# Patient Record
Sex: FEMALE | Race: OTHER | Employment: UNEMPLOYED | URBAN - METROPOLITAN AREA
[De-identification: names, ages, dates, MRNs, and addresses within clinical notes are randomized per-mention and may not be internally consistent; named-entity substitution may affect disease eponyms.]

---

## 2017-01-28 ENCOUNTER — APPOINTMENT (OUTPATIENT)
Dept: CT IMAGING | Facility: HOSPITAL | Age: 57
End: 2017-01-28
Attending: EMERGENCY MEDICINE
Payer: COMMERCIAL

## 2017-01-28 ENCOUNTER — APPOINTMENT (OUTPATIENT)
Dept: GENERAL RADIOLOGY | Facility: HOSPITAL | Age: 57
End: 2017-01-28
Attending: EMERGENCY MEDICINE
Payer: COMMERCIAL

## 2017-01-28 ENCOUNTER — HOSPITAL ENCOUNTER (EMERGENCY)
Facility: HOSPITAL | Age: 57
Discharge: HOME | End: 2017-01-28
Attending: EMERGENCY MEDICINE
Payer: COMMERCIAL

## 2017-01-28 VITALS
RESPIRATION RATE: 17 BRPM | WEIGHT: 118 LBS | OXYGEN SATURATION: 98 % | TEMPERATURE: 98 F | DIASTOLIC BLOOD PRESSURE: 80 MMHG | BODY MASS INDEX: 22.28 KG/M2 | HEIGHT: 61 IN | SYSTOLIC BLOOD PRESSURE: 139 MMHG | HEART RATE: 71 BPM

## 2017-01-28 DIAGNOSIS — G43.009 ATYPICAL MIGRAINE: Primary | ICD-10-CM

## 2017-01-28 LAB
ALBUMIN SERPL-MCNC: 3.7 G/DL (ref 3.5–4.8)
ALP LIVER SERPL-CCNC: 109 U/L (ref 41–108)
ALT SERPL-CCNC: 31 U/L (ref 14–54)
APTT PPP: 28.5 SECONDS (ref 25–34)
AST SERPL-CCNC: 23 U/L (ref 15–41)
BASOPHILS # BLD AUTO: 0.04 X10(3) UL (ref 0–0.1)
BASOPHILS NFR BLD AUTO: 0.5 %
BILIRUB SERPL-MCNC: 0.4 MG/DL (ref 0.1–2)
BUN BLD-MCNC: 18 MG/DL (ref 8–20)
CALCIUM BLD-MCNC: 8.1 MG/DL (ref 8.3–10.3)
CHLORIDE: 105 MMOL/L (ref 101–111)
CO2: 30 MMOL/L (ref 22–32)
CREAT BLD-MCNC: 0.71 MG/DL (ref 0.55–1.02)
D-DIMER: <0.27 UG/ML FEU (ref 0–0.49)
EOSINOPHIL # BLD AUTO: 0.16 X10(3) UL (ref 0–0.3)
EOSINOPHIL NFR BLD AUTO: 1.9 %
ERYTHROCYTE [DISTWIDTH] IN BLOOD BY AUTOMATED COUNT: 13.2 % (ref 11.5–16)
GLUCOSE BLD-MCNC: 103 MG/DL (ref 70–99)
HCT VFR BLD AUTO: 41.7 % (ref 34–50)
HGB BLD-MCNC: 13.7 G/DL (ref 12–16)
IMMATURE GRANULOCYTE COUNT: 0.02 X10(3) UL (ref 0–1)
IMMATURE GRANULOCYTE RATIO %: 0.2 %
INR BLD: 0.93 (ref 0.89–1.12)
LYMPHOCYTES # BLD AUTO: 2.46 X10(3) UL (ref 0.9–4)
LYMPHOCYTES NFR BLD AUTO: 29.3 %
M PROTEIN MFR SERPL ELPH: 7.5 G/DL (ref 6.1–8.3)
MCH RBC QN AUTO: 29.3 PG (ref 27–33.2)
MCHC RBC AUTO-ENTMCNC: 32.9 G/DL (ref 31–37)
MCV RBC AUTO: 89.3 FL (ref 81–100)
MONOCYTES # BLD AUTO: 0.49 X10(3) UL (ref 0.1–0.6)
MONOCYTES NFR BLD AUTO: 5.8 %
NEUTROPHIL ABS PRELIM: 5.24 X10 (3) UL (ref 1.3–6.7)
NEUTROPHILS # BLD AUTO: 5.24 X10(3) UL (ref 1.3–6.7)
NEUTROPHILS NFR BLD AUTO: 62.3 %
PLATELET # BLD AUTO: 241 10(3)UL (ref 150–450)
POTASSIUM SERPL-SCNC: 3.6 MMOL/L (ref 3.6–5.1)
PSA SERPL DL<=0.01 NG/ML-MCNC: 12.7 SECONDS (ref 12.3–14.8)
RBC # BLD AUTO: 4.67 X10(6)UL (ref 3.8–5.1)
RED CELL DISTRIBUTION WIDTH-SD: 42.7 FL (ref 35.1–46.3)
SED RATE-ML: 8 MM/HR (ref 0–25)
SODIUM SERPL-SCNC: 142 MMOL/L (ref 136–144)
TROPONIN: <0.046 NG/ML (ref ?–0.05)
WBC # BLD AUTO: 8.4 X10(3) UL (ref 4–13)

## 2017-01-28 PROCEDURE — 80053 COMPREHEN METABOLIC PANEL: CPT | Performed by: EMERGENCY MEDICINE

## 2017-01-28 PROCEDURE — 99284 EMERGENCY DEPT VISIT MOD MDM: CPT

## 2017-01-28 PROCEDURE — 71010 XR CHEST AP PORTABLE  (CPT=71010): CPT

## 2017-01-28 PROCEDURE — 96374 THER/PROPH/DIAG INJ IV PUSH: CPT

## 2017-01-28 PROCEDURE — 85730 THROMBOPLASTIN TIME PARTIAL: CPT | Performed by: EMERGENCY MEDICINE

## 2017-01-28 PROCEDURE — 85378 FIBRIN DEGRADE SEMIQUANT: CPT | Performed by: EMERGENCY MEDICINE

## 2017-01-28 PROCEDURE — 93010 ELECTROCARDIOGRAM REPORT: CPT

## 2017-01-28 PROCEDURE — 96375 TX/PRO/DX INJ NEW DRUG ADDON: CPT

## 2017-01-28 PROCEDURE — 84484 ASSAY OF TROPONIN QUANT: CPT | Performed by: EMERGENCY MEDICINE

## 2017-01-28 PROCEDURE — 85025 COMPLETE CBC W/AUTO DIFF WBC: CPT | Performed by: EMERGENCY MEDICINE

## 2017-01-28 PROCEDURE — 85610 PROTHROMBIN TIME: CPT | Performed by: EMERGENCY MEDICINE

## 2017-01-28 PROCEDURE — 85652 RBC SED RATE AUTOMATED: CPT | Performed by: EMERGENCY MEDICINE

## 2017-01-28 PROCEDURE — 93005 ELECTROCARDIOGRAM TRACING: CPT

## 2017-01-28 PROCEDURE — 70498 CT ANGIOGRAPHY NECK: CPT

## 2017-01-28 PROCEDURE — 70496 CT ANGIOGRAPHY HEAD: CPT

## 2017-01-28 PROCEDURE — 99285 EMERGENCY DEPT VISIT HI MDM: CPT

## 2017-01-28 RX ORDER — KETOROLAC TROMETHAMINE 30 MG/ML
30 INJECTION, SOLUTION INTRAMUSCULAR; INTRAVENOUS ONCE
Status: COMPLETED | OUTPATIENT
Start: 2017-01-28 | End: 2017-01-28

## 2017-01-28 RX ORDER — ONDANSETRON 2 MG/ML
4 INJECTION INTRAMUSCULAR; INTRAVENOUS ONCE
Status: COMPLETED | OUTPATIENT
Start: 2017-01-28 | End: 2017-01-28

## 2017-01-28 RX ORDER — HYDROMORPHONE HYDROCHLORIDE 1 MG/ML
0.5 INJECTION, SOLUTION INTRAMUSCULAR; INTRAVENOUS; SUBCUTANEOUS EVERY 30 MIN PRN
Status: DISCONTINUED | OUTPATIENT
Start: 2017-01-28 | End: 2017-01-28

## 2017-01-28 RX ORDER — BUTALBITAL, ACETAMINOPHEN AND CAFFEINE 50; 325; 40 MG/1; MG/1; MG/1
1-2 TABLET ORAL EVERY 4 HOURS PRN
Qty: 20 TABLET | Refills: 0 | Status: SHIPPED | OUTPATIENT
Start: 2017-01-28 | End: 2017-02-04

## 2017-01-28 NOTE — ED INITIAL ASSESSMENT (HPI)
C/o pain to lt shoulder and arm onset yesterday, worse today,   Now also has numbness to lt side of face, tongue and lt arm

## 2017-01-28 NOTE — ED PROVIDER NOTES
Patient Seen in: [facility] Emergency Department    History   Patient presents with:  Back Pain (musculoskeletal)  Numbness Weakness (neurologic)    Stated Complaint: back pain/numbness to face    HPI    Patient presents with left neck and shoulder Vaginal Cream,  APPLY AS DIRECTED DAILY VAGINALLY FOR 30 DAYS   Nortriptyline HCl 10 MG Oral Cap,  For 1 week, take one capsule nightly, afterwards increase to 2 capsule nightly   Dicyclomine HCl 10 MG Oral Cap,  Take 10 mg by mouth 3 (three) times daily. Temp(Src) 97.6 °F (36.4 °C) (Temporal)  Resp 17  Ht 154.9 cm (5' 1\")  Wt 53.524 kg  BMI 22.31 kg/m2  SpO2 98%        Physical Exam    General: Alert and oriented x3 in no acute distress.   HEENT: Normocephalic, atraumatic, pupils equal round and reactive t DIFFERENTIAL[[id number]]                              Final result                 Please view results for these tests on the individual orders.    RAINBOW DRAW BLUE   RAINBOW DRAW GOLD   RAINBOW DRAW LAVENDER   RAINBOW DRAW LIGHT GREEN   CBC W/ DIFFERENTIAL arteries. All measurements obtained in this exam were performed using NASCET criteria. Dose reduction techniques were used.  Dose information is transmitted to the ACR ([facility]) Amilcar Harrell [address] ([address]) which includes t subclavian arteries. The origins of the vertebral arteries are patent. The cervical vertebral arteries are widely patent. The left vertebral artery is dominant. Degenerative changes in the spine. Scattered atelectasis in the lung apices.        1/28/201 days        Medications Prescribed:  Discharge Medication List as of 1/28/2017  7:22 PM    START taking these medications    Butalbital-APAP-Caffeine -40 MG Oral Tab  Take 1-2 tablets by mouth every 4 (four) hours as needed for Pain., Script printed,

## 2017-01-29 LAB
ATRIAL RATE: 73 BPM
P AXIS: 51 DEGREES
P-R INTERVAL: 156 MS
Q-T INTERVAL: 426 MS
QRS DURATION: 80 MS
QTC CALCULATION (BEZET): 469 MS
R AXIS: 55 DEGREES
T AXIS: 45 DEGREES
VENTRICULAR RATE: 73 BPM

## 2017-02-04 ENCOUNTER — LAB ENCOUNTER (OUTPATIENT)
Dept: LAB | Age: 57
End: 2017-02-04
Attending: OBSTETRICS & GYNECOLOGY
Payer: COMMERCIAL

## 2017-02-04 DIAGNOSIS — N76.0 ACUTE VAGINITIS: ICD-10-CM

## 2017-02-04 DIAGNOSIS — Z01.419 PAP SMEAR, AS PART OF ROUTINE GYNECOLOGICAL EXAMINATION: ICD-10-CM

## 2017-02-04 DIAGNOSIS — Z12.31 VISIT FOR SCREENING MAMMOGRAM: Primary | ICD-10-CM

## 2017-02-04 PROCEDURE — 88175 CYTOPATH C/V AUTO FLUID REDO: CPT

## 2017-02-04 PROCEDURE — 87624 HPV HI-RISK TYP POOLED RSLT: CPT

## 2017-02-04 PROCEDURE — 87510 GARDNER VAG DNA DIR PROBE: CPT

## 2017-02-04 PROCEDURE — 87480 CANDIDA DNA DIR PROBE: CPT

## 2017-02-04 PROCEDURE — 87660 TRICHOMONAS VAGIN DIR PROBE: CPT

## 2017-02-08 LAB — HPV I/H RISK 1 DNA SPEC QL NAA+PROBE: NEGATIVE

## 2017-02-11 ENCOUNTER — HOSPITAL ENCOUNTER (EMERGENCY)
Facility: HOSPITAL | Age: 57
Discharge: HOME | End: 2017-02-11
Attending: STUDENT IN AN ORGANIZED HEALTH CARE EDUCATION/TRAINING PROGRAM
Payer: COMMERCIAL

## 2017-02-11 ENCOUNTER — APPOINTMENT (OUTPATIENT)
Dept: GENERAL RADIOLOGY | Facility: HOSPITAL | Age: 57
End: 2017-02-11
Attending: STUDENT IN AN ORGANIZED HEALTH CARE EDUCATION/TRAINING PROGRAM
Payer: COMMERCIAL

## 2017-02-11 VITALS
WEIGHT: 121 LBS | HEART RATE: 60 BPM | RESPIRATION RATE: 14 BRPM | SYSTOLIC BLOOD PRESSURE: 125 MMHG | DIASTOLIC BLOOD PRESSURE: 90 MMHG | HEIGHT: 61 IN | BODY MASS INDEX: 22.84 KG/M2 | OXYGEN SATURATION: 96 %

## 2017-02-11 DIAGNOSIS — R07.9 ACUTE CHEST PAIN: ICD-10-CM

## 2017-02-11 DIAGNOSIS — R10.13 ABDOMINAL PAIN, EPIGASTRIC: Primary | ICD-10-CM

## 2017-02-11 DIAGNOSIS — K21.9 GASTROESOPHAGEAL REFLUX DISEASE, ESOPHAGITIS PRESENCE NOT SPECIFIED: ICD-10-CM

## 2017-02-11 LAB
ALBUMIN SERPL-MCNC: 3.5 G/DL (ref 3.5–4.8)
ALP LIVER SERPL-CCNC: 121 U/L (ref 41–108)
ALT SERPL-CCNC: 47 U/L (ref 14–54)
AST SERPL-CCNC: 70 U/L (ref 15–41)
ATRIAL RATE: 73 BPM
BASOPHILS # BLD AUTO: 0.07 X10(3) UL (ref 0–0.1)
BASOPHILS NFR BLD AUTO: 0.7 %
BILIRUB SERPL-MCNC: 0.4 MG/DL (ref 0.1–2)
BILIRUB UR QL STRIP.AUTO: NEGATIVE
BUN BLD-MCNC: 17 MG/DL (ref 8–20)
CALCIUM BLD-MCNC: 8.9 MG/DL (ref 8.3–10.3)
CHLORIDE: 101 MMOL/L (ref 101–111)
CO2: 32 MMOL/L (ref 22–32)
COLOR UR AUTO: YELLOW
CREAT BLD-MCNC: 0.68 MG/DL (ref 0.55–1.02)
EOSINOPHIL # BLD AUTO: 0.19 X10(3) UL (ref 0–0.3)
EOSINOPHIL NFR BLD AUTO: 1.8 %
ERYTHROCYTE [DISTWIDTH] IN BLOOD BY AUTOMATED COUNT: 13.2 % (ref 11.5–16)
GLUCOSE BLD-MCNC: 109 MG/DL (ref 70–99)
GLUCOSE UR STRIP.AUTO-MCNC: NEGATIVE MG/DL
HCT VFR BLD AUTO: 42.9 % (ref 34–50)
HGB BLD-MCNC: 14.5 G/DL (ref 12–16)
IMMATURE GRANULOCYTE COUNT: 0.04 X10(3) UL (ref 0–1)
IMMATURE GRANULOCYTE RATIO %: 0.4 %
KETONES UR STRIP.AUTO-MCNC: NEGATIVE MG/DL
LIPASE: 427 U/L (ref 73–393)
LYMPHOCYTES # BLD AUTO: 4.93 X10(3) UL (ref 0.9–4)
LYMPHOCYTES NFR BLD AUTO: 46.3 %
M PROTEIN MFR SERPL ELPH: 7.5 G/DL (ref 6.1–8.3)
MCH RBC QN AUTO: 29.2 PG (ref 27–33.2)
MCHC RBC AUTO-ENTMCNC: 33.8 G/DL (ref 31–37)
MCV RBC AUTO: 86.5 FL (ref 81–100)
MONOCYTES # BLD AUTO: 0.64 X10(3) UL (ref 0.1–0.6)
MONOCYTES NFR BLD AUTO: 6 %
NEUTROPHIL ABS PRELIM: 4.78 X10 (3) UL (ref 1.3–6.7)
NEUTROPHILS # BLD AUTO: 4.78 X10(3) UL (ref 1.3–6.7)
NEUTROPHILS NFR BLD AUTO: 44.8 %
NITRITE UR QL STRIP.AUTO: NEGATIVE
P AXIS: 77 DEGREES
P-R INTERVAL: 160 MS
PH UR STRIP.AUTO: 8 [PH] (ref 4.5–8)
PLATELET # BLD AUTO: 269 10(3)UL (ref 150–450)
POTASSIUM SERPL-SCNC: 3.5 MMOL/L (ref 3.6–5.1)
PROT UR STRIP.AUTO-MCNC: NEGATIVE MG/DL
Q-T INTERVAL: 404 MS
QRS DURATION: 80 MS
QTC CALCULATION (BEZET): 445 MS
R AXIS: 53 DEGREES
RBC # BLD AUTO: 4.96 X10(6)UL (ref 3.8–5.1)
RBC UR QL AUTO: NEGATIVE
RED CELL DISTRIBUTION WIDTH-SD: 40.9 FL (ref 35.1–46.3)
SODIUM SERPL-SCNC: 140 MMOL/L (ref 136–144)
SP GR UR STRIP.AUTO: 1.01 (ref 1–1.03)
T AXIS: 45 DEGREES
TROPONIN: <0.046 NG/ML (ref ?–0.05)
UROBILINOGEN UR STRIP.AUTO-MCNC: <2 MG/DL
VENTRICULAR RATE: 73 BPM
WBC # BLD AUTO: 10.7 X10(3) UL (ref 4–13)

## 2017-02-11 PROCEDURE — 99285 EMERGENCY DEPT VISIT HI MDM: CPT

## 2017-02-11 PROCEDURE — 80053 COMPREHEN METABOLIC PANEL: CPT | Performed by: STUDENT IN AN ORGANIZED HEALTH CARE EDUCATION/TRAINING PROGRAM

## 2017-02-11 PROCEDURE — 96374 THER/PROPH/DIAG INJ IV PUSH: CPT

## 2017-02-11 PROCEDURE — 83690 ASSAY OF LIPASE: CPT | Performed by: STUDENT IN AN ORGANIZED HEALTH CARE EDUCATION/TRAINING PROGRAM

## 2017-02-11 PROCEDURE — 87086 URINE CULTURE/COLONY COUNT: CPT | Performed by: STUDENT IN AN ORGANIZED HEALTH CARE EDUCATION/TRAINING PROGRAM

## 2017-02-11 PROCEDURE — 85025 COMPLETE CBC W/AUTO DIFF WBC: CPT | Performed by: STUDENT IN AN ORGANIZED HEALTH CARE EDUCATION/TRAINING PROGRAM

## 2017-02-11 PROCEDURE — 81001 URINALYSIS AUTO W/SCOPE: CPT | Performed by: STUDENT IN AN ORGANIZED HEALTH CARE EDUCATION/TRAINING PROGRAM

## 2017-02-11 PROCEDURE — 84484 ASSAY OF TROPONIN QUANT: CPT | Performed by: STUDENT IN AN ORGANIZED HEALTH CARE EDUCATION/TRAINING PROGRAM

## 2017-02-11 PROCEDURE — 71010 XR CHEST AP PORTABLE  (CPT=71010): CPT

## 2017-02-11 PROCEDURE — 87088 URINE BACTERIA CULTURE: CPT | Performed by: STUDENT IN AN ORGANIZED HEALTH CARE EDUCATION/TRAINING PROGRAM

## 2017-02-11 PROCEDURE — 87186 SC STD MICRODIL/AGAR DIL: CPT | Performed by: STUDENT IN AN ORGANIZED HEALTH CARE EDUCATION/TRAINING PROGRAM

## 2017-02-11 PROCEDURE — 96361 HYDRATE IV INFUSION ADD-ON: CPT

## 2017-02-11 PROCEDURE — 93010 ELECTROCARDIOGRAM REPORT: CPT

## 2017-02-11 PROCEDURE — 93005 ELECTROCARDIOGRAM TRACING: CPT

## 2017-02-11 RX ORDER — CODEINE/BUTALBITAL/ASA/CAFFEIN 30-50-325
1 CAPSULE ORAL EVERY 4 HOURS PRN
COMMUNITY
End: 2017-11-09 | Stop reason: ALTCHOICE

## 2017-02-11 RX ORDER — MAGNESIUM HYDROXIDE/ALUMINUM HYDROXICE/SIMETHICONE 120; 1200; 1200 MG/30ML; MG/30ML; MG/30ML
30 SUSPENSION ORAL ONCE
Status: COMPLETED | OUTPATIENT
Start: 2017-02-11 | End: 2017-02-11

## 2017-02-11 RX ORDER — SUMATRIPTAN 50 MG/1
50 TABLET, FILM COATED ORAL EVERY 2 HOUR PRN
Qty: 6 TABLET | Refills: 0 | Status: SHIPPED | OUTPATIENT
Start: 2017-02-11 | End: 2017-03-13

## 2017-02-11 RX ORDER — FAMOTIDINE 20 MG/1
20 TABLET ORAL 2 TIMES DAILY PRN
Qty: 30 TABLET | Refills: 0 | Status: SHIPPED | OUTPATIENT
Start: 2017-02-11 | End: 2017-03-13

## 2017-02-11 RX ORDER — LORAZEPAM 2 MG/ML
0.5 INJECTION INTRAMUSCULAR ONCE
Status: COMPLETED | OUTPATIENT
Start: 2017-02-11 | End: 2017-02-11

## 2017-02-11 NOTE — ED PROVIDER NOTES
Patient Seen in: [facility] Emergency Department    History   Patient presents with:  Abdomen/Flank Pain (GI/)    Stated Complaint: abd pain     HPI    Patient is a 52-year-old female who presents emergency department complaining of epigastric abd DAILY VAGINALLY FOR 30 DAYS   Dicyclomine HCl 10 MG Oral Cap,  Take 10 mg by mouth 3 (three) times daily. lansoprazole (PREVACID) 30 MG Oral Capsule Delayed Release,  Take 30 mg by mouth 2 (two) times daily.    Rosuvastatin Calcium (CRESTOR) 20 MG Oral Ta Normal range of motion. Neck supple. Cardiovascular: Normal rate, regular rhythm, normal heart sounds and intact distal pulses. Exam reveals no gallop and no friction rub. No murmur heard. Pulmonary/Chest: Effort normal. No respiratory distress.   no RAINBOW DRAW LAVENDER   RAINBOW DRAW LIGHT GREEN   URINE CULTURE, ROUTINE      EKG    Rate, intervals and axes as noted on EKG Report.   Rate: 73  Rhythm: Sinus Rhythm  Reading: Sinus rhythm with occasional PACs, normal intervals, normal axis, no ST eleva hours.  Qty: 6 tablet Refills: 0    famoTIDine (PEPCID) 20 MG Oral Tab  Take 1 tablet (20 mg total) by mouth 2 (two) times daily as needed for Heartburn.   Qty: 30 tablet Refills: 0

## 2017-02-11 NOTE — ED INITIAL ASSESSMENT (HPI)
56YF c/c of abd pain Pt state she took her migraine medication with out food and then started having upper abd pain after min after

## 2017-02-11 NOTE — ED NOTES
Discharge instructions were reviewed and pt verbalized understanding. Pt states she feels better at this time. Pt is drowsy but is able to follow commands. Pt is transported out of the ED via wheelchair and and is going home with son.

## 2017-02-13 RX ORDER — SULFAMETHOXAZOLE AND TRIMETHOPRIM 800; 160 MG/1; MG/1
1 TABLET ORAL 2 TIMES DAILY
Qty: 6 TABLET | Refills: 0 | Status: SHIPPED | OUTPATIENT
Start: 2017-02-13 | End: 2017-02-16

## 2017-02-15 RX ORDER — METRONIDAZOLE 7.5 MG/G
1 GEL VAGINAL NIGHTLY
Qty: 1 TUBE | Refills: 0 | Status: SHIPPED | OUTPATIENT
Start: 2017-02-15 | End: 2017-02-20

## 2017-02-21 DIAGNOSIS — R10.2 FEMALE PELVIC PAIN: Primary | ICD-10-CM

## 2017-02-27 ENCOUNTER — NURSE ONLY (OUTPATIENT)
Dept: OBGYN CLINIC | Facility: CLINIC | Age: 57
End: 2017-02-27

## 2017-03-03 DIAGNOSIS — R10.2 PELVIC PAIN IN FEMALE: Primary | ICD-10-CM

## 2017-03-09 ENCOUNTER — TELEPHONE (OUTPATIENT)
Dept: OBGYN CLINIC | Facility: CLINIC | Age: 57
End: 2017-03-09

## 2017-03-09 NOTE — TELEPHONE ENCOUNTER
Patient was given sample of Metronitafol 0.75%, still having vaginal itching and irritation.  Will check with Dr. Yoel Christianson and inform patient

## 2017-06-12 ENCOUNTER — HOSPITAL ENCOUNTER (EMERGENCY)
Facility: HOSPITAL | Age: 57
Discharge: HOME | End: 2017-06-12
Attending: EMERGENCY MEDICINE
Payer: COMMERCIAL

## 2017-06-12 ENCOUNTER — APPOINTMENT (OUTPATIENT)
Dept: GENERAL RADIOLOGY | Facility: HOSPITAL | Age: 57
End: 2017-06-12
Attending: EMERGENCY MEDICINE
Payer: COMMERCIAL

## 2017-06-12 ENCOUNTER — APPOINTMENT (OUTPATIENT)
Dept: CT IMAGING | Facility: HOSPITAL | Age: 57
End: 2017-06-12
Attending: EMERGENCY MEDICINE
Payer: COMMERCIAL

## 2017-06-12 VITALS
DIASTOLIC BLOOD PRESSURE: 82 MMHG | TEMPERATURE: 98 F | HEIGHT: 62 IN | WEIGHT: 115 LBS | HEART RATE: 72 BPM | OXYGEN SATURATION: 98 % | SYSTOLIC BLOOD PRESSURE: 140 MMHG | BODY MASS INDEX: 21.16 KG/M2 | RESPIRATION RATE: 16 BRPM

## 2017-06-12 DIAGNOSIS — R20.2 PARESTHESIAS: Primary | ICD-10-CM

## 2017-06-12 PROCEDURE — 85025 COMPLETE CBC W/AUTO DIFF WBC: CPT | Performed by: EMERGENCY MEDICINE

## 2017-06-12 PROCEDURE — 70450 CT HEAD/BRAIN W/O DYE: CPT | Performed by: EMERGENCY MEDICINE

## 2017-06-12 PROCEDURE — 93010 ELECTROCARDIOGRAM REPORT: CPT

## 2017-06-12 PROCEDURE — 85610 PROTHROMBIN TIME: CPT | Performed by: EMERGENCY MEDICINE

## 2017-06-12 PROCEDURE — 93005 ELECTROCARDIOGRAM TRACING: CPT

## 2017-06-12 PROCEDURE — 81001 URINALYSIS AUTO W/SCOPE: CPT | Performed by: EMERGENCY MEDICINE

## 2017-06-12 PROCEDURE — 99285 EMERGENCY DEPT VISIT HI MDM: CPT

## 2017-06-12 PROCEDURE — 71010 XR CHEST AP PORTABLE  (CPT=71010): CPT | Performed by: EMERGENCY MEDICINE

## 2017-06-12 PROCEDURE — 80053 COMPREHEN METABOLIC PANEL: CPT | Performed by: EMERGENCY MEDICINE

## 2017-06-12 PROCEDURE — 36415 COLL VENOUS BLD VENIPUNCTURE: CPT

## 2017-06-12 PROCEDURE — 85730 THROMBOPLASTIN TIME PARTIAL: CPT | Performed by: EMERGENCY MEDICINE

## 2017-06-12 NOTE — ED PROVIDER NOTES
Patient Seen in: [facility] Emergency Department    History   Patient presents with:  Numbness Weakness (neurologic)    Stated Complaint: TINGLING TO ARMS AND LEGS    HPI    Patient presents with numbness.   She went to get up from the couch at about mg by mouth nightly. AmLODIPine Besylate (NORVASC) 5 MG Oral Tab,  Take 5 mg by mouth daily.        Family History   Problem Relation Age of Onset   • Heart Disorder Father    • Heart Disorder Mother    • Hypertension Sister    • Lipids Sister    • Other[ tenderness. Extremities: No CCE. Skin: Warm and dry. Neurologic: Cranial nerves intact. Strength 5/5 in all extremities. Sensory exam grossly intact.     ED Course     Labs Reviewed   COMP METABOLIC PANEL (14) - Abnormal; Notable for the following: Dose Index Registry. PATIENT STATED HISTORY: (As transcribed by Technologist)  Patient had numbness, tingling to both arms and legs, also decresed sensation to left face with slight stiffness to left mouth. history of magraines, stroke.     FINDINGS:   [facility] neurology. She felt comfortable with the patient going home and resuming her aspirin therapy. She will follow-up with Dr. Batsheva Melara, her normal neurologist.  She was counseled to return for any recurring or worsening symptoms.     Disposition and Plan

## 2017-06-12 NOTE — ED INITIAL ASSESSMENT (HPI)
Pt reports talking on the phone when she \"felt funny\" with numbness and tingling to both arms and legs. Decreased sensation to left face with slight stiffness to left mouth.

## 2017-06-12 NOTE — ED NOTES
MD aware of NIH score.   Discussed HPI with MD. Martín Covington to page code yellow - called at [phone]

## 2017-06-19 ENCOUNTER — TELEPHONE (OUTPATIENT)
Dept: NEUROLOGY | Facility: CLINIC | Age: 57
End: 2017-06-19

## 2017-08-03 ENCOUNTER — OFFICE VISIT (OUTPATIENT)
Dept: NEUROLOGY | Facility: CLINIC | Age: 57
End: 2017-08-03

## 2017-08-03 VITALS
SYSTOLIC BLOOD PRESSURE: 100 MMHG | DIASTOLIC BLOOD PRESSURE: 60 MMHG | BODY MASS INDEX: 22 KG/M2 | WEIGHT: 120 LBS | RESPIRATION RATE: 18 BRPM | HEART RATE: 72 BPM

## 2017-08-03 DIAGNOSIS — G43.009 MIGRAINE WITHOUT AURA AND WITHOUT STATUS MIGRAINOSUS, NOT INTRACTABLE: Primary | ICD-10-CM

## 2017-08-03 PROCEDURE — 99213 OFFICE O/P EST LOW 20 MIN: CPT | Performed by: OTHER

## 2017-08-03 NOTE — PATIENT INSTRUCTIONS
Refill policies:    • Allow 2-3 business days for refills; controlled substances may take longer.   • Contact your pharmacy at least 5 days prior to running out of medication and have them send an electronic request or submit request through the [facility] have a procedure or additional testing performed. [facility]) will contact your insurance carrier to obtain pre-certification or prior authorization.     Unfortunately, [facility] has seen an increase in denial of payment even though the p

## 2017-08-03 NOTE — PROGRESS NOTES
Neurology H&P    Benjamen Stagers Patient Status:  No patient class for patient encounter    1960 MRN [id number]   Location [address] [place] [address], [address] [place] [address] Drive, [facility] Attending No att. providers found   Hosp Day # 0 PCP Cyn Smith MD Disp:  Rfl:    lansoprazole (PREVACID) 30 MG Oral Capsule Delayed Release Take 30 mg by mouth 2 (two) times daily. Disp:  Rfl:    Rosuvastatin Calcium (CRESTOR) 20 MG Oral Tab Take 20 mg by mouth nightly.  Disp:  Rfl:    AmLODIPine Besylate (NORVASC) 5 MG O discharge  Pulmonary: no SOB, no new cough  CV: no chest pain, no new lower extremity swelling  GI: no constipation or abdominal pain  : denies frequent urination or difficulty urinating   Heme: no new bruising, no unexplained fevers or chills  Endocrine when all this started) is more likely explanation given only sensory symptoms and distribution over BUE and BLE and face. She can continue to take daily ASA 81mg for now. Follow up in 3 months for reevaluation      Plan:  1. BUE BLE and face paresthesias  -

## 2017-08-27 ENCOUNTER — HOSPITAL ENCOUNTER (OUTPATIENT)
Facility: HOSPITAL | Age: 57
Setting detail: OBSERVATION
Discharge: HOME | End: 2017-08-28
Attending: EMERGENCY MEDICINE | Admitting: INTERNAL MEDICINE
Payer: COMMERCIAL

## 2017-08-27 ENCOUNTER — APPOINTMENT (OUTPATIENT)
Dept: GENERAL RADIOLOGY | Facility: HOSPITAL | Age: 57
End: 2017-08-27
Attending: EMERGENCY MEDICINE
Payer: COMMERCIAL

## 2017-08-27 DIAGNOSIS — R07.9 ACUTE CHEST PAIN: Primary | ICD-10-CM

## 2017-08-27 LAB
ALBUMIN SERPL-MCNC: 3.5 G/DL (ref 3.5–4.8)
ALP LIVER SERPL-CCNC: 123 U/L (ref 46–118)
ALT SERPL-CCNC: 46 U/L (ref 14–54)
APTT PPP: 28.1 SECONDS (ref 25–34)
AST SERPL-CCNC: 28 U/L (ref 15–41)
ATRIAL RATE: 70 BPM
BASOPHILS # BLD AUTO: 0.05 X10(3) UL (ref 0–0.1)
BASOPHILS NFR BLD AUTO: 0.6 %
BILIRUB SERPL-MCNC: 0.4 MG/DL (ref 0.1–2)
BUN BLD-MCNC: 19 MG/DL (ref 8–20)
CALCIUM BLD-MCNC: 8.5 MG/DL (ref 8.3–10.3)
CHLORIDE: 105 MMOL/L (ref 101–111)
CO2: 31 MMOL/L (ref 22–32)
CREAT BLD-MCNC: 1.11 MG/DL (ref 0.55–1.02)
EOSINOPHIL # BLD AUTO: 0.11 X10(3) UL (ref 0–0.3)
EOSINOPHIL NFR BLD AUTO: 1.4 %
ERYTHROCYTE [DISTWIDTH] IN BLOOD BY AUTOMATED COUNT: 13.4 % (ref 11.5–16)
GLUCOSE BLD-MCNC: 85 MG/DL (ref 70–99)
HCT VFR BLD AUTO: 39.3 % (ref 34–50)
HGB BLD-MCNC: 12.9 G/DL (ref 12–16)
IMMATURE GRANULOCYTE COUNT: 0.03 X10(3) UL (ref 0–1)
IMMATURE GRANULOCYTE RATIO %: 0.4 %
INR BLD: 0.98 (ref 0.89–1.11)
LYMPHOCYTES # BLD AUTO: 2.77 X10(3) UL (ref 0.9–4)
LYMPHOCYTES NFR BLD AUTO: 34.4 %
M PROTEIN MFR SERPL ELPH: 7.2 G/DL (ref 6.1–8.3)
MCH RBC QN AUTO: 28.5 PG (ref 27–33.2)
MCHC RBC AUTO-ENTMCNC: 32.8 G/DL (ref 31–37)
MCV RBC AUTO: 86.8 FL (ref 81–100)
MONOCYTES # BLD AUTO: 0.51 X10(3) UL (ref 0.1–0.6)
MONOCYTES NFR BLD AUTO: 6.3 %
NEUTROPHIL ABS PRELIM: 4.58 X10 (3) UL (ref 1.3–6.7)
NEUTROPHILS # BLD AUTO: 4.58 X10(3) UL (ref 1.3–6.7)
NEUTROPHILS NFR BLD AUTO: 56.9 %
P AXIS: 68 DEGREES
P-R INTERVAL: 160 MS
PLATELET # BLD AUTO: 250 10(3)UL (ref 150–450)
POTASSIUM SERPL-SCNC: 3.9 MMOL/L (ref 3.6–5.1)
PSA SERPL DL<=0.01 NG/ML-MCNC: 13 SECONDS (ref 12–14.3)
Q-T INTERVAL: 404 MS
QRS DURATION: 78 MS
QTC CALCULATION (BEZET): 436 MS
R AXIS: 62 DEGREES
RBC # BLD AUTO: 4.53 X10(6)UL (ref 3.8–5.1)
RED CELL DISTRIBUTION WIDTH-SD: 42 FL (ref 35.1–46.3)
SODIUM SERPL-SCNC: 142 MMOL/L (ref 136–144)
T AXIS: 52 DEGREES
TROPONIN: <0.046 NG/ML (ref ?–0.05)
TROPONIN: <0.046 NG/ML (ref ?–0.05)
VENTRICULAR RATE: 70 BPM
WBC # BLD AUTO: 8.1 X10(3) UL (ref 4–13)

## 2017-08-27 PROCEDURE — 71010 XR CHEST AP PORTABLE  (CPT=71010): CPT | Performed by: EMERGENCY MEDICINE

## 2017-08-27 PROCEDURE — 93005 ELECTROCARDIOGRAM TRACING: CPT

## 2017-08-27 PROCEDURE — 85025 COMPLETE CBC W/AUTO DIFF WBC: CPT | Performed by: EMERGENCY MEDICINE

## 2017-08-27 PROCEDURE — 93010 ELECTROCARDIOGRAM REPORT: CPT | Performed by: INTERNAL MEDICINE

## 2017-08-27 PROCEDURE — 99285 EMERGENCY DEPT VISIT HI MDM: CPT

## 2017-08-27 PROCEDURE — 80053 COMPREHEN METABOLIC PANEL: CPT | Performed by: EMERGENCY MEDICINE

## 2017-08-27 PROCEDURE — 93010 ELECTROCARDIOGRAM REPORT: CPT

## 2017-08-27 PROCEDURE — 36415 COLL VENOUS BLD VENIPUNCTURE: CPT

## 2017-08-27 PROCEDURE — 85610 PROTHROMBIN TIME: CPT | Performed by: EMERGENCY MEDICINE

## 2017-08-27 PROCEDURE — 84484 ASSAY OF TROPONIN QUANT: CPT | Performed by: INTERNAL MEDICINE

## 2017-08-27 PROCEDURE — 85730 THROMBOPLASTIN TIME PARTIAL: CPT | Performed by: EMERGENCY MEDICINE

## 2017-08-27 PROCEDURE — 84484 ASSAY OF TROPONIN QUANT: CPT | Performed by: EMERGENCY MEDICINE

## 2017-08-27 RX ORDER — PANTOPRAZOLE SODIUM 40 MG/1
40 TABLET, DELAYED RELEASE ORAL
Status: DISCONTINUED | OUTPATIENT
Start: 2017-08-28 | End: 2017-08-28

## 2017-08-27 RX ORDER — ENOXAPARIN SODIUM 100 MG/ML
40 INJECTION SUBCUTANEOUS NIGHTLY
Status: DISCONTINUED | OUTPATIENT
Start: 2017-08-27 | End: 2017-08-28

## 2017-08-27 RX ORDER — ALPRAZOLAM 0.25 MG/1
0.25 TABLET ORAL EVERY 4 HOURS PRN
Status: DISCONTINUED | OUTPATIENT
Start: 2017-08-27 | End: 2017-08-28

## 2017-08-27 RX ORDER — ONDANSETRON 2 MG/ML
4 INJECTION INTRAMUSCULAR; INTRAVENOUS EVERY 4 HOURS PRN
Status: DISCONTINUED | OUTPATIENT
Start: 2017-08-27 | End: 2017-08-28

## 2017-08-27 RX ORDER — ACETAMINOPHEN 325 MG/1
650 TABLET ORAL EVERY 4 HOURS PRN
Status: DISCONTINUED | OUTPATIENT
Start: 2017-08-27 | End: 2017-08-28

## 2017-08-27 RX ORDER — ROSUVASTATIN CALCIUM 20 MG/1
20 TABLET, COATED ORAL NIGHTLY
Status: DISCONTINUED | OUTPATIENT
Start: 2017-08-27 | End: 2017-08-28

## 2017-08-27 RX ORDER — AMLODIPINE BESYLATE 5 MG/1
5 TABLET ORAL DAILY
Status: DISCONTINUED | OUTPATIENT
Start: 2017-08-27 | End: 2017-08-28

## 2017-08-27 RX ORDER — SODIUM CHLORIDE 9 MG/ML
INJECTION, SOLUTION INTRAVENOUS CONTINUOUS
Status: ACTIVE | OUTPATIENT
Start: 2017-08-27 | End: 2017-08-27

## 2017-08-27 RX ORDER — ASPIRIN 81 MG/1
324 TABLET, CHEWABLE ORAL ONCE
Status: COMPLETED | OUTPATIENT
Start: 2017-08-27 | End: 2017-08-27

## 2017-08-27 RX ORDER — ASPIRIN 81 MG/1
81 TABLET, CHEWABLE ORAL DAILY
Status: DISCONTINUED | OUTPATIENT
Start: 2017-08-27 | End: 2017-08-28

## 2017-08-27 NOTE — ED INITIAL ASSESSMENT (HPI)
Pt to ED with c/o CP that started yesterday. Sts pain radiates to left arm. Sts pain is intermittent.  +JOANNE  PT sts she stopped taking some of her cardiac medicine for 3 months because she was feeling better

## 2017-08-27 NOTE — ED PROVIDER NOTES
Patient Seen in: [facility] Emergency Department    History   Patient presents with:  Chest Pain Angina (cardiovascular)    Stated Complaint:     HPI    Patient is a 60-year-old female with medical history as noted below including hypertension and h Oral Cap,  Take 10 mg by mouth 3 (three) times daily. lansoprazole (PREVACID) 30 MG Oral Capsule Delayed Release,  Take 30 mg by mouth 2 (two) times daily. Rosuvastatin Calcium (CRESTOR) 20 MG Oral Tab,  Take 20 mg by mouth nightly.    AmLODIPine Besyla Mucous membranes are moist.  NECK: There is no focal tenderness to palpation appreciated. There is no JVD. No meningeal signs or nuchal rigidity appreciated. No stridor. LUNGS: Clear to auscultation bilaterally with no wheeze.  There is good equal air entr RAINBOW DRAW LAVENDER   RAINBOW DRAW LIGHT GREEN     EKG    Rate, intervals and axes as noted on EKG Report.   Rate: 70  Rhythm: Sinus Rhythm  Reading: No acute ischemic change noted           ============================================================ Marly Alford MD  [place] [address]  [phone]    Call in 1 day  [address]      Medications Prescribed:  Current Discharge Medication List        Present on Admission  Date Reviewed: 8/3/2017

## 2017-08-28 ENCOUNTER — APPOINTMENT (OUTPATIENT)
Dept: CV DIAGNOSTICS | Facility: HOSPITAL | Age: 57
End: 2017-08-28
Attending: INTERNAL MEDICINE
Payer: COMMERCIAL

## 2017-08-28 VITALS
OXYGEN SATURATION: 99 % | TEMPERATURE: 98 F | HEART RATE: 80 BPM | RESPIRATION RATE: 16 BRPM | SYSTOLIC BLOOD PRESSURE: 135 MMHG | DIASTOLIC BLOOD PRESSURE: 79 MMHG | HEIGHT: 62 IN | WEIGHT: 117.69 LBS | BODY MASS INDEX: 21.66 KG/M2

## 2017-08-28 LAB
ATRIAL RATE: 57 BPM
ATRIAL RATE: 66 BPM
P AXIS: 65 DEGREES
P AXIS: 66 DEGREES
P-R INTERVAL: 160 MS
P-R INTERVAL: 166 MS
Q-T INTERVAL: 414 MS
Q-T INTERVAL: 454 MS
QRS DURATION: 80 MS
QRS DURATION: 82 MS
QTC CALCULATION (BEZET): 434 MS
QTC CALCULATION (BEZET): 441 MS
R AXIS: 59 DEGREES
R AXIS: 69 DEGREES
T AXIS: 61 DEGREES
T AXIS: 62 DEGREES
TROPONIN: <0.046 NG/ML (ref ?–0.05)
VENTRICULAR RATE: 57 BPM
VENTRICULAR RATE: 66 BPM

## 2017-08-28 PROCEDURE — 93018 CV STRESS TEST I&R ONLY: CPT | Performed by: INTERNAL MEDICINE

## 2017-08-28 PROCEDURE — 96372 THER/PROPH/DIAG INJ SC/IM: CPT

## 2017-08-28 PROCEDURE — 93005 ELECTROCARDIOGRAM TRACING: CPT

## 2017-08-28 PROCEDURE — 93010 ELECTROCARDIOGRAM REPORT: CPT | Performed by: INTERNAL MEDICINE

## 2017-08-28 PROCEDURE — 84484 ASSAY OF TROPONIN QUANT: CPT | Performed by: INTERNAL MEDICINE

## 2017-08-28 PROCEDURE — 93017 CV STRESS TEST TRACING ONLY: CPT | Performed by: INTERNAL MEDICINE

## 2017-08-28 PROCEDURE — 78452 HT MUSCLE IMAGE SPECT MULT: CPT | Performed by: INTERNAL MEDICINE

## 2017-08-28 RX ORDER — AMINOPHYLLINE DIHYDRATE 25 MG/ML
INJECTION, SOLUTION INTRAVENOUS
Status: COMPLETED
Start: 2017-08-28 | End: 2017-08-28

## 2017-08-28 RX ORDER — ALPRAZOLAM 0.25 MG/1
0.25 TABLET ORAL EVERY 4 HOURS PRN
Qty: 30 TABLET | Refills: 0 | Status: SHIPPED | OUTPATIENT
Start: 2017-08-28

## 2017-08-28 RX ORDER — ROSUVASTATIN CALCIUM 20 MG/1
20 TABLET, COATED ORAL NIGHTLY
Qty: 30 TABLET | Refills: 0 | Status: SHIPPED | OUTPATIENT
Start: 2017-08-28

## 2017-08-28 RX ORDER — AMLODIPINE BESYLATE 5 MG/1
5 TABLET ORAL DAILY
Qty: 30 TABLET | Refills: 0 | Status: SHIPPED | OUTPATIENT
Start: 2017-08-28 | End: 2019-08-12 | Stop reason: ALTCHOICE

## 2017-08-28 NOTE — CONSULTS
[facility]    Cardiology Consultation    Colton Gaston Location: [facility] 2NE-A    1960 MRN [id number]   Consulting Date 2017 [facility] [id number]   Consulting Physician Alisia Johnson MD Attending Physician Carlie Esposito*       Re • Lipids Brother      She reports that she has never smoked. She has never used smokeless tobacco. She reports that she does not drink alcohol or use drugs.     Allergies:  No Known Allergies    Medications:    No current facility-administered medications texture and turgor. Neurologic: Alert and oriented x 3. No dysarthria, facial droop, or gross motor deficits. Psychiatric: Coooperative, calm.     Laboratory Data:  ECG: sinus wendi 57 bpm, normal ST/Ts    Lab Results  Component Value Date   WBC 8.1 08/2

## 2017-08-28 NOTE — HISTORICAL OFFICE NOTE
Vicki Yesy  : 1960  ACCOUNT:  [id number]  [phone]  PCP: Dr. Jaclyn Fink     TODAY'S DATE: 2016  DICTATED BY:  Ta Jamison M.D. ]    CHIEF COMPLAINT: [chest pain, dyspnea and palpitations.]    HPI:    [On 2016, Robel Diaz, a 55 year ol and left cardiac catheterization September 2009    FAMILY HISTORY: Significant for premature CAD. Negative for AAA. SOCIAL HISTORY: SMOKING: Never used tobacco. denies smoking. CAFFEINE: no caffeine. ALCOHOL: drinks rarely. EXERCISE: walks.  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  At this time, I recommend a stress echoc

## 2017-08-28 NOTE — PAYOR COMM NOTE
--------------  ADMISSION REVIEW     Payor: MEDICAID  Subscriber #:  [id number]  Authorization Number: N/A    Admit date: N/A  Admit time: N/A       Admitting Physician: Praful Bowman MD  Attending Physician:  Praful Bowman*  Primary ---------                               -----------         ------                     CBC W/ DIFFERENTIAL[[id number]]          Normal              Final result                 Please view results for these tests on the individual orders.    RACHEL MAURO

## 2017-08-29 NOTE — PLAN OF CARE
Pt discharged at this time. All belongings taken with pt. Pt and  verbalize understanding of DC instructions. Pt tolerating ambulation well. Denies pain or discomfort at present. Pt wheeled off unit with transport.

## 2017-09-07 ENCOUNTER — HOSPITAL ENCOUNTER (OUTPATIENT)
Dept: MAMMOGRAPHY | Facility: HOSPITAL | Age: 57
Discharge: HOME | End: 2017-09-07
Attending: OBSTETRICS & GYNECOLOGY
Payer: COMMERCIAL

## 2017-09-07 DIAGNOSIS — Z12.31 VISIT FOR SCREENING MAMMOGRAM: ICD-10-CM

## 2017-09-07 PROCEDURE — 77067 SCR MAMMO BI INCL CAD: CPT | Performed by: OBSTETRICS & GYNECOLOGY

## 2017-11-09 ENCOUNTER — OFFICE VISIT (OUTPATIENT)
Dept: NEUROLOGY | Facility: CLINIC | Age: 57
End: 2017-11-09

## 2017-11-09 VITALS
SYSTOLIC BLOOD PRESSURE: 122 MMHG | HEART RATE: 72 BPM | RESPIRATION RATE: 16 BRPM | DIASTOLIC BLOOD PRESSURE: 84 MMHG | BODY MASS INDEX: 21 KG/M2 | WEIGHT: 115 LBS

## 2017-11-09 DIAGNOSIS — H81.12 BENIGN PAROXYSMAL POSITIONAL VERTIGO OF LEFT EAR: ICD-10-CM

## 2017-11-09 DIAGNOSIS — G43.709 CHRONIC MIGRAINE WITHOUT AURA WITHOUT STATUS MIGRAINOSUS, NOT INTRACTABLE: Primary | ICD-10-CM

## 2017-11-09 PROCEDURE — 99214 OFFICE O/P EST MOD 30 MIN: CPT | Performed by: OTHER

## 2017-11-09 NOTE — PATIENT INSTRUCTIONS
Refill policies:    • Allow 2-3 business days for refills; controlled substances may take longer.   • Contact your pharmacy at least 5 days prior to running out of medication and have them send an electronic request or submit request through the [facility] have a procedure or additional testing performed. [facility]) will contact your insurance carrier to obtain pre-certification or prior authorization.     Unfortunately, [facility] has seen an increase in denial of payment even though the p

## 2017-11-09 NOTE — PROGRESS NOTES
[facility] in [facility]  Neurology - Clinic Follow up  2016, 1:47 PM     Kaila Vallejo Patient Status:  No patient class for patient encounter    1960 MRN [id number]   Location [unfilled] PCP Kiki Cardona MD     P aneurysms    PAST MEDICAL HISTORY:   Past Medical History:   Diagnosis Date   • Anxiety    • Depression    • Esophageal reflux    • History of blood transfusion    • Migraines    • Other and unspecified hyperlipidemia    • Pneumonia, organism unspecified(4 abnormal bleeding,     Objective:  Blood pressure 122/84, pulse 72, resp. rate 16, weight 115 lb. Body mass index is 21.03 kg/m². Vitals reviewed.   Physical Exam:  General Exam:  Appearance: well developed,  nurished , in no acute distress,   [address]  9/20/2016

## 2017-11-09 NOTE — PROGRESS NOTES
Pt here for migraine follow up. Pt reports she is doing well, with no migraines since last visit. She does report some dizziness in the last few weeks, however.

## 2018-04-11 ENCOUNTER — APPOINTMENT (OUTPATIENT)
Dept: CT IMAGING | Facility: HOSPITAL | Age: 58
End: 2018-04-11
Attending: EMERGENCY MEDICINE
Payer: COMMERCIAL

## 2018-04-11 ENCOUNTER — HOSPITAL ENCOUNTER (EMERGENCY)
Facility: HOSPITAL | Age: 58
Discharge: HOME | End: 2018-04-11
Attending: EMERGENCY MEDICINE
Payer: COMMERCIAL

## 2018-04-11 VITALS
BODY MASS INDEX: 22.28 KG/M2 | DIASTOLIC BLOOD PRESSURE: 65 MMHG | TEMPERATURE: 98 F | HEART RATE: 68 BPM | OXYGEN SATURATION: 97 % | HEIGHT: 61 IN | WEIGHT: 118 LBS | RESPIRATION RATE: 18 BRPM | SYSTOLIC BLOOD PRESSURE: 140 MMHG

## 2018-04-11 DIAGNOSIS — R11.2 NAUSEA AND VOMITING IN ADULT: Primary | ICD-10-CM

## 2018-04-11 DIAGNOSIS — R10.13 EPIGASTRIC PAIN: ICD-10-CM

## 2018-04-11 DIAGNOSIS — E87.6 HYPOKALEMIA: ICD-10-CM

## 2018-04-11 PROCEDURE — 96376 TX/PRO/DX INJ SAME DRUG ADON: CPT

## 2018-04-11 PROCEDURE — 83690 ASSAY OF LIPASE: CPT | Performed by: EMERGENCY MEDICINE

## 2018-04-11 PROCEDURE — 99284 EMERGENCY DEPT VISIT MOD MDM: CPT

## 2018-04-11 PROCEDURE — 85025 COMPLETE CBC W/AUTO DIFF WBC: CPT | Performed by: EMERGENCY MEDICINE

## 2018-04-11 PROCEDURE — 96374 THER/PROPH/DIAG INJ IV PUSH: CPT

## 2018-04-11 PROCEDURE — 74177 CT ABD & PELVIS W/CONTRAST: CPT | Performed by: EMERGENCY MEDICINE

## 2018-04-11 PROCEDURE — 80053 COMPREHEN METABOLIC PANEL: CPT | Performed by: EMERGENCY MEDICINE

## 2018-04-11 PROCEDURE — 81003 URINALYSIS AUTO W/O SCOPE: CPT | Performed by: EMERGENCY MEDICINE

## 2018-04-11 PROCEDURE — 96361 HYDRATE IV INFUSION ADD-ON: CPT

## 2018-04-11 RX ORDER — OYSTER SHELL CALCIUM WITH VITAMIN D 500; 200 MG/1; [IU]/1
1 TABLET, FILM COATED ORAL DAILY
COMMUNITY

## 2018-04-11 RX ORDER — POTASSIUM CHLORIDE 20 MEQ/1
40 TABLET, EXTENDED RELEASE ORAL ONCE
Status: COMPLETED | OUTPATIENT
Start: 2018-04-11 | End: 2018-04-11

## 2018-04-11 RX ORDER — ONDANSETRON 2 MG/ML
4 INJECTION INTRAMUSCULAR; INTRAVENOUS ONCE
Status: COMPLETED | OUTPATIENT
Start: 2018-04-11 | End: 2018-04-11

## 2018-04-11 RX ORDER — ONDANSETRON 4 MG/1
4 TABLET, ORALLY DISINTEGRATING ORAL ONCE
Status: COMPLETED | OUTPATIENT
Start: 2018-04-11 | End: 2018-04-11

## 2018-04-11 RX ORDER — ONDANSETRON 4 MG/1
4 TABLET, ORALLY DISINTEGRATING ORAL EVERY 4 HOURS PRN
Qty: 10 TABLET | Refills: 0 | Status: SHIPPED | OUTPATIENT
Start: 2018-04-11 | End: 2018-04-18

## 2018-04-11 NOTE — ED PROVIDER NOTES
Patient Seen in: [facility] Emergency Department    History   Patient presents with:  Abdomen/Flank Pain (GI/)  Nausea/Vomiting/Diarrhea (gastrointestinal)    Stated Complaint: ABD PAIN    HPI    52-year-old female presents emergency room with chi (36.6 °C)  Temp src: Temporal  SpO2: 100 %  O2 Device: None (Room air)    Current:/84   Pulse 75   Temp 97.8 °F (36.6 °C) (Temporal)   Resp 18   Ht 154.9 cm (5' 1\")   Wt 53.5 kg   SpO2 97%   BMI 22.30 kg/m²         Physical Exam    GENERAL: Patient URINALYSIS WITH CULTURE REFLEX   RAINBOW DRAW BLUE   RAINBOW DRAW LAVENDER   RAINBOW DRAW LIGHT GREEN   RAINBOW DRAW GOLD       ED Course as of Apr 11 0345  ------------------------------------------------------------  Preliminary Radiology Report  [place] [address]  [phone]    Schedule an appointment as soon as possible for a visit in 1 day          Medications Prescribed:  Current Discharge Medication List    START taking these medications    ondansetron 4 MG Oral Tablet Dispersible  Take 1 tablet (4 mg t

## 2018-04-11 NOTE — ED INITIAL ASSESSMENT (HPI)
c/o n/v since 2000 tonight with abd pain, more to the upper quads. States she took Vit E 180 mg 5 tabs around 1900, \"I felt sick an hour later. I thought my  told me to take 5 pills, that's why I took 5. \" Pt denies problems with bowels and bladder

## 2018-04-11 NOTE — ED NOTES
Pt states she needs to urinate. Encouraged pt to sit up to ambulate to bathroom. Pt responded \"no, I want to do it here\". Instructed pt no, and pt ambulated to bathroom with 1 assist.  Pt given urine cup and instructed how to supply sample.

## 2018-04-11 NOTE — ED NOTES
Pt seen dozing, rouses easy. No distress noted. States nausea a little better, \"pain comes and goes. \"

## 2018-04-13 ENCOUNTER — OFFICE VISIT (OUTPATIENT)
Dept: NEUROLOGY | Facility: CLINIC | Age: 58
End: 2018-04-13

## 2018-04-13 VITALS
WEIGHT: 118 LBS | BODY MASS INDEX: 22 KG/M2 | HEART RATE: 68 BPM | RESPIRATION RATE: 16 BRPM | DIASTOLIC BLOOD PRESSURE: 88 MMHG | SYSTOLIC BLOOD PRESSURE: 132 MMHG

## 2018-04-13 DIAGNOSIS — M26.609 TMJ (TEMPOROMANDIBULAR JOINT DISORDER): Primary | ICD-10-CM

## 2018-04-13 DIAGNOSIS — G50.1 ATYPICAL FACIAL PAIN: ICD-10-CM

## 2018-04-13 DIAGNOSIS — G43.709 CHRONIC MIGRAINE WITHOUT AURA WITHOUT STATUS MIGRAINOSUS, NOT INTRACTABLE: ICD-10-CM

## 2018-04-13 PROCEDURE — 99213 OFFICE O/P EST LOW 20 MIN: CPT | Performed by: OTHER

## 2018-04-13 RX ORDER — ERYTHROMYCIN 5 MG/G
OINTMENT OPHTHALMIC
COMMUNITY
Start: 2018-03-27 | End: 2020-01-22

## 2018-04-13 RX ORDER — FLUCONAZOLE 100 MG/1
TABLET ORAL AS NEEDED
COMMUNITY
Start: 2018-01-30 | End: 2018-11-20

## 2018-04-13 RX ORDER — CYCLOSPORINE 0.5 MG/ML
EMULSION OPHTHALMIC EVERY 12 HOURS
COMMUNITY
Start: 2018-04-10

## 2018-04-13 RX ORDER — LOTEPREDNOL ETABONATE 5 MG/G
GEL OPHTHALMIC
COMMUNITY
Start: 2018-04-03 | End: 2020-01-22

## 2018-04-13 NOTE — PATIENT INSTRUCTIONS
Refill policies:    • Allow 2-3 business days for refills; controlled substances may take longer.   • Contact your pharmacy at least 5 days prior to running out of medication and have them send an electronic request or submit request through the [facility] for the entire amount billed. Precertification and Prior Authorizations  If your physician has recommended that you have a procedure or additional testing performed.   [facility] ([place]) will contact your insurance carrier to obtain pr

## 2018-04-13 NOTE — PROGRESS NOTES
Pt here for migraine follow up. Reports migraines have been ok since last visit, but has been experiencing brief periods of pain throughout the day.

## 2018-04-13 NOTE — PROGRESS NOTES
[facility] in [place]  Neurology - Clinic Follow up      Marce Thomas Patient Status:  No patient class for patient encounter    1960 MRN [id number]   Location [unfilled] PCP Agnes Loya MD     Patient presents wit seconds long, in her left temple. She will get them about 2-3 times per week, every 2-3 weeks.     Data review:  CTA H and N 1/2017- no stenosis or aneurysms    PAST MEDICAL HISTORY:   Past Medical History:   Diagnosis Date   • Anxiety    • Depression    • VAGINALLY FOR 30 DAYS Disp:  Rfl: 3   lansoprazole (PREVACID) 30 MG Oral Capsule Delayed Release Take 30 mg by mouth 2 (two) times daily. Disp:  Rfl:        REVIEW OF SYSTEMS:  General: denies any fever or chills.      Eye: no pain or redness;  Ear, mouth, Special tests:         Assessment/Plan:   1. TMJ disorder and secondary pain vs. Other secondary muscle or facial pain due to bruxism  2. Migraines without aura- improved  3.  Anxiety    Discussion/recommendations:  She had tenderness to palpation of her

## 2018-06-22 ENCOUNTER — TELEPHONE (OUTPATIENT)
Dept: NEUROLOGY | Facility: CLINIC | Age: 58
End: 2018-06-22

## 2018-06-22 NOTE — TELEPHONE ENCOUNTER
Form completed and signed by Dr. Dilma Greer, indicated pt is cleared for procedure. Faxed back to [facility] with fax confirmation rec'd.

## 2018-06-22 NOTE — TELEPHONE ENCOUNTER
Received neurology clearance form from [address] [place] [address] for an EGD/colonoscopty. Placed on Dr Vinh Reyes desk for signature.

## 2018-07-23 NOTE — TELEPHONE ENCOUNTER
Rec'd call from [facility] in preadmission department requesting clearance from neurology for pts upcoming GI procedure.   Indicated that the clearance form was signed by Dr. Candee Eisenmenger and faxed back to [address] [place] [address] on 6/22/18, and that we do not keep a copy of t

## 2018-07-23 NOTE — PAT NURSING NOTE
Called ,neurologist office and spoke to nurse to request Clearance note for 7/27/18  Procedure with anesthesia. She stated clearance note done 6/22/18 and faxed to [facility]. Office does not save notes. [facility] left message for Dayne Rodriguez

## 2018-07-25 NOTE — PAT NURSING NOTE
I spoke to Soledad Khan at [facility] office requesting Neurologist clearance note be faxed.

## 2018-07-26 ENCOUNTER — ANESTHESIA EVENT (OUTPATIENT)
Dept: ENDOSCOPY | Facility: HOSPITAL | Age: 58
End: 2018-07-26
Payer: COMMERCIAL

## 2018-07-26 NOTE — ANESTHESIA PREPROCEDURE EVALUATION
PRE-OP EVALUATION    Patient Name: Shannan Lorenzo    Pre-op Diagnosis: Heartburn [R12]  Hx of colonic polyps [Z86.010]  Left upper quadrant pain [R10.12]  Change in bowel habits [R19.4]  Epigastric discomfort [R10.13]  Nausea and vomiting, intractability of v history of anesthetic complications  History of: PONV       GI/Hepatic/Renal      (+) GERD                           Cardiovascular                  (+) hypertension   (+) hyperlipidemia                         (+) VÁSQUEZ         Endo/Other

## 2018-07-27 ENCOUNTER — ANESTHESIA (OUTPATIENT)
Dept: ENDOSCOPY | Facility: HOSPITAL | Age: 58
End: 2018-07-27
Payer: COMMERCIAL

## 2018-07-27 ENCOUNTER — HOSPITAL ENCOUNTER (OUTPATIENT)
Facility: HOSPITAL | Age: 58
Setting detail: HOSPITAL OUTPATIENT SURGERY
Discharge: HOME | End: 2018-07-27
Attending: STUDENT IN AN ORGANIZED HEALTH CARE EDUCATION/TRAINING PROGRAM | Admitting: STUDENT IN AN ORGANIZED HEALTH CARE EDUCATION/TRAINING PROGRAM
Payer: COMMERCIAL

## 2018-07-27 VITALS
HEART RATE: 64 BPM | RESPIRATION RATE: 16 BRPM | OXYGEN SATURATION: 99 % | HEIGHT: 61 IN | BODY MASS INDEX: 21.71 KG/M2 | TEMPERATURE: 99 F | SYSTOLIC BLOOD PRESSURE: 130 MMHG | WEIGHT: 115 LBS | DIASTOLIC BLOOD PRESSURE: 79 MMHG

## 2018-07-27 DIAGNOSIS — Z86.010 HX OF COLONIC POLYPS: ICD-10-CM

## 2018-07-27 DIAGNOSIS — R19.4 CHANGE IN BOWEL HABITS: ICD-10-CM

## 2018-07-27 DIAGNOSIS — R10.12 LEFT UPPER QUADRANT PAIN: ICD-10-CM

## 2018-07-27 DIAGNOSIS — R11.2 NAUSEA AND VOMITING, INTRACTABILITY OF VOMITING NOT SPECIFIED, UNSPECIFIED VOMITING TYPE: ICD-10-CM

## 2018-07-27 DIAGNOSIS — R10.13 EPIGASTRIC DISCOMFORT: ICD-10-CM

## 2018-07-27 DIAGNOSIS — R12 HEARTBURN: ICD-10-CM

## 2018-07-27 PROCEDURE — 0DJD8ZZ INSPECTION OF LOWER INTESTINAL TRACT, VIA NATURAL OR ARTIFICIAL OPENING ENDOSCOPIC: ICD-10-PCS | Performed by: STUDENT IN AN ORGANIZED HEALTH CARE EDUCATION/TRAINING PROGRAM

## 2018-07-27 PROCEDURE — 88305 TISSUE EXAM BY PATHOLOGIST: CPT | Performed by: STUDENT IN AN ORGANIZED HEALTH CARE EDUCATION/TRAINING PROGRAM

## 2018-07-27 PROCEDURE — 0DB68ZX EXCISION OF STOMACH, VIA NATURAL OR ARTIFICIAL OPENING ENDOSCOPIC, DIAGNOSTIC: ICD-10-PCS | Performed by: STUDENT IN AN ORGANIZED HEALTH CARE EDUCATION/TRAINING PROGRAM

## 2018-07-27 RX ORDER — NALOXONE HYDROCHLORIDE 0.4 MG/ML
80 INJECTION, SOLUTION INTRAMUSCULAR; INTRAVENOUS; SUBCUTANEOUS AS NEEDED
Status: DISCONTINUED | OUTPATIENT
Start: 2018-07-27 | End: 2018-07-27

## 2018-07-27 RX ORDER — HYDROCODONE BITARTRATE AND ACETAMINOPHEN 5; 325 MG/1; MG/1
1 TABLET ORAL AS NEEDED
Status: DISCONTINUED | OUTPATIENT
Start: 2018-07-27 | End: 2018-07-27

## 2018-07-27 RX ORDER — ACETAMINOPHEN 500 MG
1000 TABLET ORAL ONCE AS NEEDED
Status: DISCONTINUED | OUTPATIENT
Start: 2018-07-27 | End: 2018-07-27

## 2018-07-27 RX ORDER — MIDAZOLAM HYDROCHLORIDE 1 MG/ML
1 INJECTION INTRAMUSCULAR; INTRAVENOUS EVERY 5 MIN PRN
Status: DISCONTINUED | OUTPATIENT
Start: 2018-07-27 | End: 2018-07-27

## 2018-07-27 RX ORDER — ONDANSETRON 2 MG/ML
4 INJECTION INTRAMUSCULAR; INTRAVENOUS AS NEEDED
Status: DISCONTINUED | OUTPATIENT
Start: 2018-07-27 | End: 2018-07-27

## 2018-07-27 RX ORDER — SODIUM CHLORIDE, SODIUM LACTATE, POTASSIUM CHLORIDE, CALCIUM CHLORIDE 600; 310; 30; 20 MG/100ML; MG/100ML; MG/100ML; MG/100ML
INJECTION, SOLUTION INTRAVENOUS CONTINUOUS
Status: DISCONTINUED | OUTPATIENT
Start: 2018-07-27 | End: 2018-07-27

## 2018-07-27 RX ORDER — HYDROCODONE BITARTRATE AND ACETAMINOPHEN 5; 325 MG/1; MG/1
2 TABLET ORAL AS NEEDED
Status: DISCONTINUED | OUTPATIENT
Start: 2018-07-27 | End: 2018-07-27

## 2018-07-27 NOTE — OPERATIVE REPORT
EGD operative report  Patient Name: Chrissy Nation  Procedure: Esophagogastroduodenoscopy with biopsy   Indication: epigastric pain  Attending: Elke Manley M.D. Consent:  The risks, benefits, and alternatives were discussed with the patient / POA.   Risks

## 2018-07-27 NOTE — ANESTHESIA POSTPROCEDURE EVALUATION
[address] Patient Status:  Hospital Outpatient Surgery   Age/Gender 63year old female MRN [id number]   Location [facility]. Attending Mettu, Isela Zarate MD   Hosp Day # 0 PCP Jorge Luis Whelan MD       Anesthesia

## 2018-07-27 NOTE — OPERATIVE REPORT
Colon operative report  Patient Name: Yaritza Ness  Procedure: Colonoscopy   Indication: History of tubular adenoma (2013)  Attending: bA Perez M.D. Consent: The risks, benefits, and alternatives were discussed with the patient / POA.   Risks included Otherwise normal exam without polyps or inflammation. Impression: Findings as above.     Recommendations:       - Repeat colonoscopy in 6-12 months to complete exam     Robin Cantor MD

## 2018-08-07 ENCOUNTER — HOSPITAL ENCOUNTER (OUTPATIENT)
Dept: GENERAL RADIOLOGY | Facility: HOSPITAL | Age: 58
Discharge: HOME | End: 2018-08-07
Attending: INTERNAL MEDICINE
Payer: COMMERCIAL

## 2018-08-07 DIAGNOSIS — S62.91XA RIGHT HAND FRACTURE: ICD-10-CM

## 2018-08-07 PROCEDURE — 73130 X-RAY EXAM OF HAND: CPT | Performed by: INTERNAL MEDICINE

## 2018-08-14 NOTE — H&P
Gastroenterology H/P  I have personally seen and examined the patient. Patient Name: Steven Barrera  CC: epigastric pain, history of colon polyps  HPI: Patient seen just over 1 month ago in the office (6/20/18).   She has chronic intermittent epigastric shirley Rfl:    erythromycin 5 MG/GM Ophthalmic Ointment  Disp:  Rfl:    fluconazole 100 MG Oral Tab as needed. Disp:  Rfl:    LOTEMAX 0.5 % Ophthalmic Gel  Disp:  Rfl:    Calcium Carbonate-Vitamin D 500-200 MG-UNIT Oral Tab Take 1 tablet by mouth daily.  Disp:  R history of known chronic anemia            Dermatologic: The patient reports no recent rashes or chronic skin disorders            Rheumatologic: The patient reports no history of chronic arthritis, myalgias, arthralgias            Genitourinary:  The carlos last 168 hours. No results for input(s): ALKPHOS, ALT, AST in the last 168 hours. Invalid input(s): BILITOT, BILIDIR, PROT, LABALBU    Impression:   1. Epigastric pain  2. Nausea and vomiting  3.  History of colon polyps    Recommendations:   EGD and

## 2018-10-08 NOTE — ED NOTES
HPI  Gladis Singh is a 65y.o. year old female patient who presents with c/o est. Care. Pt has history of has Depression, Insomnia, Chronic bronchitis, obstructive ([address] GERD (gastroesophageal reflux disease), Cervical spinal stenosis, Hiatal hernia, Hoyos's esophagus determined by endoscopy, and Constipation on her problem list..    HTN/Tachycardia  Pt was told HTN in past, was on lisinopril 20 in 2015. Told it's high multiple times at Dr. Rosalio Juan office in 140s/80s, . Denies chest pain, chest pressure, or palpitations. Denies orthopnea or PND. Sometimes has LE swelling when seated for long periods- has been on lasix in the past prn. Denies HA, dizziness, or blurred vision. Denies N/V/D or abd pain. Thinks she may have seen cardiologist long time ago. Pt denies current exercise, use to walk in the past.     COPD  Managed on Breo, Spiriva, albuterol. [facility] at [facility], they no longer take her ins. Doesn't need albuterol often, hasn't used in weeks. Denies SOB, cough or wheezing. Quit smoking 20 years ago. Depression/anxiety/ADHD  Follows with psych Dr. Lexie Johnson at [facility], follows with him Q 3 months. Managed with Adderall, trazodone, Wellbutrin  Was on lexapro in the past, gained lots of weight. Family hx of breast CA  Mom diagnosed at age 80  Never had abnormal mammogram, last one was 2015    GERD/Hoyos's esophagus  Told Hoyos's and hiatal hernia after doing clinical trial EGD at [facility] in March. C/o frequent heartburn, indigestion,and has post-food emesis if eats late at night. Takes pepcid 20 daily  Uses tums prn. Tried nexium for 1 month didn't help.      Hx of cervical stenosis  -seen at [facility]  in the past for pinched nerve, tried on gabapentin which made her feel funny  -also used lidocaine patches    Hx of consitpation- takes laxative prn, has BM once a week on average  -took miralax in the past, can't afford it        Patient Active Problem List Pt updated on poc. Spouse at bedside. Ready for XR   No distress noted. Diagnosis Code    Depression F32.9    Insomnia G47.00    Chronic bronchitis, obstructive (HCC) J44.9    GERD (gastroesophageal reflux disease) K21.9    Cervical spinal stenosis M48.02    Hiatal hernia K44.9    Hoyos's esophagus determined by endoscopy K22.70    Constipation K59.00    Breast CA ([address] C50.919     Past Medical History:   Diagnosis Date    Acid reflux     Asthma 4/15/2010    Hoyos esophagus     Depression 4/15/2010     No past surgical history on file. Social History     Social History    Marital status: SINGLE     Spouse name: N/A    Number of children: N/A    Years of education: N/A     Social History Main Topics    Smoking status: Former Smoker     Types: Cigarettes     Quit date: 1/1/2000    Smokeless tobacco: Never Used    Alcohol use No    Drug use: No    Sexual activity: Yes     Other Topics Concern    None     Social History Narrative     Family History   Problem Relation Age of Onset    Cancer Mother      Allergies   Allergen Reactions    Naprosyn [Naproxen] Hives and Diarrhea     ankle swelling    Prozac [Fluoxetine] Anxiety       MEDICATIONS  Current Outpatient Prescriptions   Medication Sig    docusate sodium (COLACE) 50 mg capsule Take 50 mg by mouth two (2) times a day.  famotidine (PEPCID) 40 mg tablet Take 1 Tab by mouth two (2) times a day.  BREO ELLIPTA 100-25 mcg/dose inhaler take 1 inhalation by mouth once daily    SPIRIVA WITH HANDIHALER 18 mcg inhalation capsule inhale contents of 1 capsule by mouth once daily    traZODone (DESYREL) 100 mg tablet     buPROPion SR (WELLBUTRIN, ZYBAN) 200 mg SR tablet two (2) times a day.  ibuprofen 200 mg cap Take 400 mg by mouth.  albuterol (PROVENTIL HFA, VENTOLIN HFA, PROAIR HFA) 90 mcg/actuation inhaler Take 1 Puff by inhalation every four (4) hours as needed for Wheezing.  cholecalciferol (VITAMIN D3) 1,000 unit tablet Take 5,000 Units by mouth daily.     CA CMB NO.1/VIT D3/B-6/FA/B12 (VITAMIN D3, CALCIUM CIT-PHOS, PO) Take  by mouth.  dextroamphetamine-amphetamine (ADDERALL) 20 mg tablet TAKE 1 TABELT BY MOUTH TWICE A DAY     No current facility-administered medications for this visit. REVIEW OF SYSTEMS  Per HPI        Visit Vitals    /80    Pulse 90    Temp 98.2 °F (36.8 °C) (Oral)    Resp 18    Ht 5' 6\" (1.676 m)    Wt 183 lb 9.6 oz (83.3 kg)    SpO2 94%    BMI 29.63 kg/m2         General: Well-developed, well-nourished. In no distress. A&O x 3. Head: Normocephalic, atraumatic. Eyes: Conjunctiva clear. Pupils equal, round, reactive to light. Extraocular movements intact. Ears/Nose: TM's and ear canals normal bilaterally. Nares normal. Septum midline. Normal nasal mucosa. No drainage or sinus tenderness. Mouth/Throat: Lips, mucosa, and tongue normal. Oropharynx benign. Neck: Supple, symmetrical, trachea midline, no lymphadenopathy, no carotid bruits, no JVD, thyroid: not enlarged, symmetric, no tenderness/mass/nodules. Lungs: Clear to auscultation bilaterally. No crackles or wheezes. No use of accessory muscles. Speaks in full sentences without SOB. Chest Wall: No tenderness or deformity. Heart: RRR, normal S1 and S2, no murmur, click, rub, or gallop. Skin: No rashes or lesions. Neurovasc: No edema appreciated. Dorsalis pedis pulses are 2+ on the right side, and 2+ on the left side. Posterior tibial pulses are 2+ on the right side, and 2+ on the left side. Musculoskeletal: Gait normal.  Psychiatric: Normal mood and affect. Behavior is normal.     EKG:   Reviewed with Dr. Callum Brothers  Sinus tachycardia      ASSESSMENT and PLAN  Diagnoses and all orders for this visit:    1. Encounter to establish care  -     METABOLIC PANEL, COMPREHENSIVE    2.  Tachycardia  -     TSH RFX ON ABNORMAL TO FREE T4  -     CBC WITH AUTOMATED DIFF  -     AMB POC EKG ROUTINE W/ 12 LEADS, INTER & REP  -Will request records from previous cardiac evaluation and compare EKG's  -May need repeat ECHO    3. Hiatal hernia  -     Juwan Olivia [facility]  -See #3    4. Hoyos's esophagus determined by endoscopy  -     Millie [facility]man [facility]  -discussed importance of GI f/u given severity of symptoms    5. Chronic bronchitis, unspecified chronic bronchitis type ([address]  -well-controlled    6. Gastroesophageal reflux disease, esophagitis presence not specified  -     famotidine (PEPCID) 40 mg tablet; Take 1 Tab by mouth two (2) times a day. Celia Lynn [facility]  -GERD uncontrolled, discussed importance of following up with GI specialist given severity of symptoms    7. Depression, unspecified depression type  -managed by Dr. Rajiv Yan    8. Constipation, unspecified constipation type  Recommend pt start metamucil, drink plenty of fluids, get routine exercise    9. Screening for diabetes mellitus  -     HEMOGLOBIN A1C WITH EAG  -+family hx of DM    10. Screening for hypercholesterolemia  -     LIPID PANEL    11. Screening for breast cancer  -     [facility] MAMMO BI SCREENING INCL CAD; Future  -pt states she may wait until January when insurance coverage  -advised she may be able to locate a free screening             Patient Instructions          Constipation: Care Instructions  Your Care Instructions    Constipation means that you have a hard time passing stools (bowel movements). People pass stools from 3 times a day to once every 3 days. What is normal for you may be different. Constipation may occur with pain in the rectum and cramping. The pain may get worse when you try to pass stools. Sometimes there are small amounts of bright red blood on toilet paper or the surface of stools. This is because of enlarged veins near the rectum (hemorrhoids). A few changes in your diet and lifestyle may help you avoid ongoing constipation. Your doctor may also prescribe medicine to help loosen your stool. Some medicines can cause constipation. These include pain medicines and antidepressants.  Tell your doctor about all the medicines you take. Your doctor may want to make a medicine change to ease your symptoms. Follow-up care is a key part of your treatment and safety. Be sure to make and go to all appointments, and call your doctor if you are having problems. It's also a good idea to know your test results and keep a list of the medicines you take. How can you care for yourself at home? · Drink plenty of fluids, enough so that your urine is light yellow or clear like water. If you have kidney, heart, or liver disease and have to limit fluids, talk with your doctor before you increase the amount of fluids you drink. · Include high-fiber foods in your diet each day. These include fruits, vegetables, beans, and whole grains. · Get at least 30 minutes of exercise on most days of the week. Walking is a good choice. You also may want to do other activities, such as running, swimming, cycling, or playing tennis or team sports. · Take a fiber supplement, such as Citrucel or Metamucil, every day. Read and follow all instructions on the label. · Schedule time each day for a bowel movement. A daily routine may help. Take your time having your bowel movement. · Support your feet with a small step stool when you sit on the toilet. This helps flex your hips and places your pelvis in a squatting position. · Your doctor may recommend an over-the-counter laxative to relieve your constipation. Examples are Milk of Magnesia and MiraLax. Read and follow all instructions on the label. Do not use laxatives on a long-term basis. When should you call for help? Call your doctor now or seek immediate medical care if:    · You have new or worse belly pain.     · You have new or worse nausea or vomiting.     · You have blood in your stools.    Watch closely for changes in your health, and be sure to contact your doctor if:    · Your constipation is getting worse.     · You do not get better as expected. Where can you learn more?   Go to http://barrie.info/. Enter 21  in the search box to learn more about \"Constipation: Care Instructions. \"  Current as of: November 20, 2017  Content Version: 11.8  © 5267-7391 [employer]. Care instructions adapted under license by [employer] (which disclaims liability or warranty for this information). If you have questions about a medical condition or this instruction, always ask your healthcare professional. [address] any warranty or liability for your use of this information. Learning About Chronic Bronchitis  What is chronic bronchitis? Chronic bronchitis is long-term swelling and the buildup of mucus in the airways of your lungs. The airways (bronchial tubes) get inflamed and make a lot of mucus. This can narrow or block the airways, making it hard for you to breathe. It is a form of COPD (chronic obstructive pulmonary disease). Chronic bronchitis is usually caused by smoking. But chemical fumes, dust, or air pollution also can cause it over time. What can you expect when you have chronic bronchitis? Chronic bronchitis gets worse over time. You cannot undo the damage to your lungs. Over time, you may find that:  · You get short of breath even when you do simple things like get dressed or fix a meal.  · It is hard to eat or exercise. · You lose weight and feel weaker. Over many years, the swelling and mucus from chronic bronchitis make it more likely that you will get lung infections. But there are things you can do to prevent more damage and feel better. What are the symptoms? The main symptoms of chronic bronchitis are:  · A cough that will not go away. · Mucus that comes up when you cough. · Shortness of breath that gets worse when you exercise. At times, your symptoms may suddenly flare up and get much worse. This is a called an exacerbation (say \"egg-KENAN-er-BAY-holly\").  When this happens, your usual symptoms quickly get worse and stay bad. This can be dangerous. You may have to go to the hospital.  How can you keep chronic bronchitis from getting worse? Don't smoke. That is the best way to keep chronic bronchitis from getting worse. If you already smoke, it is never too late to stop. If you need help quitting, talk to your doctor about stop-smoking programs and medicines. These can increase your chances of quitting for good. You can do other things to keep chronic bronchitis from getting worse:  · Avoid bad air. Air pollution, chemical fumes, and dust also can make chronic bronchitis worse. · Get a flu shot every year. A shot may keep the flu from turning into something more serious, like pneumonia. A flu shot also may lower your chances of having a flare-up. · Get a pneumococcal shot. A shot can prevent some of the serious complications of pneumonia. Ask your doctor how often you should get this shot. How is chronic bronchitis treated? Chronic bronchitis is treated with medicines and oxygen. You also can take steps at home to stay healthy and keep your condition from getting worse. Medicines and oxygen therapy  · You may be taking medicines such as:  ¨ Bronchodilators. These help open your airways and make breathing easier. Bronchodilators are either short-acting (work for 6 to 9 hours) or long-acting (work for 24 hours). You inhale most bronchodilators, so they start to act quickly. Always carry your quick-relief inhaler with you in case you need it while you are away from home. ¨ Corticosteroids. These reduce airway inflammation. They come in pill or inhaled form. You must take these medicines every day for them to work well. ¨ Antibiotics. These medicines are used when you have a bacterial lung infection. · Take your medicines exactly as prescribed. Call your doctor if you think you are having a problem with your medicine.   · Oxygen therapy boosts the amount of oxygen in your blood and helps you breathe easier. Use the flow rate your doctor has recommended, and do not change it without talking to your doctor first.  Other care at home  · If your doctor recommends it, get more exercise. Walking is a good choice. Bit by bit, increase the amount you walk every day. Try for at least 30 minutes on most days of the week. · Learn breathing methods--such as breathing through pursed lips--to help you become less short of breath. · If your doctor has not set you up with a pulmonary rehabilitation program, talk to him or her about whether rehab is right for you. Rehab includes exercise programs, education about your disease and how to manage it, help with diet and other changes, and emotional support. · Eat regular, healthy meals. Use bronchodilators about 1 hour before you eat to make it easier to eat. Eat several small meals instead of three large ones. Drink beverages at the end of the meal. Avoid foods that are hard to chew. Follow-up care is a key part of your treatment and safety. Be sure to make and go to all appointments, and call your doctor if you are having problems. It's also a good idea to know your test results and keep a list of the medicines you take. Where can you learn more? Go to http://loreto-inocente.info/. Enter T506 in the search box to learn more about \"Learning About Chronic Bronchitis. \"  Current as of: December 6, 2017  Content Version: 11.8  © 3632-5187 [employer]. Care instructions adapted under license by [employer] (which disclaims liability or warranty for this information). If you have questions about a medical condition or this instruction, always ask your healthcare professional. [place] [address] any warranty or liability for your use of this information. Hoyos's Esophagus: Care Instructions  Your Care Instructions    The esophagus is the tube that connects the throat to the stomach.  Food and liquids go through this tube.  In Hoyos's esophagus, the cells that line the tube change. This is usually because of gastroesophageal reflux disease (GERD). GERD causes acid from your stomach to back up into the esophagus. When you have Hoyos's esophagus, you are slightly more likely to get cancer of the esophagus. So regular testing is important to watch for signs of this cancer. You can treat GERD to control your symptoms and feel better. Follow-up care is a key part of your treatment and safety. Be sure to make and go to all appointments, and call your doctor if you are having problems. It's also a good idea to know your test results and keep a list of the medicines you take. How can you care for yourself at home? · Take your medicines exactly as prescribed. Call your doctor if you think you are having a problem with your medicine. · If you take over-the-counter medicine, such as antacids or acid reducers, follow all instructions on the label. If you use these medicines often, talk with your doctor. Be careful when you take over-the-counter antacid medicines. Many of these medicines have aspirin in them. Read the label to make sure that you are not taking more than the recommended dose. Too much aspirin can be harmful. · Do not smoke or chew tobacco. Smoking can make GERD worse. If you need help quitting, talk to your doctor about stop-smoking programs and medicines. These can increase your chances of quitting for good. · Chocolate, mint, and alcohol can make GERD worse. They relax the valve between the esophagus and the stomach. · Spicy foods, foods that have a lot of acid (like tomatoes and oranges), and coffee can make GERD symptoms worse in some people. If your symptoms are worse after you eat a certain food, you may want to stop eating that food to see if your symptoms get better. · Eat smaller meals, and more often. After eating, wait 2 to 3 hours before you lie down.   · Raise the head of your bed 6 to 8 inches by putting blocks under the frame or a foam wedge under the head of the mattress. · Do not wear tight clothing around your midsection. · If you are overweight, lose weight. Even losing 5 to 10 pounds can help. When should you call for help? Call your doctor now or seek immediate medical care if:    · You have new or worse belly pain.     · You are vomiting.    Watch closely for changes in your health, and be sure to contact your doctor if:    · You have any pain or difficulty swallowing.     · You are losing weight.     · You have new or worse symptoms, such as heartburn.     · You do not get better as expected. Where can you learn more? Go to http://loretoTechtiuminocente.info/. Enter L146 in the search box to learn more about \"Hoyos's Esophagus: Care Instructions. \"  Current as of: March 28, 2018  Content Version: 11.8  © 1839-3418 [employer]. Care instructions adapted under license by [employer] (which disclaims liability or warranty for this information). If you have questions about a medical condition or this instruction, always ask your healthcare professional. [place] [address] any warranty or liability for your use of this information. Follow-up Disposition:  Return in about 1 month (around 11/8/2018), or if symptoms worsen or fail to improve, for BP, tachycardia. Health Maintenance Due   Topic Date Due    DTaP/Tdap/Td series (1 - Tdap) 07/08/1978    Shingrix Vaccine Age 50> (1 of 2) 07/08/2007    FOBT Q 1 YEAR AGE 50-75  07/08/2007    BREAST CANCER SCRN MAMMOGRAM  10/27/2017    Influenza Age 5 to Adult  08/01/2018   Had flu shot in September. Knows she needs mammo. Had FIT test one week ago. I have discussed the diagnosis with the patient and the intended plan as seen in the above orders. Patient is in agreement. The patient has received an after-visit summary and questions were answered concerning future plans.   I have discussed medication side effects and warnings with the patient as well. Warning signs for the above conditions were discussed including when to call our office or go to the emergency room. The nurse provided the patient and/or family with advanced directive information if needed and encouraged the patient to provide a copy to the office when available.

## 2018-10-29 ENCOUNTER — HOSPITAL ENCOUNTER (EMERGENCY)
Facility: HOSPITAL | Age: 58
Discharge: HOME | End: 2018-10-29
Attending: EMERGENCY MEDICINE
Payer: COMMERCIAL

## 2018-10-29 ENCOUNTER — APPOINTMENT (OUTPATIENT)
Dept: GENERAL RADIOLOGY | Facility: HOSPITAL | Age: 58
End: 2018-10-29
Attending: EMERGENCY MEDICINE
Payer: COMMERCIAL

## 2018-10-29 VITALS
TEMPERATURE: 99 F | HEART RATE: 75 BPM | RESPIRATION RATE: 18 BRPM | SYSTOLIC BLOOD PRESSURE: 136 MMHG | DIASTOLIC BLOOD PRESSURE: 92 MMHG | OXYGEN SATURATION: 98 %

## 2018-10-29 DIAGNOSIS — J06.9 VIRAL UPPER RESPIRATORY TRACT INFECTION: Primary | ICD-10-CM

## 2018-10-29 PROCEDURE — 71046 X-RAY EXAM CHEST 2 VIEWS: CPT | Performed by: EMERGENCY MEDICINE

## 2018-10-29 PROCEDURE — 99283 EMERGENCY DEPT VISIT LOW MDM: CPT

## 2018-10-29 NOTE — ED PROVIDER NOTES
Patient Seen in: [facility] Emergency Department    History   Patient presents with:  Cough/URI  Dizziness (neurologic)    Stated Complaint: cough back pain dizziness    HPI    59-year-old female comes the hospital complaint of having difficulty wit [facility] ENDOSCOPY   • COLONOSCOPY Left 7/27/2018    Performed by Milagros Doherty MD at [facility] ENDOSCOPY   • ESOPHAGOGASTRODUODENOSCOPY (EGD) N/A 7/27/2018    Performed by Meme Henderson MD at [address] [place] [address] O History CARDIAC:  Normal size cardiac silhouette. MEDIASTINUM:  Normal. PLEURA:  Normal.  No pleural effusions. BONES:  Normal for age. CONCLUSION:  No acute disease.       Dictated by: Juma Ward MD on 10/29/2018 at 8:19     Approved by: Juma Ward MD

## 2018-11-20 ENCOUNTER — OFFICE VISIT (OUTPATIENT)
Dept: UROLOGY | Facility: HOSPITAL | Age: 58
End: 2018-11-20
Attending: OBSTETRICS & GYNECOLOGY
Payer: COMMERCIAL

## 2018-11-20 VITALS — WEIGHT: 115 LBS | HEIGHT: 61 IN | BODY MASS INDEX: 21.71 KG/M2

## 2018-11-20 DIAGNOSIS — N95.2 POSTMENOPAUSAL ATROPHIC VAGINITIS: ICD-10-CM

## 2018-11-20 DIAGNOSIS — N94.10 DYSPAREUNIA, FEMALE: ICD-10-CM

## 2018-11-20 DIAGNOSIS — R31.29 HEMATURIA, MICROSCOPIC: ICD-10-CM

## 2018-11-20 DIAGNOSIS — R10.2 SUPRAPUBIC PRESSURE: ICD-10-CM

## 2018-11-20 DIAGNOSIS — N81.84 PELVIC MUSCLE WASTING: ICD-10-CM

## 2018-11-20 DIAGNOSIS — R30.0 DYSURIA: Primary | ICD-10-CM

## 2018-11-20 PROCEDURE — 87086 URINE CULTURE/COLONY COUNT: CPT | Performed by: OBSTETRICS & GYNECOLOGY

## 2018-11-20 PROCEDURE — 99201 HC OUTPT EVAL AND MGNT NEW PT LEVEL 1: CPT

## 2018-11-20 PROCEDURE — 81001 URINALYSIS AUTO W/SCOPE: CPT | Performed by: OBSTETRICS & GYNECOLOGY

## 2018-11-20 RX ORDER — ESTRADIOL 0.1 MG/G
CREAM VAGINAL
Qty: 1 TUBE | Refills: 2 | Status: SHIPPED | OUTPATIENT
Start: 2018-11-20 | End: 2019-07-09

## 2018-11-20 NOTE — PROGRESS NOTES
Luan Leal DO  11/20/2018     Referred by Dr. Juma Emerson    Patient presents with:  Bladder Infection    She reports UTIs - sx include dysuria, itching, suprapubic pain  8 UTIs in 2018, per pt  abx with sx, somewhat improved    HPI:  No ALISSA  No UUI  No pr Mario Gottlieb MD at [facility] ENDOSCOPY   • REMOVAL GALLBLADDER        Family History   Problem Relation Age of Onset   • Heart Disorder Father    • Heart Disorder Mother    • Hypertension Sister    • Lipids Sister    • Other (Other) Sister         osteopor FOR 30 DAYS Disp:  Rfl: 3   lansoprazole (PREVACID) 30 MG Oral Capsule Delayed Release Take 30 mg by mouth 2 (two) times daily. Disp:  Rfl:    PEG 3350-KCl-NaBcb-NaCl-NaSulf (PEG 3350/ELECTROLYTES) 240 g Oral Recon Soln Take as directed by physician.  Disp: Postmenopausal atrophic vaginitis    (R31.29) Hematuria, microscopic      Discussion Items:   Urodynamics and cystoscopy for evaluation of LUTS  Behavioral and pharmacologic treatments for OAB  Pelvic muscle rehabilitation including pelvic floor PT  [place]

## 2018-11-26 ENCOUNTER — TELEPHONE (OUTPATIENT)
Dept: UROLOGY | Facility: HOSPITAL | Age: 58
End: 2018-11-26

## 2018-11-26 NOTE — TELEPHONE ENCOUNTER
Patient doesn't speak [place], spoke with , who speaks [place]. Informed him urine culture negative, pt does not have an infection. He will inform his wife.

## 2018-12-04 ENCOUNTER — OFFICE VISIT (OUTPATIENT)
Dept: UROLOGY | Facility: HOSPITAL | Age: 58
End: 2018-12-04
Attending: OBSTETRICS & GYNECOLOGY
Payer: COMMERCIAL

## 2018-12-04 VITALS — DIASTOLIC BLOOD PRESSURE: 76 MMHG | SYSTOLIC BLOOD PRESSURE: 124 MMHG

## 2018-12-04 DIAGNOSIS — R30.0 DYSURIA: Primary | ICD-10-CM

## 2018-12-04 PROCEDURE — 51741 ELECTRO-UROFLOWMETRY FIRST: CPT

## 2018-12-04 PROCEDURE — 51729 CYSTOMETROGRAM W/VP&UP: CPT

## 2018-12-04 PROCEDURE — 51784 ANAL/URINARY MUSCLE STUDY: CPT

## 2018-12-04 PROCEDURE — 51797 INTRAABDOMINAL PRESSURE TEST: CPT

## 2018-12-04 PROCEDURE — 81002 URINALYSIS NONAUTO W/O SCOPE: CPT

## 2018-12-04 NOTE — PROCEDURES
Patient here for urodynamic testing. Procedure explained and confirmed by patient. See evaluation form for results. Both verbal and written discharge instructions were given.   Patient tolerated procedure well and will follow up with Dr. Luan Leal Fixed    Perineal Sensation:  [x]  Normal  []  Abnormal    Additional Notes:    PROLAPSE (past introitus):  []  Yes  [x]  No  Prolapse reduced for testing?   []  Yes  [x]  No  []  Pessary  []  Speculum  []  Proctoswab  []  Vag Packing    Additional Notes: P Weak detrusor  []  [id number]  Void:   [x]  Typical   []  Atypical    Additional Notes: Patient remained VERY tense thru out study - complained of discomfort, especially with emptying    EMG:  [x]  Reactive []  Non-Reactive    12/4/2018

## 2018-12-04 NOTE — PATIENT INSTRUCTIONS
[facility]  [address] [place] [address],[address]  [place], [address] [facility]  Office: [phone]      Urodynamic Testing Discharge Instructions: There are NO dietary or activity restrictions. You may resume your normal schedule.       You may hav

## 2018-12-17 ENCOUNTER — HOSPITAL ENCOUNTER (EMERGENCY)
Facility: HOSPITAL | Age: 58
Discharge: HOME | End: 2018-12-17
Attending: EMERGENCY MEDICINE
Payer: COMMERCIAL

## 2018-12-17 ENCOUNTER — APPOINTMENT (OUTPATIENT)
Dept: GENERAL RADIOLOGY | Facility: HOSPITAL | Age: 58
End: 2018-12-17
Attending: EMERGENCY MEDICINE
Payer: COMMERCIAL

## 2018-12-17 VITALS
RESPIRATION RATE: 16 BRPM | HEART RATE: 79 BPM | HEIGHT: 61 IN | SYSTOLIC BLOOD PRESSURE: 125 MMHG | OXYGEN SATURATION: 100 % | BODY MASS INDEX: 21.34 KG/M2 | TEMPERATURE: 98 F | DIASTOLIC BLOOD PRESSURE: 88 MMHG | WEIGHT: 113 LBS

## 2018-12-17 DIAGNOSIS — N17.9 ACUTE RENAL FAILURE, UNSPECIFIED ACUTE RENAL FAILURE TYPE (HCC): ICD-10-CM

## 2018-12-17 DIAGNOSIS — J20.9 ACUTE BRONCHITIS, UNSPECIFIED ORGANISM: Primary | ICD-10-CM

## 2018-12-17 PROCEDURE — 93010 ELECTROCARDIOGRAM REPORT: CPT

## 2018-12-17 PROCEDURE — 84484 ASSAY OF TROPONIN QUANT: CPT | Performed by: EMERGENCY MEDICINE

## 2018-12-17 PROCEDURE — 87086 URINE CULTURE/COLONY COUNT: CPT | Performed by: EMERGENCY MEDICINE

## 2018-12-17 PROCEDURE — 99285 EMERGENCY DEPT VISIT HI MDM: CPT

## 2018-12-17 PROCEDURE — 80053 COMPREHEN METABOLIC PANEL: CPT | Performed by: EMERGENCY MEDICINE

## 2018-12-17 PROCEDURE — 96360 HYDRATION IV INFUSION INIT: CPT

## 2018-12-17 PROCEDURE — 94640 AIRWAY INHALATION TREATMENT: CPT

## 2018-12-17 PROCEDURE — 93005 ELECTROCARDIOGRAM TRACING: CPT

## 2018-12-17 PROCEDURE — 85730 THROMBOPLASTIN TIME PARTIAL: CPT | Performed by: EMERGENCY MEDICINE

## 2018-12-17 PROCEDURE — 85610 PROTHROMBIN TIME: CPT | Performed by: EMERGENCY MEDICINE

## 2018-12-17 PROCEDURE — 85378 FIBRIN DEGRADE SEMIQUANT: CPT | Performed by: EMERGENCY MEDICINE

## 2018-12-17 PROCEDURE — 71046 X-RAY EXAM CHEST 2 VIEWS: CPT | Performed by: EMERGENCY MEDICINE

## 2018-12-17 PROCEDURE — 85025 COMPLETE CBC W/AUTO DIFF WBC: CPT | Performed by: EMERGENCY MEDICINE

## 2018-12-17 PROCEDURE — 81001 URINALYSIS AUTO W/SCOPE: CPT | Performed by: EMERGENCY MEDICINE

## 2018-12-17 RX ORDER — AZITHROMYCIN 250 MG/1
250 TABLET, FILM COATED ORAL DAILY
COMMUNITY
End: 2019-02-05

## 2018-12-17 RX ORDER — KETOROLAC TROMETHAMINE 30 MG/ML
30 INJECTION, SOLUTION INTRAMUSCULAR; INTRAVENOUS ONCE
Status: DISCONTINUED | OUTPATIENT
Start: 2018-12-17 | End: 2018-12-17

## 2018-12-17 RX ORDER — IPRATROPIUM BROMIDE AND ALBUTEROL SULFATE 2.5; .5 MG/3ML; MG/3ML
3 SOLUTION RESPIRATORY (INHALATION) ONCE
Status: COMPLETED | OUTPATIENT
Start: 2018-12-17 | End: 2018-12-17

## 2018-12-17 RX ORDER — METHYLPREDNISOLONE 4 MG/1
TABLET ORAL AS DIRECTED
COMMUNITY
End: 2019-02-05

## 2018-12-17 RX ORDER — ALBUTEROL SULFATE 90 UG/1
2 AEROSOL, METERED RESPIRATORY (INHALATION) EVERY 4 HOURS PRN
Qty: 1 INHALER | Refills: 0 | Status: SHIPPED | OUTPATIENT
Start: 2018-12-17 | End: 2019-01-16

## 2018-12-17 RX ORDER — ALBUTEROL SULFATE 2.5 MG/3ML
5 SOLUTION RESPIRATORY (INHALATION) ONCE
Status: COMPLETED | OUTPATIENT
Start: 2018-12-17 | End: 2018-12-17

## 2018-12-18 NOTE — ED NOTES
Patient reports that she saw her primary doctor and was diagnosed with a \"lung infection\" that she was on antibiotics for, last dose for antibiotics is tonight. Patient reports no improvement since being on her course of antibiotics.  Patient stated that

## 2018-12-18 NOTE — ED INITIAL ASSESSMENT (HPI)
Pt went to PCP on Wednesday for shortness of breath and cough x1 month - was told had infection in the lung and put on abx - pt not feeling any improvement and had been feeling chilled w/ worsening shortness of breath,

## 2018-12-18 NOTE — ED PROVIDER NOTES
Patient Seen in: [facility] Emergency Department    History   Patient presents with:  Cough/URI    Stated Complaint: cough, difficulty breathing, on abx for pneumonia    HPI    Patient presents with cough and shortness of breath.   The patient states MD at [facility] ENDOSCOPY   • REMOVAL GALLBLADDER             Social History    Tobacco Use      Smoking status: Never Smoker      Smokeless tobacco: Never Used    Alcohol use: No      Alcohol/week: 0.0 oz    Drug use: No      Review of Systems    Positive for st Neutrophil Absolute Prelim 10.13 (*)     Neutrophil Absolute 10.13 (*)     All other components within normal limits   TROPONIN I - Normal   D-DIMER - Normal    Narrative:     FEU = Fibrinogen Equivalent Units.     D-Dimer results of less than 0.5 ug/mL (FE pulmonary vessels are normal caliber. Mediastinal contours are smooth. Stable chest x-ray. CONCLUSION:  No evidence of active cardiopulmonary disease.      Dictated by: Sharan Douglas MD on 12/17/2018 at 19:50     Approved by: Sharan Douglas MD diagnosis)  Acute renal failure, unspecified acute renal failure type [facility])    Disposition:  Discharge  12/17/2018 10:44 pm    Follow-up:  MD Luna Cruz MD

## 2019-01-02 ENCOUNTER — HOSPITAL ENCOUNTER (OUTPATIENT)
Dept: ULTRASOUND IMAGING | Facility: HOSPITAL | Age: 59
Discharge: HOME | End: 2019-01-02
Attending: INTERNAL MEDICINE
Payer: COMMERCIAL

## 2019-01-02 DIAGNOSIS — N17.9 ACUTE KIDNEY FAILURE (HCC): ICD-10-CM

## 2019-01-02 PROCEDURE — 76775 US EXAM ABDO BACK WALL LIM: CPT | Performed by: INTERNAL MEDICINE

## 2019-01-08 ENCOUNTER — TELEPHONE (OUTPATIENT)
Dept: UROLOGY | Facility: HOSPITAL | Age: 59
End: 2019-01-08

## 2019-01-08 NOTE — TELEPHONE ENCOUNTER
Pt  calling w/ pt for \"test results\". States his wife had a test at our office and is having pain. C/o vaginal burning.  Looking back at notes in epic, looks like pt had kidney US recently ordered by PCP, had UDS done and pt will f/u w/ Dr Jackson Hanna 2/12/

## 2019-01-09 ENCOUNTER — TELEPHONE (OUTPATIENT)
Dept: UROLOGY | Facility: HOSPITAL | Age: 59
End: 2019-01-09

## 2019-01-09 ENCOUNTER — APPOINTMENT (OUTPATIENT)
Dept: LAB | Age: 59
End: 2019-01-09
Attending: OBSTETRICS & GYNECOLOGY
Payer: COMMERCIAL

## 2019-01-09 DIAGNOSIS — R30.0 DYSURIA: Primary | ICD-10-CM

## 2019-01-11 ENCOUNTER — TELEPHONE (OUTPATIENT)
Dept: UROLOGY | Facility: HOSPITAL | Age: 59
End: 2019-01-11

## 2019-01-11 NOTE — TELEPHONE ENCOUNTER
Pt  called and informed of neg urine cx. States his wife is still in a lot of pain. Advised him that pt should make appt w/ Dr Billy Wells or PCP if she was still in pain, but that she did not have a UTI. Pt  verbalized an understanding and agreed.

## 2019-02-05 ENCOUNTER — HOSPITAL ENCOUNTER (EMERGENCY)
Facility: HOSPITAL | Age: 59
Discharge: HOME | End: 2019-02-05
Attending: EMERGENCY MEDICINE
Payer: COMMERCIAL

## 2019-02-05 ENCOUNTER — APPOINTMENT (OUTPATIENT)
Dept: CT IMAGING | Facility: HOSPITAL | Age: 59
End: 2019-02-05
Attending: EMERGENCY MEDICINE
Payer: COMMERCIAL

## 2019-02-05 ENCOUNTER — APPOINTMENT (OUTPATIENT)
Dept: GENERAL RADIOLOGY | Facility: HOSPITAL | Age: 59
End: 2019-02-05
Attending: EMERGENCY MEDICINE
Payer: COMMERCIAL

## 2019-02-05 VITALS
HEIGHT: 61 IN | BODY MASS INDEX: 21.71 KG/M2 | DIASTOLIC BLOOD PRESSURE: 89 MMHG | TEMPERATURE: 98 F | RESPIRATION RATE: 16 BRPM | HEART RATE: 70 BPM | WEIGHT: 115 LBS | SYSTOLIC BLOOD PRESSURE: 132 MMHG | OXYGEN SATURATION: 97 %

## 2019-02-05 DIAGNOSIS — R09.1 PLEURISY: ICD-10-CM

## 2019-02-05 DIAGNOSIS — R05.9 COUGH: ICD-10-CM

## 2019-02-05 DIAGNOSIS — B34.9 VIRAL SYNDROME: Primary | ICD-10-CM

## 2019-02-05 LAB
ALBUMIN SERPL-MCNC: 3.6 G/DL (ref 3.1–4.5)
ALBUMIN/GLOB SERPL: 1 {RATIO} (ref 1–2)
ALP LIVER SERPL-CCNC: 120 U/L (ref 46–118)
ALT SERPL-CCNC: 43 U/L (ref 14–54)
ANION GAP SERPL CALC-SCNC: 6 MMOL/L (ref 0–18)
AST SERPL-CCNC: 30 U/L (ref 15–41)
BASOPHILS # BLD AUTO: 0.03 X10(3) UL (ref 0–0.2)
BASOPHILS NFR BLD AUTO: 0.4 %
BILIRUB SERPL-MCNC: 0.4 MG/DL (ref 0.1–2)
BUN BLD-MCNC: 16 MG/DL (ref 8–20)
BUN/CREAT SERPL: 19.5 (ref 10–20)
CALCIUM BLD-MCNC: 8.8 MG/DL (ref 8.3–10.3)
CHLORIDE SERPL-SCNC: 105 MMOL/L (ref 101–111)
CO2 SERPL-SCNC: 31 MMOL/L (ref 22–32)
CREAT BLD-MCNC: 0.82 MG/DL (ref 0.55–1.02)
DEPRECATED RDW RBC AUTO: 42.4 FL (ref 35.1–46.3)
EOSINOPHIL # BLD AUTO: 0.09 X10(3) UL (ref 0–0.7)
EOSINOPHIL NFR BLD AUTO: 1.3 %
ERYTHROCYTE [DISTWIDTH] IN BLOOD BY AUTOMATED COUNT: 13.6 % (ref 11–15)
GLOBULIN PLAS-MCNC: 3.6 G/DL (ref 2.8–4.4)
GLUCOSE BLD-MCNC: 101 MG/DL (ref 70–99)
HCT VFR BLD AUTO: 41.9 % (ref 35–48)
HGB BLD-MCNC: 14.2 G/DL (ref 12–16)
IMM GRANULOCYTES # BLD AUTO: 0.03 X10(3) UL (ref 0–1)
IMM GRANULOCYTES NFR BLD: 0.4 %
INR BLD: 0.94 (ref 0.9–1.1)
LIPASE: 253 U/L (ref 73–393)
LYMPHOCYTES # BLD AUTO: 2.25 X10(3) UL (ref 1–4)
LYMPHOCYTES NFR BLD AUTO: 31.8 %
M PROTEIN MFR SERPL ELPH: 7.2 G/DL (ref 6.4–8.2)
MCH RBC QN AUTO: 29.1 PG (ref 26–34)
MCHC RBC AUTO-ENTMCNC: 33.9 G/DL (ref 31–37)
MCV RBC AUTO: 85.9 FL (ref 80–100)
MONOCYTES # BLD AUTO: 0.54 X10(3) UL (ref 0.1–1)
MONOCYTES NFR BLD AUTO: 7.6 %
NEUTROPHILS # BLD AUTO: 4.14 X10 (3) UL (ref 1.5–7.7)
NEUTROPHILS # BLD AUTO: 4.14 X10(3) UL (ref 1.5–7.7)
NEUTROPHILS NFR BLD AUTO: 58.5 %
OSMOLALITY SERPL CALC.SUM OF ELEC: 295 MOSM/KG (ref 275–295)
PLATELET # BLD AUTO: 259 10(3)UL (ref 150–450)
POTASSIUM SERPL-SCNC: 3.5 MMOL/L (ref 3.6–5.1)
PRO-BETA NATRIURETIC PEPTIDE: 85 PG/ML (ref ?–125)
PSA SERPL DL<=0.01 NG/ML-MCNC: 13 SECONDS (ref 12.4–14.7)
RBC # BLD AUTO: 4.88 X10(6)UL (ref 3.8–5.3)
SODIUM SERPL-SCNC: 142 MMOL/L (ref 136–144)
TROPONIN I SERPL-MCNC: <0.046 NG/ML (ref ?–0.05)
TROPONIN I SERPL-MCNC: <0.046 NG/ML (ref ?–0.05)
WBC # BLD AUTO: 7.1 X10(3) UL (ref 4–11)

## 2019-02-05 PROCEDURE — 93010 ELECTROCARDIOGRAM REPORT: CPT

## 2019-02-05 PROCEDURE — 84484 ASSAY OF TROPONIN QUANT: CPT | Performed by: EMERGENCY MEDICINE

## 2019-02-05 PROCEDURE — 71045 X-RAY EXAM CHEST 1 VIEW: CPT | Performed by: EMERGENCY MEDICINE

## 2019-02-05 PROCEDURE — 85610 PROTHROMBIN TIME: CPT | Performed by: EMERGENCY MEDICINE

## 2019-02-05 PROCEDURE — 99285 EMERGENCY DEPT VISIT HI MDM: CPT

## 2019-02-05 PROCEDURE — 80053 COMPREHEN METABOLIC PANEL: CPT | Performed by: EMERGENCY MEDICINE

## 2019-02-05 PROCEDURE — 83690 ASSAY OF LIPASE: CPT | Performed by: EMERGENCY MEDICINE

## 2019-02-05 PROCEDURE — 71275 CT ANGIOGRAPHY CHEST: CPT | Performed by: EMERGENCY MEDICINE

## 2019-02-05 PROCEDURE — 83880 ASSAY OF NATRIURETIC PEPTIDE: CPT | Performed by: EMERGENCY MEDICINE

## 2019-02-05 PROCEDURE — 96374 THER/PROPH/DIAG INJ IV PUSH: CPT

## 2019-02-05 PROCEDURE — 93005 ELECTROCARDIOGRAM TRACING: CPT

## 2019-02-05 PROCEDURE — 85025 COMPLETE CBC W/AUTO DIFF WBC: CPT | Performed by: EMERGENCY MEDICINE

## 2019-02-05 RX ORDER — KETOROLAC TROMETHAMINE 30 MG/ML
15 INJECTION, SOLUTION INTRAMUSCULAR; INTRAVENOUS ONCE
Status: COMPLETED | OUTPATIENT
Start: 2019-02-05 | End: 2019-02-05

## 2019-02-05 RX ORDER — ESCITALOPRAM OXALATE 10 MG/1
10 TABLET ORAL DAILY
COMMUNITY
End: 2020-01-22

## 2019-02-05 NOTE — ED INITIAL ASSESSMENT (HPI)
C/o shortness of breath with midsternal pain that radiates to her back. Reports feeling lightheaded.

## 2019-02-06 LAB
ATRIAL RATE: 73 BPM
P AXIS: 68 DEGREES
P-R INTERVAL: 158 MS
Q-T INTERVAL: 398 MS
QRS DURATION: 76 MS
QTC CALCULATION (BEZET): 438 MS
R AXIS: 66 DEGREES
T AXIS: 62 DEGREES
VENTRICULAR RATE: 73 BPM

## 2019-02-06 NOTE — ED PROVIDER NOTES
Patient Seen in: [facility] Emergency Department    History   Patient presents with:  Dyspnea LEE SOB (respiratory)    Stated Complaint: lee    HPI    55-year-old female presents emergency room with chief complaint of chest pain and cough.   Patient CHOLECYSTECTOMY  2008   • COLONOSCOPY Left 7/27/2018    Performed by Hua Vance MD at [facility] ENDOSCOPY   • ESOPHAGOGASTRODUODENOSCOPY (EGD) N/A 7/27/2018    Performed by Brooklyn Henderson MD at [address] [place] [address] NATRIURETIC PEPTIDE - Normal   PROTHROMBIN TIME (PT) - Normal   LIPASE - Normal   TROPONIN I - Normal   CBC WITH DIFFERENTIAL WITH PLATELET    Narrative: The following orders were created for panel order CBC WITH DIFFERENTIAL WITH PLATELET.   Procedure for further evaluation, treatment, or admission on an emergency basis. Comprehensive verbal and written discharge and follow-up instructions were provided to help prevent relapse or worsening.   Patient was instructed to follow-up with primary care provide

## 2019-02-10 ENCOUNTER — APPOINTMENT (OUTPATIENT)
Dept: GENERAL RADIOLOGY | Facility: HOSPITAL | Age: 59
End: 2019-02-10
Payer: COMMERCIAL

## 2019-02-10 ENCOUNTER — HOSPITAL ENCOUNTER (EMERGENCY)
Facility: HOSPITAL | Age: 59
Discharge: HOME | End: 2019-02-10
Payer: COMMERCIAL

## 2019-02-10 VITALS
HEART RATE: 67 BPM | HEIGHT: 61 IN | TEMPERATURE: 97 F | DIASTOLIC BLOOD PRESSURE: 75 MMHG | OXYGEN SATURATION: 98 % | BODY MASS INDEX: 21.71 KG/M2 | SYSTOLIC BLOOD PRESSURE: 117 MMHG | WEIGHT: 115 LBS | RESPIRATION RATE: 16 BRPM

## 2019-02-10 DIAGNOSIS — R07.89 CHEST PAIN, ATYPICAL: Primary | ICD-10-CM

## 2019-02-10 LAB
ALBUMIN SERPL-MCNC: 3.6 G/DL (ref 3.1–4.5)
ALBUMIN/GLOB SERPL: 1.1 {RATIO} (ref 1–2)
ALP LIVER SERPL-CCNC: 108 U/L (ref 46–118)
ALT SERPL-CCNC: 31 U/L (ref 14–54)
ANION GAP SERPL CALC-SCNC: 7 MMOL/L (ref 0–18)
AST SERPL-CCNC: 21 U/L (ref 15–41)
BASOPHILS # BLD AUTO: 0.04 X10(3) UL (ref 0–0.2)
BASOPHILS NFR BLD AUTO: 0.5 %
BILIRUB SERPL-MCNC: 0.3 MG/DL (ref 0.1–2)
BUN BLD-MCNC: 18 MG/DL (ref 8–20)
BUN/CREAT SERPL: 21.4 (ref 10–20)
CALCIUM BLD-MCNC: 8.6 MG/DL (ref 8.3–10.3)
CHLORIDE SERPL-SCNC: 106 MMOL/L (ref 101–111)
CO2 SERPL-SCNC: 30 MMOL/L (ref 22–32)
CREAT BLD-MCNC: 0.84 MG/DL (ref 0.55–1.02)
D-DIMER: 0.31 UG/ML FEU (ref 0–0.49)
DEPRECATED RDW RBC AUTO: 43.2 FL (ref 35.1–46.3)
EOSINOPHIL # BLD AUTO: 0.2 X10(3) UL (ref 0–0.7)
EOSINOPHIL NFR BLD AUTO: 2.6 %
ERYTHROCYTE [DISTWIDTH] IN BLOOD BY AUTOMATED COUNT: 13.2 % (ref 11–15)
GLOBULIN PLAS-MCNC: 3.3 G/DL (ref 2.8–4.4)
GLUCOSE BLD-MCNC: 100 MG/DL (ref 70–99)
HCT VFR BLD AUTO: 38.7 % (ref 35–48)
HGB BLD-MCNC: 12.6 G/DL (ref 12–16)
IMM GRANULOCYTES # BLD AUTO: 0.01 X10(3) UL (ref 0–1)
IMM GRANULOCYTES NFR BLD: 0.1 %
LIPASE: 285 U/L (ref 73–393)
LYMPHOCYTES # BLD AUTO: 2.79 X10(3) UL (ref 1–4)
LYMPHOCYTES NFR BLD AUTO: 36 %
M PROTEIN MFR SERPL ELPH: 6.9 G/DL (ref 6.4–8.2)
MCH RBC QN AUTO: 28.8 PG (ref 26–34)
MCHC RBC AUTO-ENTMCNC: 32.6 G/DL (ref 31–37)
MCV RBC AUTO: 88.4 FL (ref 80–100)
MONOCYTES # BLD AUTO: 0.58 X10(3) UL (ref 0.1–1)
MONOCYTES NFR BLD AUTO: 7.5 %
NEUTROPHILS # BLD AUTO: 4.14 X10 (3) UL (ref 1.5–7.7)
NEUTROPHILS # BLD AUTO: 4.14 X10(3) UL (ref 1.5–7.7)
NEUTROPHILS NFR BLD AUTO: 53.3 %
OSMOLALITY SERPL CALC.SUM OF ELEC: 298 MOSM/KG (ref 275–295)
PLATELET # BLD AUTO: 240 10(3)UL (ref 150–450)
POTASSIUM SERPL-SCNC: 3.4 MMOL/L (ref 3.6–5.1)
RBC # BLD AUTO: 4.38 X10(6)UL (ref 3.8–5.3)
SODIUM SERPL-SCNC: 143 MMOL/L (ref 136–144)
TROPONIN I SERPL-MCNC: <0.046 NG/ML (ref ?–0.05)
TROPONIN I SERPL-MCNC: <0.046 NG/ML (ref ?–0.05)
WBC # BLD AUTO: 7.8 X10(3) UL (ref 4–11)

## 2019-02-10 PROCEDURE — 80053 COMPREHEN METABOLIC PANEL: CPT

## 2019-02-10 PROCEDURE — 84484 ASSAY OF TROPONIN QUANT: CPT

## 2019-02-10 PROCEDURE — 85378 FIBRIN DEGRADE SEMIQUANT: CPT

## 2019-02-10 PROCEDURE — 96374 THER/PROPH/DIAG INJ IV PUSH: CPT

## 2019-02-10 PROCEDURE — S0028 INJECTION, FAMOTIDINE, 20 MG: HCPCS

## 2019-02-10 PROCEDURE — 99285 EMERGENCY DEPT VISIT HI MDM: CPT

## 2019-02-10 PROCEDURE — 93010 ELECTROCARDIOGRAM REPORT: CPT

## 2019-02-10 PROCEDURE — 93005 ELECTROCARDIOGRAM TRACING: CPT

## 2019-02-10 PROCEDURE — 71045 X-RAY EXAM CHEST 1 VIEW: CPT

## 2019-02-10 PROCEDURE — 85025 COMPLETE CBC W/AUTO DIFF WBC: CPT

## 2019-02-10 PROCEDURE — 83690 ASSAY OF LIPASE: CPT

## 2019-02-10 RX ORDER — ASPIRIN 81 MG/1
324 TABLET, CHEWABLE ORAL ONCE
Status: COMPLETED | OUTPATIENT
Start: 2019-02-10 | End: 2019-02-10

## 2019-02-10 RX ORDER — MAGNESIUM HYDROXIDE/ALUMINUM HYDROXICE/SIMETHICONE 120; 1200; 1200 MG/30ML; MG/30ML; MG/30ML
30 SUSPENSION ORAL ONCE
Status: COMPLETED | OUTPATIENT
Start: 2019-02-10 | End: 2019-02-10

## 2019-02-10 RX ORDER — FAMOTIDINE 10 MG/ML
20 INJECTION, SOLUTION INTRAVENOUS ONCE
Status: DISCONTINUED | OUTPATIENT
Start: 2019-02-10 | End: 2019-02-10

## 2019-02-10 RX ORDER — FAMOTIDINE 20 MG/1
20 TABLET ORAL 2 TIMES DAILY
Qty: 10 TABLET | Refills: 0 | Status: SHIPPED | OUTPATIENT
Start: 2019-02-10 | End: 2019-02-15

## 2019-02-10 RX ORDER — FAMOTIDINE 10 MG/ML
20 INJECTION, SOLUTION INTRAVENOUS ONCE
Status: COMPLETED | OUTPATIENT
Start: 2019-02-10 | End: 2019-02-10

## 2019-02-10 NOTE — ED INITIAL ASSESSMENT (HPI)
Pt c/o sternal chest pain that radiates into left arm - reports pain has been happening intermittently x1 week, but is more frequent at this time. Reports here last week for shortness of breath that occurred with the pain.      Denies cardiac hx

## 2019-02-10 NOTE — ED PROVIDER NOTES
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 Patient's been comfortable here since GI cocktail.   Symptoms are not suggestive of unstable angina or coronary artery disease though given her age I do suggest that she bob

## 2019-02-11 LAB
ATRIAL RATE: 71 BPM
ATRIAL RATE: 81 BPM
P AXIS: 70 DEGREES
P AXIS: 71 DEGREES
P-R INTERVAL: 156 MS
P-R INTERVAL: 168 MS
Q-T INTERVAL: 398 MS
Q-T INTERVAL: 414 MS
QRS DURATION: 80 MS
QRS DURATION: 82 MS
QTC CALCULATION (BEZET): 449 MS
QTC CALCULATION (BEZET): 462 MS
R AXIS: 66 DEGREES
R AXIS: 67 DEGREES
T AXIS: 65 DEGREES
T AXIS: 67 DEGREES
VENTRICULAR RATE: 71 BPM
VENTRICULAR RATE: 81 BPM

## 2019-02-12 ENCOUNTER — OFFICE VISIT (OUTPATIENT)
Dept: UROLOGY | Facility: HOSPITAL | Age: 59
End: 2019-02-12
Attending: OBSTETRICS & GYNECOLOGY
Payer: COMMERCIAL

## 2019-02-12 DIAGNOSIS — N95.2 POSTMENOPAUSAL ATROPHIC VAGINITIS: ICD-10-CM

## 2019-02-12 DIAGNOSIS — R31.29 HEMATURIA, MICROSCOPIC: ICD-10-CM

## 2019-02-12 DIAGNOSIS — R30.0 DYSURIA: ICD-10-CM

## 2019-02-12 DIAGNOSIS — R10.2 SUPRAPUBIC PRESSURE: Primary | ICD-10-CM

## 2019-02-12 DIAGNOSIS — N81.84 PELVIC MUSCLE WASTING: ICD-10-CM

## 2019-02-12 PROCEDURE — 87086 URINE CULTURE/COLONY COUNT: CPT | Performed by: OBSTETRICS & GYNECOLOGY

## 2019-02-12 PROCEDURE — 88108 CYTOPATH CONCENTRATE TECH: CPT | Performed by: OBSTETRICS & GYNECOLOGY

## 2019-02-12 PROCEDURE — 52000 CYSTOURETHROSCOPY: CPT

## 2019-02-12 PROCEDURE — 81002 URINALYSIS NONAUTO W/O SCOPE: CPT

## 2019-02-12 NOTE — PROGRESS NOTES
Nasty Gal, GasperAskem. at  [address] [facility] [place] [address] [facility]  [address]  [address] [place] [address]: [phone]  FAX: [phone]     Date Patient MRN   2/12/2019 Chrissy Mason 50 60 daily. Disp: 30 tablet Rfl: 0   ESTRACE 0.1 MG/GM Vaginal Cream APPLY AS DIRECTED DAILY VAGINALLY FOR 30 DAYS Disp:  Rfl: 3   lansoprazole (PREVACID) 30 MG Oral Capsule Delayed Release Take 30 mg by mouth 2 (two) times daily.  Disp:  Rfl:      No facility-a blood vessels), mesh erosion, dyspareunia, de ezio UUI, worsening ALISSA, recurrence, voiding dysfunction, and pain.   Discussed pelvic floor PT  Discussed dietary and behavioral modifications for mgmt of urinary symptoms  Discussed weight management and benef

## 2019-02-13 VITALS
SYSTOLIC BLOOD PRESSURE: 118 MMHG | HEIGHT: 61 IN | DIASTOLIC BLOOD PRESSURE: 68 MMHG | BODY MASS INDEX: 21.71 KG/M2 | WEIGHT: 115 LBS

## 2019-02-13 LAB
CONTROL RUN WITHIN 24 HOURS?: YES
NITRITE URINE: NEGATIVE

## 2019-02-15 LAB — NON GYNE INTERPRETATION: NEGATIVE

## 2019-02-19 ENCOUNTER — TELEPHONE (OUTPATIENT)
Dept: UROLOGY | Facility: HOSPITAL | Age: 59
End: 2019-02-19

## 2019-02-19 NOTE — TELEPHONE ENCOUNTER
Pt  Cathleen Catregina calling. We have spoken 3 x since 2/18/19. He is asking about a referral for PT.  After speaking to Marcellus Mendez our WILIAN, she called the [employer] fund and pt doesn't need a referral. Pt  called back again this afternoon, still having problems m

## 2019-03-06 ENCOUNTER — HOSPITAL ENCOUNTER (OUTPATIENT)
Dept: PHYSICAL THERAPY | Facility: HOSPITAL | Age: 59
Setting detail: THERAPIES SERIES
Discharge: HOME | End: 2019-03-06
Attending: OBSTETRICS & GYNECOLOGY
Payer: COMMERCIAL

## 2019-03-06 DIAGNOSIS — N81.84 PELVIC MUSCLE WASTING: ICD-10-CM

## 2019-03-06 DIAGNOSIS — R10.2 SUPRAPUBIC PRESSURE: ICD-10-CM

## 2019-03-06 PROCEDURE — 97110 THERAPEUTIC EXERCISES: CPT

## 2019-03-06 PROCEDURE — 97140 MANUAL THERAPY 1/> REGIONS: CPT

## 2019-03-06 PROCEDURE — 97161 PT EVAL LOW COMPLEX 20 MIN: CPT

## 2019-03-06 NOTE — PROGRESS NOTES
MUSCULOSKELETAL AND PELVIC FLOOR EVALUATION:   Referring Physician: Dr. Tatum Martínez  Diagnosis: Suprapubic pressure, pelvic muscle wasting     Date of Service: 3/6/2019     PATIENT SUMMARY   Dez Yanez is a 63year old y/o female who presents to therapy to Alignment: symmetrical     External Palpation: no tenderness lumbar spine/paraspinals today  Abdominal Wall Integrity: tenderness suprapubic, right and left lower  ROM Summary: grossly WNL (B) LE and lumbar spine  Flexibility Summary: WNL   Strength Summar voiding </=1x/night for improved sleep. 4. Social activity not limited by UI or pain.   5. Decreased pelvic floor muscle tightness to allow pt to resume sexual activity with </2/10/pain     PFDI: 8.3/300, 2.8% Impairment     Frequency / Duration: Patient w

## 2019-03-13 ENCOUNTER — HOSPITAL ENCOUNTER (OUTPATIENT)
Dept: PHYSICAL THERAPY | Facility: HOSPITAL | Age: 59
Setting detail: THERAPIES SERIES
Discharge: HOME | End: 2019-03-13
Attending: OBSTETRICS & GYNECOLOGY
Payer: COMMERCIAL

## 2019-03-13 PROCEDURE — 97140 MANUAL THERAPY 1/> REGIONS: CPT

## 2019-03-13 PROCEDURE — 97110 THERAPEUTIC EXERCISES: CPT

## 2019-03-13 PROCEDURE — 97112 NEUROMUSCULAR REEDUCATION: CPT

## 2019-03-13 NOTE — PROGRESS NOTES
Dx: Suprapubic pressure, pelvic muscle wasting           Authorized # of Visits:  [employer]/medicaid, no c/p, no limit         Next MD visit: none scheduled  Fall Risk: standard         Precautions: n/a             Subjective: Pt didn't  have intercourse this

## 2019-03-20 ENCOUNTER — APPOINTMENT (OUTPATIENT)
Dept: PHYSICAL THERAPY | Facility: HOSPITAL | Age: 59
End: 2019-03-20
Attending: OBSTETRICS & GYNECOLOGY
Payer: COMMERCIAL

## 2019-03-27 ENCOUNTER — HOSPITAL ENCOUNTER (OUTPATIENT)
Dept: PHYSICAL THERAPY | Facility: HOSPITAL | Age: 59
Setting detail: THERAPIES SERIES
Discharge: HOME | End: 2019-03-27
Attending: OBSTETRICS & GYNECOLOGY
Payer: COMMERCIAL

## 2019-03-27 PROCEDURE — 97110 THERAPEUTIC EXERCISES: CPT

## 2019-03-27 PROCEDURE — 97112 NEUROMUSCULAR REEDUCATION: CPT

## 2019-03-27 NOTE — PROGRESS NOTES
Dx: Suprapubic pressure, pelvic muscle wasting           Authorized # of Visits:  [employer]/medicaid, no c/p, no limit         Next MD visit: none scheduled  Fall Risk: standard         Precautions: n/a             Subjective: Having some back pain today-think - NuStep 5min L5         - HS, pirifomis stretch 30 sec R/L x1 ea        - LTR x20        - Abdominal bracing + U/LE lift x20         4 pt cat/camel x20    Kegel +  iso hip add  10\" x10     Kegel +  Resisted   ER RTB  x10    Kegel +  U/LE lift  x10

## 2019-04-10 ENCOUNTER — HOSPITAL ENCOUNTER (OUTPATIENT)
Dept: PHYSICAL THERAPY | Facility: HOSPITAL | Age: 59
Setting detail: THERAPIES SERIES
Discharge: HOME | End: 2019-04-10
Attending: OBSTETRICS & GYNECOLOGY
Payer: COMMERCIAL

## 2019-04-10 PROCEDURE — 97110 THERAPEUTIC EXERCISES: CPT

## 2019-04-10 PROCEDURE — 97140 MANUAL THERAPY 1/> REGIONS: CPT

## 2019-04-10 NOTE — PROGRESS NOTES
Dx: Suprapubic pressure, pelvic muscle wasting           Authorized # of Visits:  [employer]/medicaid, no c/p, no limit         Next MD visit: none scheduled  Fall Risk: standard         Precautions: n/a             Subjective: Has some stomach pain, back pain Colon massage/  \"ILU\" massage    abdominal releases-3 directional manual release    Lumbar paraspinalsoft tissue massage                               NuStep 5min L5          \"ILU\" massage  Abdominal myofascial release   Issued handout Happy baby stret

## 2019-04-17 ENCOUNTER — HOSPITAL ENCOUNTER (OUTPATIENT)
Dept: PHYSICAL THERAPY | Facility: HOSPITAL | Age: 59
Setting detail: THERAPIES SERIES
Discharge: HOME | End: 2019-04-17
Attending: OBSTETRICS & GYNECOLOGY
Payer: COMMERCIAL

## 2019-04-17 PROCEDURE — 97110 THERAPEUTIC EXERCISES: CPT

## 2019-04-17 PROCEDURE — 97140 MANUAL THERAPY 1/> REGIONS: CPT

## 2019-04-17 NOTE — PROGRESS NOTES
Dx: Suprapubic pressure, pelvic muscle wasting           Authorized # of Visits:  [employer]/medicaid, no c/p, no limit         Next MD visit: none scheduled  Fall Risk: standard         Precautions: n/a             Subjective: Feeling a lot better since last v moisturizer, lubricants with intercourse       Internal pelvic floor muscle stretching, functional manual release -5 min for assessment    NM Re-ed-  Discussed accessory muscle fascial attachments with pelvic floor muscles     Strategies for intercourse-regis

## 2019-04-24 ENCOUNTER — HOSPITAL ENCOUNTER (OUTPATIENT)
Dept: PHYSICAL THERAPY | Facility: HOSPITAL | Age: 59
Setting detail: THERAPIES SERIES
Discharge: HOME | End: 2019-04-24
Attending: INTERNAL MEDICINE
Payer: COMMERCIAL

## 2019-04-24 PROCEDURE — 97110 THERAPEUTIC EXERCISES: CPT

## 2019-04-24 PROCEDURE — 97140 MANUAL THERAPY 1/> REGIONS: CPT

## 2019-04-24 NOTE — PROGRESS NOTES
Dx: Suprapubic pressure, pelvic muscle wasting           Authorized # of Visits:  [employer]/medicaid, no c/p, no limit         Next MD visit: none scheduled  Fall Risk: standard         Precautions: n/a             Subjective:Continuing to feel a lot better . Oneal Claude tone  kegel's  Tonic  Phasic  Archer  with coordinated breathing     skincare  -vaginally (estrace-MD prescribed and therapist encouraged use), vaginal moisturizer, lubricants with intercourse       Internal pelvic floor muscle stretching, functional manua R/L    KT-lateral L lower abdominal quadrant-3 pieces         Charges: MTx1 (15 min), TEx2(30 min)  Total Timed Treatment: 45 min     Total Treatment Time: 45 min

## 2019-04-24 NOTE — PROGRESS NOTES
[address] [facility]  Obstetrics and Gynecology  History & Physical    CC: Patient is a new patient and here for a well woman exam     Subjective:     HPI: Magy Phillips is a 63year old [id number] female here for a well women exam. Patient reports hx of LLQ ab erythromycin 5 MG/GM Ophthalmic Ointment  Disp:  Rfl:    LOTEMAX 0.5 % Ophthalmic Gel  Disp:  Rfl:    Calcium Carbonate-Vitamin D 500-200 MG-UNIT Oral Tab Take 1 tablet by mouth daily.  Disp:  Rfl:    vitamin E 100 UNITS Oral Cap Take 400 Units by mouth d coronary CTA 1/2014   • Unspecified essential hypertension    • Visual impairment     glasses   • Wears glasses    • Weight loss        Immunization History: There is no immunization history on file for this patient.     PSH:  Past Surgical History:   P file        Physically abused: Not on file        Forced sexual activity: Not on file    Other Topics      Concerns:         Service: Not Asked        Blood Transfusions: Not Asked        Caffeine Concern: No        Occupational Exposure: Not Asked index is 21.95 kg/m². General: AAO.NAD.   CVS exam: RRR   Chest: CTAB   Breast: symmetric, no dominant or suspicious mass, no skin or nipple changes and no axillary adenopathy.  Mild tender along left lower inner quadrant of breast without visible or pal questions were answered to the best of my ability at this time.       RTC in 1 year for a well woman exam or sooner if needed     Beau Capone MD   [facility] - OBGYN

## 2019-04-25 ENCOUNTER — OFFICE VISIT (OUTPATIENT)
Dept: OBGYN CLINIC | Facility: CLINIC | Age: 59
End: 2019-04-25
Payer: COMMERCIAL

## 2019-04-25 VITALS
WEIGHT: 120 LBS | DIASTOLIC BLOOD PRESSURE: 78 MMHG | BODY MASS INDEX: 22.08 KG/M2 | SYSTOLIC BLOOD PRESSURE: 114 MMHG | HEIGHT: 62 IN

## 2019-04-25 DIAGNOSIS — N94.10 FEMALE DYSPAREUNIA: ICD-10-CM

## 2019-04-25 DIAGNOSIS — Z12.4 CERVICAL CANCER SCREENING: ICD-10-CM

## 2019-04-25 DIAGNOSIS — R10.32 LLQ ABDOMINAL PAIN: ICD-10-CM

## 2019-04-25 DIAGNOSIS — N89.8 VAGINAL ITCHING: ICD-10-CM

## 2019-04-25 DIAGNOSIS — Z01.419 WELL WOMAN EXAM WITH ROUTINE GYNECOLOGICAL EXAM: Primary | ICD-10-CM

## 2019-04-25 PROCEDURE — 99396 PREV VISIT EST AGE 40-64: CPT | Performed by: OBSTETRICS & GYNECOLOGY

## 2019-04-25 PROCEDURE — 87660 TRICHOMONAS VAGIN DIR PROBE: CPT | Performed by: OBSTETRICS & GYNECOLOGY

## 2019-04-25 PROCEDURE — 88175 CYTOPATH C/V AUTO FLUID REDO: CPT | Performed by: OBSTETRICS & GYNECOLOGY

## 2019-04-25 PROCEDURE — 87510 GARDNER VAG DNA DIR PROBE: CPT | Performed by: OBSTETRICS & GYNECOLOGY

## 2019-04-25 PROCEDURE — 87480 CANDIDA DNA DIR PROBE: CPT | Performed by: OBSTETRICS & GYNECOLOGY

## 2019-04-25 PROCEDURE — 87591 N.GONORRHOEAE DNA AMP PROB: CPT | Performed by: OBSTETRICS & GYNECOLOGY

## 2019-04-25 PROCEDURE — 87491 CHLMYD TRACH DNA AMP PROBE: CPT | Performed by: OBSTETRICS & GYNECOLOGY

## 2019-04-25 PROCEDURE — 87624 HPV HI-RISK TYP POOLED RSLT: CPT | Performed by: OBSTETRICS & GYNECOLOGY

## 2019-04-29 NOTE — PROGRESS NOTES
Israeli speaking  Please inform patient that vaginitis panel and GC/Chl screening negative  Follow up as directed

## 2019-04-30 NOTE — PROGRESS NOTES
Patient's  informed of results. Verbalized understanding. No further questions or concerns. He is on FYI.

## 2019-05-01 ENCOUNTER — HOSPITAL ENCOUNTER (OUTPATIENT)
Dept: PHYSICAL THERAPY | Facility: HOSPITAL | Age: 59
Setting detail: THERAPIES SERIES
Discharge: HOME | End: 2019-05-01
Attending: OBSTETRICS & GYNECOLOGY
Payer: COMMERCIAL

## 2019-05-01 PROCEDURE — 97140 MANUAL THERAPY 1/> REGIONS: CPT

## 2019-05-01 PROCEDURE — 97110 THERAPEUTIC EXERCISES: CPT

## 2019-05-01 NOTE — PROGRESS NOTES
Discharge Summary    Pt has attended 7 visits in Physical Therapy.      Dx: Suprapubic pressure, pelvic muscle wasting             Subjective: No c/o pelvic/vaginal or abdominal pain, except when she pushes hard into L side of lower abdomen, still has some minimal pain left lower abdominal quadrant that resolves with manual therapy  ROM Summary: grossly WNL (B) LE and lumbar spine  Flexibility Summary: WNL   Strength Summary: lower abdominals 2A (was:1B), trunk ext 5/5, 5/5 EMY LE except below:     External findings, precautions, and treatment options and has agreed to actively participate in planning and for this course of care. Thank you for your referral. If you have any questions, please contact me at Dept: [phone].     Sincerely,  Electronically s R/L x1 ea HS, pirifomis stretch 30 sec R/L x1 ea -  Reviewed HEP including how to put on kinesiotape    - LTR x20 x20      - Abdominal bracing + U/LE lift x20 - FW lunge BOSU + UE rotation R/L x15  w abdominal bracing   4 pt U/LE lift x10-HEP  MC, VC    4

## 2019-06-17 ENCOUNTER — HOSPITAL ENCOUNTER (OUTPATIENT)
Dept: ULTRASOUND IMAGING | Age: 59
Discharge: HOME | End: 2019-06-17
Attending: INTERNAL MEDICINE
Payer: COMMERCIAL

## 2019-06-17 ENCOUNTER — HOSPITAL ENCOUNTER (OUTPATIENT)
Dept: MAMMOGRAPHY | Age: 59
Discharge: HOME | End: 2019-06-17
Attending: INTERNAL MEDICINE
Payer: COMMERCIAL

## 2019-06-17 ENCOUNTER — HOSPITAL ENCOUNTER (OUTPATIENT)
Dept: BONE DENSITY | Age: 59
Discharge: HOME | End: 2019-06-17
Attending: INTERNAL MEDICINE
Payer: COMMERCIAL

## 2019-06-17 DIAGNOSIS — R92.8 ABNORMAL MAMMOGRAM: ICD-10-CM

## 2019-06-17 DIAGNOSIS — M81.0 OSTEOPOROSIS: ICD-10-CM

## 2019-06-17 PROCEDURE — 76642 ULTRASOUND BREAST LIMITED: CPT | Performed by: INTERNAL MEDICINE

## 2019-06-17 PROCEDURE — 77062 BREAST TOMOSYNTHESIS BI: CPT | Performed by: INTERNAL MEDICINE

## 2019-06-17 PROCEDURE — 77066 DX MAMMO INCL CAD BI: CPT | Performed by: INTERNAL MEDICINE

## 2019-06-17 PROCEDURE — 77080 DXA BONE DENSITY AXIAL: CPT | Performed by: INTERNAL MEDICINE

## 2019-07-02 ENCOUNTER — HOSPITAL ENCOUNTER (OUTPATIENT)
Dept: ULTRASOUND IMAGING | Facility: HOSPITAL | Age: 59
Discharge: HOME | End: 2019-07-02
Attending: OBSTETRICS & GYNECOLOGY
Payer: COMMERCIAL

## 2019-07-02 DIAGNOSIS — R10.32 LLQ ABDOMINAL PAIN: ICD-10-CM

## 2019-07-02 PROCEDURE — 76830 TRANSVAGINAL US NON-OB: CPT | Performed by: OBSTETRICS & GYNECOLOGY

## 2019-07-02 PROCEDURE — 76856 US EXAM PELVIC COMPLETE: CPT | Performed by: OBSTETRICS & GYNECOLOGY

## 2019-07-02 PROCEDURE — 93975 VASCULAR STUDY: CPT | Performed by: OBSTETRICS & GYNECOLOGY

## 2019-07-05 NOTE — PROGRESS NOTES
Congolese speaking    Please inform patient that pelvic US reviewed and overall reassuring  No abnormal findings with uterus  No abnormal adnexal findings   No evidence of gynecological etiology for LLQ abdominal pain     May follow up with PCP if persistent

## 2019-07-09 ENCOUNTER — HOSPITAL ENCOUNTER (OUTPATIENT)
Dept: CT IMAGING | Facility: HOSPITAL | Age: 59
Discharge: HOME | End: 2019-07-09
Attending: INTERNAL MEDICINE
Payer: COMMERCIAL

## 2019-07-09 ENCOUNTER — OFFICE VISIT (OUTPATIENT)
Dept: UROLOGY | Facility: HOSPITAL | Age: 59
End: 2019-07-09
Attending: OBSTETRICS & GYNECOLOGY
Payer: COMMERCIAL

## 2019-07-09 VITALS
HEIGHT: 62 IN | WEIGHT: 120 LBS | SYSTOLIC BLOOD PRESSURE: 112 MMHG | DIASTOLIC BLOOD PRESSURE: 62 MMHG | BODY MASS INDEX: 22.08 KG/M2

## 2019-07-09 DIAGNOSIS — N81.84 PELVIC MUSCLE WASTING: ICD-10-CM

## 2019-07-09 DIAGNOSIS — R10.2 SUPRAPUBIC PRESSURE: Primary | ICD-10-CM

## 2019-07-09 DIAGNOSIS — N95.2 POSTMENOPAUSAL ATROPHIC VAGINITIS: ICD-10-CM

## 2019-07-09 DIAGNOSIS — N94.10 DYSPAREUNIA, FEMALE: ICD-10-CM

## 2019-07-09 DIAGNOSIS — R52 ACUTE PAIN: ICD-10-CM

## 2019-07-09 LAB — CREAT BLD-MCNC: 0.6 MG/DL (ref 0.55–1.02)

## 2019-07-09 PROCEDURE — 87077 CULTURE AEROBIC IDENTIFY: CPT | Performed by: OBSTETRICS & GYNECOLOGY

## 2019-07-09 PROCEDURE — 82565 ASSAY OF CREATININE: CPT

## 2019-07-09 PROCEDURE — 74177 CT ABD & PELVIS W/CONTRAST: CPT | Performed by: INTERNAL MEDICINE

## 2019-07-09 PROCEDURE — 87086 URINE CULTURE/COLONY COUNT: CPT | Performed by: OBSTETRICS & GYNECOLOGY

## 2019-07-09 PROCEDURE — 99211 OFF/OP EST MAY X REQ PHY/QHP: CPT

## 2019-07-09 RX ORDER — ESTRADIOL 0.1 MG/G
CREAM VAGINAL
Qty: 1 TUBE | Refills: 2 | Status: SHIPPED | OUTPATIENT
Start: 2019-07-09

## 2019-07-09 NOTE — PROGRESS NOTES
Patient presents to follow up pelvic pain    She is currently using pelvic floor PT    She reports +improvement, 80%  She c/o sx of UTI    UDS wnl  Cysto wnl  Pelvic u/s wnl    Vag estrogen cream, needs refill    C/o s/sx of UTI - suprapubic pressure, back

## 2019-07-09 NOTE — PATIENT INSTRUCTIONS
Vaginal moisturizers    [facility] FOR PELVIC MEDICINE    PELVIC MUSCLE EXERCISES [facility])    The muscles that surround the vagina help to support the pelvic organs and maintain bladder and bowel control are called the “pelvic guevara. The goal is to maintain constant effort in an active squeeze in order to strengthen the muscles. It is better to maintain a strong active squeeze for a short period of time that to flick the muscle on and off for any period of time.   You ranjan

## 2019-07-11 ENCOUNTER — TELEPHONE (OUTPATIENT)
Dept: UROLOGY | Facility: HOSPITAL | Age: 59
End: 2019-07-11

## 2019-07-11 RX ORDER — CEPHALEXIN 500 MG/1
500 CAPSULE ORAL 2 TIMES DAILY
Qty: 14 CAPSULE | Refills: 0 | Status: SHIPPED | OUTPATIENT
Start: 2019-07-11 | End: 2019-08-12 | Stop reason: ALTCHOICE

## 2019-07-11 NOTE — TELEPHONE ENCOUNTER
Dr. Candido Kong reviewed urine culture results. TORB Keflex 500mg PO BID x7d, see orders. Called pt, spouse, Rylie Mate answered, HIPPA verified, he was notified of culture results and new orders, he verbalizes understanding and will notify the pt.   Pt to call if sx per

## 2019-07-16 NOTE — PROGRESS NOTES
Spoke with ALEKSANDRA Gustafson#[id number]  Left detailed message. Patient was advised to call us back with any further questions or concerns.

## 2019-07-31 PROBLEM — M81.0 SENILE OSTEOPOROSIS: Status: ACTIVE | Noted: 2019-07-31

## 2019-08-02 RX ORDER — ZOLEDRONIC ACID 5 MG/100ML
5 INJECTION, SOLUTION INTRAVENOUS ONCE
Status: CANCELLED | OUTPATIENT
Start: 2019-08-02

## 2019-08-05 ENCOUNTER — OFFICE VISIT (OUTPATIENT)
Dept: HEMATOLOGY/ONCOLOGY | Facility: HOSPITAL | Age: 59
End: 2019-08-05
Attending: INTERNAL MEDICINE
Payer: COMMERCIAL

## 2019-08-05 VITALS
TEMPERATURE: 98 F | RESPIRATION RATE: 20 BRPM | HEART RATE: 79 BPM | HEIGHT: 62.01 IN | WEIGHT: 119.81 LBS | SYSTOLIC BLOOD PRESSURE: 126 MMHG | DIASTOLIC BLOOD PRESSURE: 83 MMHG | BODY MASS INDEX: 21.77 KG/M2 | OXYGEN SATURATION: 97 %

## 2019-08-05 DIAGNOSIS — M81.0 SENILE OSTEOPOROSIS: Primary | ICD-10-CM

## 2019-08-05 LAB
ALBUMIN SERPL-MCNC: 3.6 G/DL (ref 3.4–5)
CALCIUM BLD-MCNC: 8.9 MG/DL (ref 8.5–10.1)
CREAT BLD-MCNC: 0.83 MG/DL (ref 0.55–1.02)

## 2019-08-05 PROCEDURE — 96365 THER/PROPH/DIAG IV INF INIT: CPT

## 2019-08-05 PROCEDURE — 82310 ASSAY OF CALCIUM: CPT

## 2019-08-05 PROCEDURE — 82040 ASSAY OF SERUM ALBUMIN: CPT

## 2019-08-05 PROCEDURE — 36415 COLL VENOUS BLD VENIPUNCTURE: CPT

## 2019-08-05 PROCEDURE — 82565 ASSAY OF CREATININE: CPT

## 2019-08-05 RX ORDER — ZOLEDRONIC ACID 5 MG/100ML
5 INJECTION, SOLUTION INTRAVENOUS ONCE
Status: COMPLETED | OUTPATIENT
Start: 2019-08-05 | End: 2019-08-05

## 2019-08-05 RX ADMIN — ZOLEDRONIC ACID 5 MG: 5 INJECTION, SOLUTION INTRAVENOUS at 15:25:00

## 2019-08-05 NOTE — PROGRESS NOTES
Education Record    Learner:  Patient and Family Member    Disease / Diagnosis:    Barriers / Limitations:  None   Comments:    Method:  Discussion   Comments:    General Topics:  Medication, Precautions, Side effects and symptom management and Plan of car

## 2019-08-05 NOTE — PATIENT INSTRUCTIONS
Zoledronic Acid injection (Paget's Disease, Osteoporosis)  Brand Names: Reclast, Zometa  What is this medicine? ZOLEDRONIC ACID (EMERITA jeffry garcia ik AS id) lowers the amount of calcium loss from bone.  It is used to treat Paget's disease and osteoporosis in w Where should I keep my medicine? This drug is given in a hospital or clinic and will not be stored at home. What should I tell my health care provider before I take this medicine?   They need to know if you have any of these conditions:  · aspirin-sensiti Tell your dentist and dental surgeon that you are taking this medicine. You should not have major dental surgery while on this medicine. See your dentist to have a dental exam and fix any dental problems before starting this medicine.  Take good care of you

## 2019-08-12 ENCOUNTER — HOSPITAL ENCOUNTER (EMERGENCY)
Facility: HOSPITAL | Age: 59
Discharge: HOME | End: 2019-08-12
Attending: EMERGENCY MEDICINE
Payer: COMMERCIAL

## 2019-08-12 ENCOUNTER — APPOINTMENT (OUTPATIENT)
Dept: CV DIAGNOSTICS | Facility: HOSPITAL | Age: 59
End: 2019-08-12
Attending: EMERGENCY MEDICINE
Payer: COMMERCIAL

## 2019-08-12 ENCOUNTER — APPOINTMENT (OUTPATIENT)
Dept: GENERAL RADIOLOGY | Facility: HOSPITAL | Age: 59
End: 2019-08-12
Attending: EMERGENCY MEDICINE
Payer: COMMERCIAL

## 2019-08-12 VITALS
BODY MASS INDEX: 21.71 KG/M2 | WEIGHT: 115 LBS | TEMPERATURE: 98 F | HEIGHT: 61 IN | DIASTOLIC BLOOD PRESSURE: 83 MMHG | OXYGEN SATURATION: 99 % | SYSTOLIC BLOOD PRESSURE: 140 MMHG | HEART RATE: 71 BPM | RESPIRATION RATE: 20 BRPM

## 2019-08-12 DIAGNOSIS — R07.89 CHEST PAIN, NON-CARDIAC: Primary | ICD-10-CM

## 2019-08-12 LAB
ALBUMIN SERPL-MCNC: 3.6 G/DL (ref 3.4–5)
ALBUMIN/GLOB SERPL: 0.9 {RATIO} (ref 1–2)
ALP LIVER SERPL-CCNC: 131 U/L (ref 46–118)
ALT SERPL-CCNC: 43 U/L (ref 13–56)
ANION GAP SERPL CALC-SCNC: 5 MMOL/L (ref 0–18)
APTT PPP: 28.8 SECONDS (ref 25.4–36.1)
AST SERPL-CCNC: 24 U/L (ref 15–37)
ATRIAL RATE: 66 BPM
BASOPHILS # BLD AUTO: 0.04 X10(3) UL (ref 0–0.2)
BASOPHILS NFR BLD AUTO: 0.5 %
BILIRUB SERPL-MCNC: 0.5 MG/DL (ref 0.1–2)
BUN BLD-MCNC: 10 MG/DL (ref 7–18)
BUN/CREAT SERPL: 15.6 (ref 10–20)
CALCIUM BLD-MCNC: 7.8 MG/DL (ref 8.5–10.1)
CHLORIDE SERPL-SCNC: 105 MMOL/L (ref 98–112)
CO2 SERPL-SCNC: 30 MMOL/L (ref 21–32)
CREAT BLD-MCNC: 0.64 MG/DL (ref 0.55–1.02)
D-DIMER: 0.38 UG/ML FEU (ref ?–0.58)
DEPRECATED RDW RBC AUTO: 44 FL (ref 35.1–46.3)
EOSINOPHIL # BLD AUTO: 0.15 X10(3) UL (ref 0–0.7)
EOSINOPHIL NFR BLD AUTO: 2.1 %
ERYTHROCYTE [DISTWIDTH] IN BLOOD BY AUTOMATED COUNT: 13.6 % (ref 11–15)
GLOBULIN PLAS-MCNC: 3.8 G/DL (ref 2.8–4.4)
GLUCOSE BLD-MCNC: 89 MG/DL (ref 70–99)
HCT VFR BLD AUTO: 41.7 % (ref 35–48)
HGB BLD-MCNC: 13.7 G/DL (ref 12–16)
IMM GRANULOCYTES # BLD AUTO: 0.02 X10(3) UL (ref 0–1)
IMM GRANULOCYTES NFR BLD: 0.3 %
INR BLD: 0.95 (ref 0.9–1.1)
LYMPHOCYTES # BLD AUTO: 2.32 X10(3) UL (ref 1–4)
LYMPHOCYTES NFR BLD AUTO: 31.8 %
M PROTEIN MFR SERPL ELPH: 7.4 G/DL (ref 6.4–8.2)
MCH RBC QN AUTO: 29 PG (ref 26–34)
MCHC RBC AUTO-ENTMCNC: 32.9 G/DL (ref 31–37)
MCV RBC AUTO: 88.3 FL (ref 80–100)
MONOCYTES # BLD AUTO: 0.52 X10(3) UL (ref 0.1–1)
MONOCYTES NFR BLD AUTO: 7.1 %
NEUTROPHILS # BLD AUTO: 4.24 X10 (3) UL (ref 1.5–7.7)
NEUTROPHILS # BLD AUTO: 4.24 X10(3) UL (ref 1.5–7.7)
NEUTROPHILS NFR BLD AUTO: 58.2 %
OSMOLALITY SERPL CALC.SUM OF ELEC: 289 MOSM/KG (ref 275–295)
P AXIS: -7 DEGREES
P-R INTERVAL: 136 MS
PLATELET # BLD AUTO: 286 10(3)UL (ref 150–450)
POTASSIUM SERPL-SCNC: 3.4 MMOL/L (ref 3.5–5.1)
PSA SERPL DL<=0.01 NG/ML-MCNC: 13 SECONDS (ref 12.5–14.7)
Q-T INTERVAL: 408 MS
QRS DURATION: 76 MS
QTC CALCULATION (BEZET): 427 MS
R AXIS: 53 DEGREES
RBC # BLD AUTO: 4.72 X10(6)UL (ref 3.8–5.3)
SODIUM SERPL-SCNC: 140 MMOL/L (ref 136–145)
T AXIS: 44 DEGREES
TROPONIN I SERPL-MCNC: <0.045 NG/ML (ref ?–0.04)
VENTRICULAR RATE: 66 BPM
WBC # BLD AUTO: 7.3 X10(3) UL (ref 4–11)

## 2019-08-12 PROCEDURE — 93005 ELECTROCARDIOGRAM TRACING: CPT

## 2019-08-12 PROCEDURE — 99285 EMERGENCY DEPT VISIT HI MDM: CPT

## 2019-08-12 PROCEDURE — 85378 FIBRIN DEGRADE SEMIQUANT: CPT | Performed by: EMERGENCY MEDICINE

## 2019-08-12 PROCEDURE — 85610 PROTHROMBIN TIME: CPT | Performed by: EMERGENCY MEDICINE

## 2019-08-12 PROCEDURE — 71045 X-RAY EXAM CHEST 1 VIEW: CPT | Performed by: EMERGENCY MEDICINE

## 2019-08-12 PROCEDURE — 85730 THROMBOPLASTIN TIME PARTIAL: CPT | Performed by: EMERGENCY MEDICINE

## 2019-08-12 PROCEDURE — 93350 STRESS TTE ONLY: CPT | Performed by: EMERGENCY MEDICINE

## 2019-08-12 PROCEDURE — 85025 COMPLETE CBC W/AUTO DIFF WBC: CPT | Performed by: EMERGENCY MEDICINE

## 2019-08-12 PROCEDURE — 93017 CV STRESS TEST TRACING ONLY: CPT | Performed by: EMERGENCY MEDICINE

## 2019-08-12 PROCEDURE — 93010 ELECTROCARDIOGRAM REPORT: CPT

## 2019-08-12 PROCEDURE — 93018 CV STRESS TEST I&R ONLY: CPT | Performed by: EMERGENCY MEDICINE

## 2019-08-12 PROCEDURE — 36415 COLL VENOUS BLD VENIPUNCTURE: CPT

## 2019-08-12 PROCEDURE — 80053 COMPREHEN METABOLIC PANEL: CPT | Performed by: EMERGENCY MEDICINE

## 2019-08-12 PROCEDURE — 84484 ASSAY OF TROPONIN QUANT: CPT | Performed by: EMERGENCY MEDICINE

## 2019-08-12 NOTE — ED PROVIDER NOTES
Patient Seen in: [facility] Emergency Department    History   Patient presents with:  Chest Pain Angina (cardiovascular)    Stated Complaint: chest pain    HPI    Patient is a 81-year-old female who presents emergency room with a history of some lef focal tenderness to palpation appreciated. There is no JVD. No meningeal signs or nuchal rigidity appreciated. No stridor. LUNGS: Clear to auscultation bilaterally with no wheeze. There is good equal air entry bilaterally. HEART: Regular rate and rhythm. -----------         ------                     CBC W/ DIFFERENTIAL[[id number]]                              Final result                 Please view results for these tests on the individual orders.    [facility] pulse ox and cardiac monitor and had no chest pain on repeat examination. Patient is sitting up and breathing easily in no apparent distress at this time.   The patient's case was discussed with Dr. Cee Rasheed who personally reviewed the patient's EKG and his

## 2019-08-12 NOTE — PROGRESS NOTES
Cardiac Diagnostics preliminary ER stress echo:  Patient walked 9 minutes on Pasquale protocol, achieving 87% heartrate and no arrhythmias or ST changes noted on EKG throughout test.  Video  used for patient.   Patient c/o 5/10 sharp, stabbing pain

## 2019-08-12 NOTE — ED INITIAL ASSESSMENT (HPI)
Patient to ED c/o central chest pain that started about 20 minutes prior to arrival while walking. Patient denies any chest pain at this time, but states that she feels \"tired\". Denies nausea, vomiting, JOANNE or dizziness. C/o mild headache as well.

## 2019-11-26 ENCOUNTER — APPOINTMENT (OUTPATIENT)
Dept: CT IMAGING | Facility: HOSPITAL | Age: 59
End: 2019-11-26
Attending: EMERGENCY MEDICINE
Payer: COMMERCIAL

## 2019-11-26 ENCOUNTER — HOSPITAL ENCOUNTER (EMERGENCY)
Facility: HOSPITAL | Age: 59
Discharge: HOME | End: 2019-11-27
Attending: EMERGENCY MEDICINE
Payer: COMMERCIAL

## 2019-11-26 VITALS
TEMPERATURE: 98 F | RESPIRATION RATE: 16 BRPM | OXYGEN SATURATION: 98 % | HEART RATE: 69 BPM | DIASTOLIC BLOOD PRESSURE: 87 MMHG | SYSTOLIC BLOOD PRESSURE: 150 MMHG

## 2019-11-26 DIAGNOSIS — H93.11 TINNITUS OF RIGHT EAR: ICD-10-CM

## 2019-11-26 DIAGNOSIS — R42 VERTIGO: Primary | ICD-10-CM

## 2019-11-26 PROCEDURE — 99285 EMERGENCY DEPT VISIT HI MDM: CPT

## 2019-11-26 PROCEDURE — 80053 COMPREHEN METABOLIC PANEL: CPT | Performed by: EMERGENCY MEDICINE

## 2019-11-26 PROCEDURE — 93010 ELECTROCARDIOGRAM REPORT: CPT

## 2019-11-26 PROCEDURE — 93005 ELECTROCARDIOGRAM TRACING: CPT

## 2019-11-26 PROCEDURE — 84484 ASSAY OF TROPONIN QUANT: CPT | Performed by: EMERGENCY MEDICINE

## 2019-11-26 PROCEDURE — 96361 HYDRATE IV INFUSION ADD-ON: CPT

## 2019-11-26 PROCEDURE — 96374 THER/PROPH/DIAG INJ IV PUSH: CPT

## 2019-11-26 PROCEDURE — 85025 COMPLETE CBC W/AUTO DIFF WBC: CPT | Performed by: EMERGENCY MEDICINE

## 2019-11-26 PROCEDURE — 70450 CT HEAD/BRAIN W/O DYE: CPT | Performed by: EMERGENCY MEDICINE

## 2019-11-26 RX ORDER — SODIUM CHLORIDE 9 MG/ML
INJECTION, SOLUTION INTRAVENOUS ONCE
Status: COMPLETED | OUTPATIENT
Start: 2019-11-26 | End: 2019-11-26

## 2019-11-26 RX ORDER — MECLIZINE HYDROCHLORIDE 25 MG/1
25 TABLET ORAL ONCE
Status: COMPLETED | OUTPATIENT
Start: 2019-11-26 | End: 2019-11-26

## 2019-11-26 RX ORDER — ONDANSETRON 2 MG/ML
4 INJECTION INTRAMUSCULAR; INTRAVENOUS ONCE
Status: COMPLETED | OUTPATIENT
Start: 2019-11-26 | End: 2019-11-26

## 2019-11-26 RX ORDER — LEVOFLOXACIN 500 MG/1
500 TABLET, FILM COATED ORAL DAILY
COMMUNITY
End: 2019-12-24 | Stop reason: ALTCHOICE

## 2019-11-27 RX ORDER — MECLIZINE HYDROCHLORIDE 25 MG/1
25 TABLET ORAL 3 TIMES DAILY PRN
Qty: 10 TABLET | Refills: 0 | Status: SHIPPED | OUTPATIENT
Start: 2019-11-27

## 2019-11-27 NOTE — ED PROVIDER NOTES
Patient Seen in: [facility] Emergency Department      History   Patient presents with:  Dizziness (neurologic)    Stated Complaint: dizzy x 10 days    HPI    61-year-old female presents emergency with chief complaint of dizziness.   Patient states sh nausea and vomiting)    • Psychogenic syncope    • Shortness of breath    • Sleep disturbance    • Stress    • TIA (transient ischemic attack)    • Uncomfortable fullness after meals    • Unspecified congenital anomaly of heart     RAC & LMCA have anomolou Neck is supple, there is no nuchal rigidity. No carotid bruits. No masses. Trachea midline. No cervical lymphadenopathy. HEART: Regular rate and rhythm, no murmurs. LUNGS: Clear to auscultation bilaterally.   No Rales, no rhonchi, no wheezing, no stri normal's, urinalysis unremarkable. CT the brain performed without any acute findings. Patient was given IV fluid hydration as well as meclizine, on reevaluation states her symptoms have markedly improved.   Patient symptoms and exam are consistent with pe

## 2019-11-27 NOTE — ED INITIAL ASSESSMENT (HPI)
Onset of approx 10 days ago, states increased over the last 5 days. C/o nausea w/chest pressure. Presently on Levaquin for OM and UTI.   Dizziness while laying down, states \"hard to hear\"

## 2019-12-26 ENCOUNTER — HOSPITAL ENCOUNTER (OUTPATIENT)
Dept: GENERAL RADIOLOGY | Facility: HOSPITAL | Age: 59
Discharge: HOME | End: 2019-12-26
Attending: NURSE PRACTITIONER
Payer: COMMERCIAL

## 2019-12-26 DIAGNOSIS — R10.32 LEFT LOWER QUADRANT PAIN: ICD-10-CM

## 2019-12-26 PROCEDURE — 74018 RADEX ABDOMEN 1 VIEW: CPT | Performed by: NURSE PRACTITIONER

## 2019-12-30 ENCOUNTER — LAB ENCOUNTER (OUTPATIENT)
Dept: LAB | Facility: HOSPITAL | Age: 59
End: 2019-12-30
Attending: NURSE PRACTITIONER
Payer: COMMERCIAL

## 2019-12-30 DIAGNOSIS — R10.32 LEFT LOWER QUADRANT PAIN: ICD-10-CM

## 2019-12-30 LAB
ALBUMIN SERPL-MCNC: 3.6 G/DL (ref 3.4–5)
ALBUMIN/GLOB SERPL: 1 {RATIO} (ref 1–2)
ALP LIVER SERPL-CCNC: 71 U/L (ref 46–118)
ALT SERPL-CCNC: 26 U/L (ref 13–56)
ANION GAP SERPL CALC-SCNC: 4 MMOL/L (ref 0–18)
AST SERPL-CCNC: 29 U/L (ref 15–37)
BASOPHILS # BLD AUTO: 0.04 X10(3) UL (ref 0–0.2)
BASOPHILS NFR BLD AUTO: 0.5 %
BILIRUB SERPL-MCNC: 0.7 MG/DL (ref 0.1–2)
BUN BLD-MCNC: 15 MG/DL (ref 7–18)
BUN/CREAT SERPL: 20 (ref 10–20)
CALCIUM BLD-MCNC: 8.8 MG/DL (ref 8.5–10.1)
CHLORIDE SERPL-SCNC: 108 MMOL/L (ref 98–112)
CO2 SERPL-SCNC: 31 MMOL/L (ref 21–32)
CREAT BLD-MCNC: 0.75 MG/DL (ref 0.55–1.02)
CRP SERPL-MCNC: <0.29 MG/DL (ref ?–0.3)
DEPRECATED RDW RBC AUTO: 43.1 FL (ref 35.1–46.3)
EOSINOPHIL # BLD AUTO: 0.19 X10(3) UL (ref 0–0.7)
EOSINOPHIL NFR BLD AUTO: 2.5 %
ERYTHROCYTE [DISTWIDTH] IN BLOOD BY AUTOMATED COUNT: 13.3 % (ref 11–15)
GLOBULIN PLAS-MCNC: 3.5 G/DL (ref 2.8–4.4)
GLUCOSE BLD-MCNC: 91 MG/DL (ref 70–99)
HCT VFR BLD AUTO: 43.3 % (ref 35–48)
HGB BLD-MCNC: 14.1 G/DL (ref 12–16)
IMM GRANULOCYTES # BLD AUTO: 0.02 X10(3) UL (ref 0–1)
IMM GRANULOCYTES NFR BLD: 0.3 %
LYMPHOCYTES # BLD AUTO: 1.84 X10(3) UL (ref 1–4)
LYMPHOCYTES NFR BLD AUTO: 24.3 %
M PROTEIN MFR SERPL ELPH: 7.1 G/DL (ref 6.4–8.2)
MCH RBC QN AUTO: 28.7 PG (ref 26–34)
MCHC RBC AUTO-ENTMCNC: 32.6 G/DL (ref 31–37)
MCV RBC AUTO: 88.2 FL (ref 80–100)
MONOCYTES # BLD AUTO: 0.48 X10(3) UL (ref 0.1–1)
MONOCYTES NFR BLD AUTO: 6.3 %
NEUTROPHILS # BLD AUTO: 4.99 X10 (3) UL (ref 1.5–7.7)
NEUTROPHILS # BLD AUTO: 4.99 X10(3) UL (ref 1.5–7.7)
NEUTROPHILS NFR BLD AUTO: 66.1 %
OSMOLALITY SERPL CALC.SUM OF ELEC: 296 MOSM/KG (ref 275–295)
PATIENT FASTING Y/N/NP: YES
PLATELET # BLD AUTO: 260 10(3)UL (ref 150–450)
POTASSIUM SERPL-SCNC: 3.7 MMOL/L (ref 3.5–5.1)
RBC # BLD AUTO: 4.91 X10(6)UL (ref 3.8–5.3)
SED RATE-ML: 9 MM/HR (ref 0–25)
SODIUM SERPL-SCNC: 143 MMOL/L (ref 136–145)
WBC # BLD AUTO: 7.6 X10(3) UL (ref 4–11)

## 2019-12-30 PROCEDURE — 85652 RBC SED RATE AUTOMATED: CPT

## 2019-12-30 PROCEDURE — 86140 C-REACTIVE PROTEIN: CPT

## 2019-12-30 PROCEDURE — 85025 COMPLETE CBC W/AUTO DIFF WBC: CPT

## 2019-12-30 PROCEDURE — 80053 COMPREHEN METABOLIC PANEL: CPT

## 2019-12-30 PROCEDURE — 36415 COLL VENOUS BLD VENIPUNCTURE: CPT

## 2020-01-01 NOTE — ED NOTES
Round on pt. Pt sts she wants to stay in the hospital for adm.  MD updated DISPLAY PLAN FREE TEXT DISPLAY PLAN FREE TEXT DISPLAY PLAN FREE TEXT DISPLAY PLAN FREE TEXT DISPLAY PLAN FREE TEXT DISPLAY PLAN FREE TEXT DISPLAY PLAN FREE TEXT DISPLAY PLAN FREE TEXT DISPLAY PLAN FREE TEXT

## 2020-01-29 ENCOUNTER — ANESTHESIA EVENT (OUTPATIENT)
Dept: ENDOSCOPY | Facility: HOSPITAL | Age: 60
End: 2020-01-29
Payer: COMMERCIAL

## 2020-01-30 ENCOUNTER — ANESTHESIA (OUTPATIENT)
Dept: ENDOSCOPY | Facility: HOSPITAL | Age: 60
End: 2020-01-30
Payer: COMMERCIAL

## 2020-01-30 ENCOUNTER — HOSPITAL ENCOUNTER (OUTPATIENT)
Facility: HOSPITAL | Age: 60
Setting detail: HOSPITAL OUTPATIENT SURGERY
Discharge: HOME | End: 2020-01-30
Attending: INTERNAL MEDICINE | Admitting: INTERNAL MEDICINE
Payer: COMMERCIAL

## 2020-01-30 VITALS
BODY MASS INDEX: 23.03 KG/M2 | SYSTOLIC BLOOD PRESSURE: 130 MMHG | OXYGEN SATURATION: 99 % | HEIGHT: 61 IN | HEART RATE: 73 BPM | DIASTOLIC BLOOD PRESSURE: 78 MMHG | TEMPERATURE: 98 F | WEIGHT: 122 LBS | RESPIRATION RATE: 20 BRPM

## 2020-01-30 DIAGNOSIS — R10.9 ABDOMINAL PAIN, UNSPECIFIED ABDOMINAL LOCATION: ICD-10-CM

## 2020-01-30 PROCEDURE — 88305 TISSUE EXAM BY PATHOLOGIST: CPT | Performed by: INTERNAL MEDICINE

## 2020-01-30 PROCEDURE — 0DBN8ZX EXCISION OF SIGMOID COLON, VIA NATURAL OR ARTIFICIAL OPENING ENDOSCOPIC, DIAGNOSTIC: ICD-10-PCS | Performed by: INTERNAL MEDICINE

## 2020-01-30 RX ORDER — SODIUM CHLORIDE, SODIUM LACTATE, POTASSIUM CHLORIDE, CALCIUM CHLORIDE 600; 310; 30; 20 MG/100ML; MG/100ML; MG/100ML; MG/100ML
INJECTION, SOLUTION INTRAVENOUS CONTINUOUS
Status: DISCONTINUED | OUTPATIENT
Start: 2020-01-30 | End: 2020-01-30

## 2020-01-30 RX ADMIN — SODIUM CHLORIDE, SODIUM LACTATE, POTASSIUM CHLORIDE, CALCIUM CHLORIDE: 600; 310; 30; 20 INJECTION, SOLUTION INTRAVENOUS at 09:08:00

## 2020-01-30 NOTE — ANESTHESIA PREPROCEDURE EVALUATION
PRE-OP EVALUATION    Patient Name: Yvrose Contreras    Pre-op Diagnosis: Abdominal pain, unspecified abdominal location [R10.9]    Procedure(s):  Colonoscopy    Surgeon(s) and Role:     Osvaldo Villavicencio MD - Primary    Pre-op vitals reviewed.         Body ma CHOLECYSTECTOMY  2008   • COLONOSCOPY Left 7/27/2018    Performed by Kendall Ruiz MD at [facility] ENDOSCOPY   • DILATION/CURETTAGE,DIAGNOSTIC  2003   • ESOPHAGOGASTRODUODENOSCOPY (EGD) N/A 7/27/2018    Performed by Eduar Henderson MD at [facility] ENDOSCOPY

## 2020-01-30 NOTE — ANESTHESIA POSTPROCEDURE EVALUATION
[address] Patient Status:  Hospital Outpatient Surgery   Age/Gender 63year old female MRN [id number]   Location [facility]3]. Attending Chary Bolden MD   Hosp Day # 0 PCP Devora Salomon MD       Anesthesia Post-o

## 2020-01-30 NOTE — H&P
[address] [facility] Patient Status:  Hospital Outpatient Surgery    1960 MRN [id number]   [facility] ENDOSCOPY Attending Jean Pierre Vigil MD   Date 2020 PCP Kanwal Swann MD     CC: Abdominal shirley GALLBLADDER       Family History   Problem Relation Age of Onset   • Heart Disorder Father    • Heart Disorder Mother    • Hypertension Sister    • Lipids Sister    • Other (Other) Sister         osteoporosis   • Hypertension Brother    • Other (Other) Son

## 2020-01-30 NOTE — OPERATIVE REPORT
[facility]right[facility] Patient Status:  Hospital Outpatient Surgery    1960 MRN [id number]   [facility] ENDOSCOPY Attending Madhu Cisse MD   Date 2020 PCP Shaina Palmer MD     PREOPERATIVE DIAGNOSIS/INDICATION: Abdominal pain, biopsies.  - Repeat colonoscopy in 5 years.     Mari Clements  1/30/2020  9:11 AM

## 2020-02-11 ENCOUNTER — HOSPITAL ENCOUNTER (EMERGENCY)
Facility: HOSPITAL | Age: 60
Discharge: HOME | End: 2020-02-11
Attending: EMERGENCY MEDICINE
Payer: COMMERCIAL

## 2020-02-11 VITALS
HEART RATE: 82 BPM | RESPIRATION RATE: 22 BRPM | WEIGHT: 120 LBS | SYSTOLIC BLOOD PRESSURE: 121 MMHG | OXYGEN SATURATION: 97 % | BODY MASS INDEX: 22.66 KG/M2 | DIASTOLIC BLOOD PRESSURE: 82 MMHG | HEIGHT: 61 IN | TEMPERATURE: 98 F

## 2020-02-11 DIAGNOSIS — J06.9 UPPER RESPIRATORY TRACT INFECTION, UNSPECIFIED TYPE: Primary | ICD-10-CM

## 2020-02-11 PROCEDURE — 99283 EMERGENCY DEPT VISIT LOW MDM: CPT | Performed by: EMERGENCY MEDICINE

## 2020-02-11 RX ORDER — OSELTAMIVIR PHOSPHATE 75 MG/1
75 CAPSULE ORAL 2 TIMES DAILY
Qty: 10 CAPSULE | Refills: 0 | Status: SHIPPED | OUTPATIENT
Start: 2020-02-11 | End: 2020-02-16

## 2020-02-11 NOTE — ED PROVIDER NOTES
Patient Seen in: [facility] Emergency Department      History   Patient presents with:  Fever    Stated Complaint: fever, cough, headache, does not know if she got flu shot    HPI  Patient 25-year-old female who states that since yesterday she has h COLONOSCOPY N/A 1/30/2020    Performed by Danielito Yoon MD at [facility] ENDOSCOPY   • COLONOSCOPY Left 7/27/2018    Performed by Jordi Flowers MD at [facility] ENDOSCOPY   • DILATION/CURETTAGE,DIAGNOSTIC  2003   • ESOPHAGOGASTRODUODENOSCOPY (EGD) N/A 7/27/2018 strength upper extremities. Conversant. ED Course   Labs Reviewed - No data to display               MDM     Patient already has a prescription for Z-Flako from her primary doctor which she has filled already. Patient is going to start that today.

## 2020-02-11 NOTE — ED INITIAL ASSESSMENT (HPI)
Pt wheelchaired in triage, reports fever, dry cough, body aches/chills, frontal lobe headache, eyes and joint pain, nausea, and bilateral hand tingling since yesterday.  Taking tylenol , last took at 1pm.  Denies vomiting, diarrhea, vision changes, or photo

## 2020-02-20 ENCOUNTER — APPOINTMENT (OUTPATIENT)
Dept: GENERAL RADIOLOGY | Facility: HOSPITAL | Age: 60
End: 2020-02-20
Attending: EMERGENCY MEDICINE
Payer: COMMERCIAL

## 2020-02-20 ENCOUNTER — HOSPITAL ENCOUNTER (EMERGENCY)
Facility: HOSPITAL | Age: 60
Discharge: HOME | End: 2020-02-20
Attending: EMERGENCY MEDICINE
Payer: COMMERCIAL

## 2020-02-20 VITALS
WEIGHT: 120 LBS | HEIGHT: 61 IN | HEART RATE: 69 BPM | RESPIRATION RATE: 14 BRPM | BODY MASS INDEX: 22.66 KG/M2 | DIASTOLIC BLOOD PRESSURE: 82 MMHG | SYSTOLIC BLOOD PRESSURE: 148 MMHG | TEMPERATURE: 98 F | OXYGEN SATURATION: 98 %

## 2020-02-20 DIAGNOSIS — E87.6 HYPOKALEMIA: ICD-10-CM

## 2020-02-20 DIAGNOSIS — R10.13 ABDOMINAL PAIN, EPIGASTRIC: Primary | ICD-10-CM

## 2020-02-20 LAB
ALBUMIN SERPL-MCNC: 3.3 G/DL (ref 3.4–5)
ALBUMIN/GLOB SERPL: 0.9 {RATIO} (ref 1–2)
ALP LIVER SERPL-CCNC: 81 U/L (ref 46–118)
ALT SERPL-CCNC: 54 U/L (ref 13–56)
ANION GAP SERPL CALC-SCNC: 5 MMOL/L (ref 0–18)
AST SERPL-CCNC: 60 U/L (ref 15–37)
ATRIAL RATE: 64 BPM
BASOPHILS # BLD AUTO: 0.07 X10(3) UL (ref 0–0.2)
BASOPHILS NFR BLD AUTO: 0.6 %
BILIRUB SERPL-MCNC: 0.5 MG/DL (ref 0.1–2)
BUN BLD-MCNC: 14 MG/DL (ref 7–18)
BUN/CREAT SERPL: 20.6 (ref 10–20)
CALCIUM BLD-MCNC: 7.9 MG/DL (ref 8.5–10.1)
CHLORIDE SERPL-SCNC: 106 MMOL/L (ref 98–112)
CO2 SERPL-SCNC: 30 MMOL/L (ref 21–32)
CREAT BLD-MCNC: 0.68 MG/DL (ref 0.55–1.02)
DEPRECATED RDW RBC AUTO: 44 FL (ref 35.1–46.3)
EOSINOPHIL # BLD AUTO: 0.18 X10(3) UL (ref 0–0.7)
EOSINOPHIL NFR BLD AUTO: 1.7 %
ERYTHROCYTE [DISTWIDTH] IN BLOOD BY AUTOMATED COUNT: 13.6 % (ref 11–15)
GLOBULIN PLAS-MCNC: 3.8 G/DL (ref 2.8–4.4)
GLUCOSE BLD-MCNC: 106 MG/DL (ref 70–99)
HCT VFR BLD AUTO: 42.5 % (ref 35–48)
HGB BLD-MCNC: 13.9 G/DL (ref 12–16)
IMM GRANULOCYTES # BLD AUTO: 0.15 X10(3) UL (ref 0–1)
IMM GRANULOCYTES NFR BLD: 1.4 %
LIPASE SERPL-CCNC: 270 U/L (ref 73–393)
LYMPHOCYTES # BLD AUTO: 4.94 X10(3) UL (ref 1–4)
LYMPHOCYTES NFR BLD AUTO: 45.7 %
M PROTEIN MFR SERPL ELPH: 7.1 G/DL (ref 6.4–8.2)
MCH RBC QN AUTO: 29.3 PG (ref 26–34)
MCHC RBC AUTO-ENTMCNC: 32.7 G/DL (ref 31–37)
MCV RBC AUTO: 89.7 FL (ref 80–100)
MONOCYTES # BLD AUTO: 0.69 X10(3) UL (ref 0.1–1)
MONOCYTES NFR BLD AUTO: 6.4 %
NEUTROPHILS # BLD AUTO: 4.78 X10 (3) UL (ref 1.5–7.7)
NEUTROPHILS # BLD AUTO: 4.78 X10(3) UL (ref 1.5–7.7)
NEUTROPHILS NFR BLD AUTO: 44.2 %
OSMOLALITY SERPL CALC.SUM OF ELEC: 293 MOSM/KG (ref 275–295)
P AXIS: 65 DEGREES
P-R INTERVAL: 170 MS
PLATELET # BLD AUTO: 340 10(3)UL (ref 150–450)
POTASSIUM SERPL-SCNC: 2.8 MMOL/L (ref 3.5–5.1)
Q-T INTERVAL: 402 MS
QRS DURATION: 80 MS
QTC CALCULATION (BEZET): 414 MS
R AXIS: 58 DEGREES
RBC # BLD AUTO: 4.74 X10(6)UL (ref 3.8–5.3)
SODIUM SERPL-SCNC: 141 MMOL/L (ref 136–145)
T AXIS: 49 DEGREES
TROPONIN I SERPL-MCNC: <0.045 NG/ML (ref ?–0.04)
VENTRICULAR RATE: 64 BPM
WBC # BLD AUTO: 10.8 X10(3) UL (ref 4–11)

## 2020-02-20 PROCEDURE — 84484 ASSAY OF TROPONIN QUANT: CPT | Performed by: EMERGENCY MEDICINE

## 2020-02-20 PROCEDURE — 83690 ASSAY OF LIPASE: CPT | Performed by: EMERGENCY MEDICINE

## 2020-02-20 PROCEDURE — 96361 HYDRATE IV INFUSION ADD-ON: CPT

## 2020-02-20 PROCEDURE — 93010 ELECTROCARDIOGRAM REPORT: CPT

## 2020-02-20 PROCEDURE — 99285 EMERGENCY DEPT VISIT HI MDM: CPT

## 2020-02-20 PROCEDURE — 96375 TX/PRO/DX INJ NEW DRUG ADDON: CPT

## 2020-02-20 PROCEDURE — 96366 THER/PROPH/DIAG IV INF ADDON: CPT

## 2020-02-20 PROCEDURE — S0028 INJECTION, FAMOTIDINE, 20 MG: HCPCS | Performed by: EMERGENCY MEDICINE

## 2020-02-20 PROCEDURE — 80053 COMPREHEN METABOLIC PANEL: CPT | Performed by: EMERGENCY MEDICINE

## 2020-02-20 PROCEDURE — 71045 X-RAY EXAM CHEST 1 VIEW: CPT | Performed by: EMERGENCY MEDICINE

## 2020-02-20 PROCEDURE — 96365 THER/PROPH/DIAG IV INF INIT: CPT

## 2020-02-20 PROCEDURE — 85025 COMPLETE CBC W/AUTO DIFF WBC: CPT | Performed by: EMERGENCY MEDICINE

## 2020-02-20 PROCEDURE — 93005 ELECTROCARDIOGRAM TRACING: CPT

## 2020-02-20 RX ORDER — FAMOTIDINE 10 MG/ML
20 INJECTION, SOLUTION INTRAVENOUS ONCE
Status: COMPLETED | OUTPATIENT
Start: 2020-02-20 | End: 2020-02-20

## 2020-02-20 RX ORDER — ONDANSETRON 2 MG/ML
4 INJECTION INTRAMUSCULAR; INTRAVENOUS ONCE
Status: COMPLETED | OUTPATIENT
Start: 2020-02-20 | End: 2020-02-20

## 2020-02-20 RX ORDER — BISMUTH SUBSALICYLATE 262 MG/1
1 TABLET, CHEWABLE ORAL
Qty: 30 TABLET | Refills: 0 | Status: SHIPPED | OUTPATIENT
Start: 2020-02-20 | End: 2020-09-27 | Stop reason: ALTCHOICE

## 2020-02-20 RX ORDER — POTASSIUM CHLORIDE 20 MEQ/1
40 TABLET, EXTENDED RELEASE ORAL ONCE
Status: COMPLETED | OUTPATIENT
Start: 2020-02-20 | End: 2020-02-20

## 2020-02-20 RX ORDER — POTASSIUM CHLORIDE 20 MEQ/1
20 TABLET, EXTENDED RELEASE ORAL 2 TIMES DAILY
Qty: 6 TABLET | Refills: 0 | Status: SHIPPED | OUTPATIENT
Start: 2020-02-20 | End: 2020-09-27 | Stop reason: ALTCHOICE

## 2020-02-20 RX ORDER — ONDANSETRON 4 MG/1
4 TABLET, ORALLY DISINTEGRATING ORAL EVERY 4 HOURS PRN
Qty: 10 TABLET | Refills: 0 | Status: SHIPPED | OUTPATIENT
Start: 2020-02-20 | End: 2020-02-27

## 2020-02-20 RX ORDER — MAGNESIUM HYDROXIDE/ALUMINUM HYDROXICE/SIMETHICONE 120; 1200; 1200 MG/30ML; MG/30ML; MG/30ML
30 SUSPENSION ORAL ONCE
Status: COMPLETED | OUTPATIENT
Start: 2020-02-20 | End: 2020-02-20

## 2020-02-20 RX ORDER — POTASSIUM CHLORIDE 14.9 MG/ML
20 INJECTION INTRAVENOUS ONCE
Status: COMPLETED | OUTPATIENT
Start: 2020-02-20 | End: 2020-02-20

## 2020-02-20 RX ORDER — BENZONATATE 100 MG/1
100 CAPSULE ORAL 3 TIMES DAILY PRN
Qty: 30 CAPSULE | Refills: 0 | Status: SHIPPED | OUTPATIENT
Start: 2020-02-20 | End: 2020-03-21

## 2020-02-20 NOTE — ED PROVIDER NOTES
Patient Seen in: [facility] Emergency Department      History   Patient presents with:  Abdomen/Flank Pain  Chest Pain Angina    Stated Complaint: abdominal pain, chest pressure    HPI    This is a 58-year-old female presents ED with complaints epig History:   Procedure Laterality Date   • ANGIOGRAM      abnormality seen ? no intervention   •  DELIVERY ONLY     • CHOLECYSTECTOMY     • COLONOSCOPY N/A 2020    Performed by Severo Posey, MD at [facility] ENDOSCOPY   • COLONOSCOPY Feliz Edmonds General: No deformity. Skin:     General: Skin is warm and dry. Capillary Refill: Capillary refill takes less than 2 seconds. Neurological:      Mental Status: She is alert and oriented to person, place, and time.    Psychiatric:         Behavior: going on for more than 8 hours only 1 troponin was obtained and was grossly negative. CBC and electrolytes reviewed as well as lipase grossly within normal limits.   Patient was given Maalox and Pepcid with significant improvement of her symptoms IV fluids

## 2020-02-20 NOTE — ED INITIAL ASSESSMENT (HPI)
Pt seen here approx 1 week ago, diagnosed with flu. Presents tonight with abdominal pain after taking cough medicine prescribed last week. Upon arrival, pain now also epigastric\"pressure\".

## 2020-03-07 ENCOUNTER — LAB ENCOUNTER (OUTPATIENT)
Dept: LAB | Facility: HOSPITAL | Age: 60
End: 2020-03-07
Attending: OTOLARYNGOLOGY
Payer: COMMERCIAL

## 2020-03-07 DIAGNOSIS — R42 DIZZINESS AND GIDDINESS: Primary | ICD-10-CM

## 2020-03-07 LAB
BUN BLD-MCNC: 11 MG/DL (ref 7–18)
CREAT BLD-MCNC: 0.66 MG/DL (ref 0.55–1.02)

## 2020-03-07 PROCEDURE — 82565 ASSAY OF CREATININE: CPT

## 2020-03-07 PROCEDURE — 84520 ASSAY OF UREA NITROGEN: CPT

## 2020-03-07 PROCEDURE — 36415 COLL VENOUS BLD VENIPUNCTURE: CPT

## 2020-03-13 ENCOUNTER — HOSPITAL ENCOUNTER (OUTPATIENT)
Dept: MRI IMAGING | Age: 60
Discharge: HOME | End: 2020-03-13
Attending: OTOLARYNGOLOGY
Payer: COMMERCIAL

## 2020-03-13 DIAGNOSIS — H93.A1 PULSATILE TINNITUS OF RIGHT EAR: ICD-10-CM

## 2020-03-13 DIAGNOSIS — R42 DIZZINESS AND GIDDINESS: ICD-10-CM

## 2020-03-13 DIAGNOSIS — H93.A9 TINNITUS OF VASCULAR ORIGIN: ICD-10-CM

## 2020-03-13 PROCEDURE — 70544 MR ANGIOGRAPHY HEAD W/O DYE: CPT | Performed by: OTOLARYNGOLOGY

## 2020-03-13 PROCEDURE — 70553 MRI BRAIN STEM W/O & W/DYE: CPT | Performed by: OTOLARYNGOLOGY

## 2020-03-13 PROCEDURE — 70546 MR ANGIOGRAPH HEAD W/O&W/DYE: CPT | Performed by: OTOLARYNGOLOGY

## 2020-03-13 PROCEDURE — A9575 INJ GADOTERATE MEGLUMI 0.1ML: HCPCS | Performed by: OTOLARYNGOLOGY

## 2020-06-02 ENCOUNTER — HOSPITAL ENCOUNTER (OUTPATIENT)
Dept: MAMMOGRAPHY | Facility: HOSPITAL | Age: 60
Discharge: HOME | End: 2020-06-02
Attending: INTERNAL MEDICINE
Payer: COMMERCIAL

## 2020-06-02 DIAGNOSIS — Z87.898 HX OF ABNORMAL MAMMOGRAM: ICD-10-CM

## 2020-06-02 PROCEDURE — 76642 ULTRASOUND BREAST LIMITED: CPT | Performed by: INTERNAL MEDICINE

## 2020-06-02 PROCEDURE — 77062 BREAST TOMOSYNTHESIS BI: CPT | Performed by: INTERNAL MEDICINE

## 2020-06-02 PROCEDURE — 77066 DX MAMMO INCL CAD BI: CPT | Performed by: INTERNAL MEDICINE

## 2020-09-27 ENCOUNTER — APPOINTMENT (OUTPATIENT)
Dept: GENERAL RADIOLOGY | Facility: HOSPITAL | Age: 60
End: 2020-09-27
Attending: EMERGENCY MEDICINE
Payer: COMMERCIAL

## 2020-09-27 ENCOUNTER — HOSPITAL ENCOUNTER (EMERGENCY)
Facility: HOSPITAL | Age: 60
Discharge: HOME | End: 2020-09-27
Attending: EMERGENCY MEDICINE
Payer: COMMERCIAL

## 2020-09-27 VITALS
HEART RATE: 72 BPM | TEMPERATURE: 98 F | SYSTOLIC BLOOD PRESSURE: 122 MMHG | RESPIRATION RATE: 18 BRPM | HEIGHT: 61 IN | OXYGEN SATURATION: 99 % | BODY MASS INDEX: 22.66 KG/M2 | DIASTOLIC BLOOD PRESSURE: 80 MMHG | WEIGHT: 120 LBS

## 2020-09-27 DIAGNOSIS — S39.012A LUMBAR STRAIN, INITIAL ENCOUNTER: Primary | ICD-10-CM

## 2020-09-27 LAB
ALBUMIN SERPL-MCNC: 3.5 G/DL (ref 3.4–5)
ALBUMIN/GLOB SERPL: 0.9 {RATIO} (ref 1–2)
ALP LIVER SERPL-CCNC: 91 U/L
ALT SERPL-CCNC: 43 U/L
ANION GAP SERPL CALC-SCNC: 3 MMOL/L (ref 0–18)
AST SERPL-CCNC: 28 U/L (ref 15–37)
BASOPHILS # BLD AUTO: 0.04 X10(3) UL (ref 0–0.2)
BASOPHILS NFR BLD AUTO: 0.5 %
BILIRUB SERPL-MCNC: 0.5 MG/DL (ref 0.1–2)
BILIRUB UR QL STRIP.AUTO: NEGATIVE
BUN BLD-MCNC: 12 MG/DL (ref 7–18)
BUN/CREAT SERPL: 14.5 (ref 10–20)
CALCIUM BLD-MCNC: 8.5 MG/DL (ref 8.5–10.1)
CHLORIDE SERPL-SCNC: 104 MMOL/L (ref 98–112)
CLARITY UR REFRACT.AUTO: CLEAR
CO2 SERPL-SCNC: 32 MMOL/L (ref 21–32)
CREAT BLD-MCNC: 0.83 MG/DL
DEPRECATED RDW RBC AUTO: 42.7 FL (ref 35.1–46.3)
EOSINOPHIL # BLD AUTO: 0.11 X10(3) UL (ref 0–0.7)
EOSINOPHIL NFR BLD AUTO: 1.3 %
ERYTHROCYTE [DISTWIDTH] IN BLOOD BY AUTOMATED COUNT: 13.2 % (ref 11–15)
GLOBULIN PLAS-MCNC: 4 G/DL (ref 2.8–4.4)
GLUCOSE BLD-MCNC: 86 MG/DL (ref 70–99)
GLUCOSE UR STRIP.AUTO-MCNC: NEGATIVE MG/DL
HCT VFR BLD AUTO: 43.5 %
HGB BLD-MCNC: 13.9 G/DL
IMM GRANULOCYTES # BLD AUTO: 0.03 X10(3) UL (ref 0–1)
IMM GRANULOCYTES NFR BLD: 0.3 %
KETONES UR STRIP.AUTO-MCNC: NEGATIVE MG/DL
LEUKOCYTE ESTERASE UR QL STRIP.AUTO: NEGATIVE
LYMPHOCYTES # BLD AUTO: 2.56 X10(3) UL (ref 1–4)
LYMPHOCYTES NFR BLD AUTO: 29.7 %
M PROTEIN MFR SERPL ELPH: 7.5 G/DL (ref 6.4–8.2)
MCH RBC QN AUTO: 28.1 PG (ref 26–34)
MCHC RBC AUTO-ENTMCNC: 32 G/DL (ref 31–37)
MCV RBC AUTO: 87.9 FL
MONOCYTES # BLD AUTO: 0.61 X10(3) UL (ref 0.1–1)
MONOCYTES NFR BLD AUTO: 7.1 %
NEUTROPHILS # BLD AUTO: 5.26 X10 (3) UL (ref 1.5–7.7)
NEUTROPHILS # BLD AUTO: 5.26 X10(3) UL (ref 1.5–7.7)
NEUTROPHILS NFR BLD AUTO: 61.1 %
NITRITE UR QL STRIP.AUTO: NEGATIVE
OSMOLALITY SERPL CALC.SUM OF ELEC: 287 MOSM/KG (ref 275–295)
PH UR STRIP.AUTO: 7 [PH] (ref 4.5–8)
PLATELET # BLD AUTO: 264 10(3)UL (ref 150–450)
POTASSIUM SERPL-SCNC: 3.1 MMOL/L (ref 3.5–5.1)
PROT UR STRIP.AUTO-MCNC: NEGATIVE MG/DL
RBC # BLD AUTO: 4.95 X10(6)UL
RBC UR QL AUTO: NEGATIVE
SODIUM SERPL-SCNC: 139 MMOL/L (ref 136–145)
SP GR UR STRIP.AUTO: <1.005 (ref 1–1.03)
UROBILINOGEN UR STRIP.AUTO-MCNC: <2 MG/DL
WBC # BLD AUTO: 8.6 X10(3) UL (ref 4–11)

## 2020-09-27 PROCEDURE — 80053 COMPREHEN METABOLIC PANEL: CPT

## 2020-09-27 PROCEDURE — 72110 X-RAY EXAM L-2 SPINE 4/>VWS: CPT | Performed by: EMERGENCY MEDICINE

## 2020-09-27 PROCEDURE — 96374 THER/PROPH/DIAG INJ IV PUSH: CPT

## 2020-09-27 PROCEDURE — 81003 URINALYSIS AUTO W/O SCOPE: CPT

## 2020-09-27 PROCEDURE — 85025 COMPLETE CBC W/AUTO DIFF WBC: CPT

## 2020-09-27 PROCEDURE — 99285 EMERGENCY DEPT VISIT HI MDM: CPT

## 2020-09-27 PROCEDURE — 81003 URINALYSIS AUTO W/O SCOPE: CPT | Performed by: EMERGENCY MEDICINE

## 2020-09-27 PROCEDURE — 85025 COMPLETE CBC W/AUTO DIFF WBC: CPT | Performed by: EMERGENCY MEDICINE

## 2020-09-27 PROCEDURE — 99284 EMERGENCY DEPT VISIT MOD MDM: CPT

## 2020-09-27 PROCEDURE — 80053 COMPREHEN METABOLIC PANEL: CPT | Performed by: EMERGENCY MEDICINE

## 2020-09-27 RX ORDER — DICYCLOMINE HYDROCHLORIDE 10 MG/1
10 CAPSULE ORAL
COMMUNITY
End: 2021-02-05

## 2020-09-27 RX ORDER — TRAMADOL HYDROCHLORIDE 50 MG/1
50 TABLET ORAL EVERY 6 HOURS PRN
Qty: 20 TABLET | Refills: 0 | Status: SHIPPED | OUTPATIENT
Start: 2020-09-27 | End: 2021-10-12 | Stop reason: ALTCHOICE

## 2020-09-27 RX ORDER — CYCLOBENZAPRINE HCL 10 MG
10 TABLET ORAL 3 TIMES DAILY PRN
Qty: 15 TABLET | Refills: 0 | Status: SHIPPED | OUTPATIENT
Start: 2020-09-27 | End: 2020-10-05 | Stop reason: ALTCHOICE

## 2020-09-27 RX ORDER — LORATADINE 10 MG/1
10 TABLET ORAL DAILY
COMMUNITY

## 2020-09-27 RX ORDER — FLUTICASONE PROPIONATE 50 MCG
SPRAY, SUSPENSION (ML) NASAL AS NEEDED
COMMUNITY

## 2020-09-27 RX ORDER — AMLODIPINE BESYLATE 5 MG/1
5 TABLET ORAL DAILY
COMMUNITY

## 2020-09-27 RX ORDER — CIPROFLOXACIN 500 MG/1
500 TABLET, FILM COATED ORAL 2 TIMES DAILY
COMMUNITY
End: 2020-10-05 | Stop reason: ALTCHOICE

## 2020-09-27 RX ORDER — KETOROLAC TROMETHAMINE 30 MG/ML
30 INJECTION, SOLUTION INTRAMUSCULAR; INTRAVENOUS ONCE
Status: COMPLETED | OUTPATIENT
Start: 2020-09-27 | End: 2020-09-27

## 2020-09-27 RX ORDER — FAMOTIDINE 40 MG/1
40 TABLET, FILM COATED ORAL NIGHTLY
COMMUNITY
End: 2021-02-05

## 2020-09-27 NOTE — ED INITIAL ASSESSMENT (HPI)
Pt c/o right back pain that radiates into groin x1 week. Reports PCP told her she has blood in her urine.  Has been having chills, fatigue and intermittent burning w/ urination

## 2020-09-27 NOTE — ED PROVIDER NOTES
Patient Seen in: [facility] Emergency Department      History   Patient presents with:  Urinary Symptoms    Stated Complaint: UTI    HPI    64-year-old female presents with back pain diffusely across the lower abdomen.   She states the pain is 10 out History:   Procedure Laterality Date   • ANGIOGRAM      abnormality seen ? no intervention   •  DELIVERY ONLY     • CHOLECYSTECTOMY     • COLONOSCOPY N/A 2020    Performed by Jean Pierre Vigil MD at [facility] ENDOSCOPY   • COLONOSCOPY [employer] and twisting of the lumbar spine.   Neuro: Alert oriented and nonfocal   Skin: no rashes or nodules    ED Course     Labs Reviewed   COMP METABOLIC PANEL (14) - Abnormal; Notable for the following components:       Result Value    Potassium 3.1 (*)     A/G not show any evidence of infection. She still has 8 days of her antibiotics left. I have instructed her to finish a 3-day course and she is comfortable with this. No leukocytosis. Afebrile here.   She has no tenderness on palpation of the abdomen and he

## 2020-09-29 ENCOUNTER — APPOINTMENT (OUTPATIENT)
Dept: GENERAL RADIOLOGY | Facility: HOSPITAL | Age: 60
End: 2020-09-29
Attending: EMERGENCY MEDICINE
Payer: COMMERCIAL

## 2020-09-29 ENCOUNTER — HOSPITAL ENCOUNTER (EMERGENCY)
Facility: HOSPITAL | Age: 60
Discharge: HOME | End: 2020-09-29
Attending: EMERGENCY MEDICINE
Payer: COMMERCIAL

## 2020-09-29 ENCOUNTER — APPOINTMENT (OUTPATIENT)
Dept: CT IMAGING | Facility: HOSPITAL | Age: 60
End: 2020-09-29
Attending: EMERGENCY MEDICINE
Payer: COMMERCIAL

## 2020-09-29 VITALS
HEIGHT: 61 IN | SYSTOLIC BLOOD PRESSURE: 132 MMHG | DIASTOLIC BLOOD PRESSURE: 70 MMHG | RESPIRATION RATE: 16 BRPM | BODY MASS INDEX: 22.66 KG/M2 | TEMPERATURE: 98 F | OXYGEN SATURATION: 96 % | WEIGHT: 120 LBS | HEART RATE: 73 BPM

## 2020-09-29 DIAGNOSIS — M54.9 BACK PAIN WITH RADIATION: ICD-10-CM

## 2020-09-29 DIAGNOSIS — K59.00 CONSTIPATION, UNSPECIFIED CONSTIPATION TYPE: Primary | ICD-10-CM

## 2020-09-29 PROCEDURE — 99285 EMERGENCY DEPT VISIT HI MDM: CPT

## 2020-09-29 PROCEDURE — 74177 CT ABD & PELVIS W/CONTRAST: CPT | Performed by: EMERGENCY MEDICINE

## 2020-09-29 PROCEDURE — 71045 X-RAY EXAM CHEST 1 VIEW: CPT | Performed by: EMERGENCY MEDICINE

## 2020-09-29 PROCEDURE — 96360 HYDRATION IV INFUSION INIT: CPT

## 2020-09-29 PROCEDURE — 85730 THROMBOPLASTIN TIME PARTIAL: CPT | Performed by: EMERGENCY MEDICINE

## 2020-09-29 PROCEDURE — 93010 ELECTROCARDIOGRAM REPORT: CPT

## 2020-09-29 PROCEDURE — 96361 HYDRATE IV INFUSION ADD-ON: CPT

## 2020-09-29 PROCEDURE — 84484 ASSAY OF TROPONIN QUANT: CPT | Performed by: EMERGENCY MEDICINE

## 2020-09-29 PROCEDURE — 85610 PROTHROMBIN TIME: CPT | Performed by: EMERGENCY MEDICINE

## 2020-09-29 PROCEDURE — 81003 URINALYSIS AUTO W/O SCOPE: CPT | Performed by: EMERGENCY MEDICINE

## 2020-09-29 PROCEDURE — 85025 COMPLETE CBC W/AUTO DIFF WBC: CPT | Performed by: EMERGENCY MEDICINE

## 2020-09-29 PROCEDURE — 93005 ELECTROCARDIOGRAM TRACING: CPT

## 2020-09-29 PROCEDURE — 83690 ASSAY OF LIPASE: CPT | Performed by: EMERGENCY MEDICINE

## 2020-09-29 PROCEDURE — 80053 COMPREHEN METABOLIC PANEL: CPT | Performed by: EMERGENCY MEDICINE

## 2020-09-29 RX ORDER — POTASSIUM CHLORIDE 20 MEQ/1
40 TABLET, EXTENDED RELEASE ORAL ONCE
Status: COMPLETED | OUTPATIENT
Start: 2020-09-29 | End: 2020-09-29

## 2020-09-29 RX ORDER — POTASSIUM CHLORIDE 20 MEQ/1
20 TABLET, EXTENDED RELEASE ORAL 2 TIMES DAILY
Qty: 6 TABLET | Refills: 0 | Status: SHIPPED | OUTPATIENT
Start: 2020-09-29 | End: 2020-10-02

## 2020-09-29 RX ORDER — SODIUM CHLORIDE 9 MG/ML
125 INJECTION, SOLUTION INTRAVENOUS CONTINUOUS
Status: DISCONTINUED | OUTPATIENT
Start: 2020-09-29 | End: 2020-09-29

## 2020-09-29 RX ORDER — POLYETHYLENE GLYCOL 3350 17 G/17G
17 POWDER, FOR SOLUTION ORAL DAILY PRN
Qty: 12 EACH | Refills: 0 | Status: SHIPPED | OUTPATIENT
Start: 2020-09-29 | End: 2020-10-05 | Stop reason: ALTCHOICE

## 2020-09-29 NOTE — ED INITIAL ASSESSMENT (HPI)
Patient c/o pain in back that hasn't changed since she was discharged. The pain radiates around to her abdomen and up to under her breasts.  She reports that she has not had a bowel movement over the last 3 days, stomach bloating, and vomited yellow today a

## 2020-09-29 NOTE — ED PROVIDER NOTES
Patient Seen in: [facility] Emergency Department      History   Patient presents with:  Constipation  Back Pain    Stated Complaint: back pain and constipation    HPI    Flaco Balderrama is a pleasant 16-year-old female coming complaints of back pain.   Patient Visual impairment     glasses   • Wears glasses               Past Surgical History:   Procedure Laterality Date   • ANGIOGRAM  2011    abnormality seen ? no intervention   • [facility]   • CHOLECYSTECTOMY  2008   • COLONOSCOPY N/A 1/30/2 within normal limits   URINALYSIS WITH CULTURE REFLEX - Abnormal; Notable for the following components:    Mucous Urine 1+ (*)     All other components within normal limits   CBC W/ DIFFERENTIAL - Abnormal; Notable for the following components:    Neutroph diagnosis)  Back pain with radiation    Disposition:  Discharge  9/29/2020  7:52 pm    Follow-up:  No follow-up provider specified.         Medications Prescribed:  Current Discharge Medication List    START taking these medications    Potassium Chloride ER

## 2020-10-05 ENCOUNTER — OFFICE VISIT (OUTPATIENT)
Dept: SURGERY | Facility: CLINIC | Age: 60
End: 2020-10-05
Payer: COMMERCIAL

## 2020-10-05 VITALS — DIASTOLIC BLOOD PRESSURE: 72 MMHG | SYSTOLIC BLOOD PRESSURE: 108 MMHG | HEART RATE: 61 BPM

## 2020-10-05 DIAGNOSIS — M47.816 LUMBAR SPONDYLOSIS: Primary | ICD-10-CM

## 2020-10-05 PROCEDURE — 99203 OFFICE O/P NEW LOW 30 MIN: CPT | Performed by: PHYSICIAN ASSISTANT

## 2020-10-05 PROCEDURE — 3074F SYST BP LT 130 MM HG: CPT | Performed by: PHYSICIAN ASSISTANT

## 2020-10-05 PROCEDURE — 3078F DIAST BP <80 MM HG: CPT | Performed by: PHYSICIAN ASSISTANT

## 2020-10-05 RX ORDER — MELOXICAM 7.5 MG/1
7.5 TABLET ORAL DAILY
Qty: 30 TABLET | Refills: 0 | Status: SHIPPED | OUTPATIENT
Start: 2020-10-05 | End: 2020-11-13

## 2020-10-05 RX ORDER — CYCLOBENZAPRINE HCL 10 MG
10 TABLET ORAL 3 TIMES DAILY PRN
Qty: 20 TABLET | Refills: 0 | Status: SHIPPED | OUTPATIENT
Start: 2020-10-05

## 2020-10-05 RX ORDER — ROSUVASTATIN CALCIUM 10 MG/1
1 TABLET, COATED ORAL
COMMUNITY
End: 2021-01-13

## 2020-10-05 NOTE — PROGRESS NOTES
Patient: Real Dunn  Medical Record Number: [id number]  PCP: Cyn Smith MD      HISTORY OF CHIEF COMPLAINT:    Real Dunn is a 62year old female, who complains of 1 week h/o low back pain.  Pt is Maltese speaking and her son was present as hyperlipidemia    • Pain in joints    • Pneumonia, organism unspecified(486) 2009   • PONV (postoperative nausea and vomiting)    • Psychogenic syncope    • Shortness of breath    • Stress    • Stroke [facility])     mini stroke- 2017   • TIA (transient ischemic Smoking status: Never Smoker      Smokeless tobacco: Never Used    Substance and Sexual Activity      Alcohol use: No        Alcohol/week: 0.0 standard drinks      Drug use: No      Sexual activity: Yes        Partners: Male    Other Topics      Concerns: Release Take 30 mg by mouth 2 (two) times daily. • Rosuvastatin Calcium 10 MG Oral Tab Take 1 tablet by mouth. • Na Sulfate-K Sulfate-Mg Sulf (SUPREP BOWEL PREP KIT) 17.5-3.13-1.6 GM/177ML Oral Solution Take as directed by physician.  (Patient not t ulcerations. LYMPH EXAM: There is no edema in bilateral lower extremities. VASCULAR EXAM: pulses are normal bilaterally, with good distal perfusion. No clubbing or cyanosis. Calves supple, NT   ABDOMINAL EXAM: Abdomen is soft, non-distended.   Eladia Ports

## 2020-11-02 NOTE — TELEPHONE ENCOUNTER
Medication:  meloxicam 7.5 mg   QD #30/0  Date of last refill: 10-5-2020  [place]    Last office visit: 10-5-2020  Due back to clinic per last office note:  PRN  Date next office visit scheduled: none      Last OV no

## 2020-11-13 RX ORDER — MELOXICAM 7.5 MG/1
TABLET ORAL
Qty: 30 TABLET | Refills: 0 | Status: SHIPPED | OUTPATIENT
Start: 2020-11-13 | End: 2020-12-17

## 2020-11-14 ENCOUNTER — HOSPITAL ENCOUNTER (OUTPATIENT)
Dept: MRI IMAGING | Facility: HOSPITAL | Age: 60
Discharge: HOME | End: 2020-11-14
Attending: OTOLARYNGOLOGY
Payer: COMMERCIAL

## 2020-11-14 DIAGNOSIS — H70.002 ACUTE MASTOIDITIS, LEFT: ICD-10-CM

## 2020-11-14 PROCEDURE — A9575 INJ GADOTERATE MEGLUMI 0.1ML: HCPCS

## 2020-11-14 PROCEDURE — 70553 MRI BRAIN STEM W/O & W/DYE: CPT | Performed by: OTOLARYNGOLOGY

## 2020-12-17 RX ORDER — MELOXICAM 7.5 MG/1
TABLET ORAL
Qty: 30 TABLET | Refills: 0 | Status: SHIPPED | OUTPATIENT
Start: 2020-12-17 | End: 2021-01-25

## 2020-12-17 NOTE — TELEPHONE ENCOUNTER
Medication: Meloxicam 7.5mg 30#/0    Date of last refill: 11/13/2020    Last office visit: 10/05/2020  Due back to clinic per last office note:  PRN  Date next office visit scheduled:  No next OV scheduled      Last OV note recommendation: Per Jona Cardoza

## 2021-01-13 ENCOUNTER — OFFICE VISIT (OUTPATIENT)
Dept: SURGERY | Facility: CLINIC | Age: 61
End: 2021-01-13
Payer: COMMERCIAL

## 2021-01-13 VITALS
BODY MASS INDEX: 22.08 KG/M2 | HEIGHT: 62 IN | DIASTOLIC BLOOD PRESSURE: 78 MMHG | WEIGHT: 120 LBS | HEART RATE: 62 BPM | SYSTOLIC BLOOD PRESSURE: 110 MMHG

## 2021-01-13 DIAGNOSIS — M47.816 LUMBAR SPONDYLOSIS: Primary | ICD-10-CM

## 2021-01-13 PROCEDURE — 3008F BODY MASS INDEX DOCD: CPT | Performed by: NEUROLOGICAL SURGERY

## 2021-01-13 PROCEDURE — 99213 OFFICE O/P EST LOW 20 MIN: CPT | Performed by: NEUROLOGICAL SURGERY

## 2021-01-13 PROCEDURE — 3078F DIAST BP <80 MM HG: CPT | Performed by: NEUROLOGICAL SURGERY

## 2021-01-13 PROCEDURE — 3074F SYST BP LT 130 MM HG: CPT | Performed by: NEUROLOGICAL SURGERY

## 2021-01-13 RX ORDER — TRAMADOL HYDROCHLORIDE 50 MG/1
50 TABLET ORAL
Qty: 60 TABLET | Refills: 0 | Status: SHIPPED | OUTPATIENT
Start: 2021-01-13 | End: 2021-02-05

## 2021-01-13 RX ORDER — CEFUROXIME AXETIL 250 MG/1
250 TABLET ORAL 2 TIMES DAILY
COMMUNITY
Start: 2020-11-17 | End: 2021-02-05

## 2021-01-13 RX ORDER — CIPROFLOXACIN 500 MG/1
500 TABLET, FILM COATED ORAL EVERY 12 HOURS
COMMUNITY
Start: 2021-01-06 | End: 2021-02-05

## 2021-01-13 RX ORDER — AMLODIPINE BESYLATE 2.5 MG/1
TABLET ORAL
COMMUNITY
Start: 2020-11-17 | End: 2021-02-05

## 2021-01-13 NOTE — PROGRESS NOTES
[facility]  Neurological Surgery Follow Up Clinic Note    Steven Barrera 62year old, female    1960 MRN [id number]   PCP Abdullahi Mae MD Allergies No Known Allergies     SUBJECTIVE:  Evelyne Terrazas is a 42-year-old female who sa

## 2021-01-13 NOTE — PROGRESS NOTES
Patient here for follow-up on lumbar strain. Reports worsening back pain, states hard to breathe when pain is bad. Located middle low back to right side.

## 2021-01-25 RX ORDER — MELOXICAM 7.5 MG/1
TABLET ORAL
Qty: 30 TABLET | Refills: 0 | Status: SHIPPED | OUTPATIENT
Start: 2021-01-25 | End: 2021-02-25

## 2021-01-25 NOTE — TELEPHONE ENCOUNTER
Medication: Meloxicam 7.5mg 30#/0    Date of last refill: 12/17/2020    Last office visit: 01/13/2021  Due back to clinic per last office note:  F/U upon MRI lumbar completion  Date next office visit scheduled:  02/10/2021      Last OV note recommendation:

## 2021-01-27 ENCOUNTER — HOSPITAL ENCOUNTER (OUTPATIENT)
Dept: MRI IMAGING | Facility: HOSPITAL | Age: 61
Discharge: HOME | End: 2021-01-27
Attending: NEUROLOGICAL SURGERY
Payer: COMMERCIAL

## 2021-01-27 DIAGNOSIS — M47.816 LUMBAR SPONDYLOSIS: ICD-10-CM

## 2021-01-27 PROCEDURE — 72148 MRI LUMBAR SPINE W/O DYE: CPT | Performed by: NEUROLOGICAL SURGERY

## 2021-02-05 ENCOUNTER — OFFICE VISIT (OUTPATIENT)
Dept: OBGYN CLINIC | Facility: CLINIC | Age: 61
End: 2021-02-05
Payer: COMMERCIAL

## 2021-02-05 VITALS
HEIGHT: 62 IN | WEIGHT: 127.19 LBS | DIASTOLIC BLOOD PRESSURE: 78 MMHG | BODY MASS INDEX: 23.4 KG/M2 | SYSTOLIC BLOOD PRESSURE: 120 MMHG

## 2021-02-05 DIAGNOSIS — N64.4 BREAST PAIN: Primary | ICD-10-CM

## 2021-02-05 PROCEDURE — 3008F BODY MASS INDEX DOCD: CPT | Performed by: NURSE PRACTITIONER

## 2021-02-05 PROCEDURE — 3074F SYST BP LT 130 MM HG: CPT | Performed by: NURSE PRACTITIONER

## 2021-02-05 PROCEDURE — 99213 OFFICE O/P EST LOW 20 MIN: CPT | Performed by: NURSE PRACTITIONER

## 2021-02-05 PROCEDURE — 3078F DIAST BP <80 MM HG: CPT | Performed by: NURSE PRACTITIONER

## 2021-02-05 NOTE — PROGRESS NOTES
Gyne note     S: patient is a 62year old yo  here for persistent breast pain x 2.5- 3 years. An  was present for the entire exam today.  She has had multiple mammograms- diagnostic with ultrasound and they have been normal. Her mammogram

## 2021-02-05 NOTE — PATIENT INSTRUCTIONS
Vitamin E 400 international units Daily for 4-6 weeks    If no improvement Evening Primrose for 4-6 weeks

## 2021-02-25 RX ORDER — MELOXICAM 7.5 MG/1
TABLET ORAL
Qty: 30 TABLET | Refills: 0 | Status: SHIPPED | OUTPATIENT
Start: 2021-02-25 | End: 2021-03-29

## 2021-02-25 NOTE — TELEPHONE ENCOUNTER
Medication: Meloxicam 7.5mg 30#/0    Date of last refill: 01/25/21    Last office visit: 01/13/21  Due back to clinic per last office note:  After MRI completed  Date next office visit scheduled:  03/03/21      Last OV note recommendation: Per Krissy Odom

## 2021-03-03 ENCOUNTER — OFFICE VISIT (OUTPATIENT)
Dept: SURGERY | Facility: CLINIC | Age: 61
End: 2021-03-03
Payer: COMMERCIAL

## 2021-03-03 VITALS
WEIGHT: 120 LBS | SYSTOLIC BLOOD PRESSURE: 120 MMHG | HEIGHT: 62 IN | DIASTOLIC BLOOD PRESSURE: 80 MMHG | BODY MASS INDEX: 22.08 KG/M2 | HEART RATE: 62 BPM

## 2021-03-03 DIAGNOSIS — M54.59 MECHANICAL LOW BACK PAIN: ICD-10-CM

## 2021-03-03 DIAGNOSIS — M54.16 LUMBAR RADICULOPATHY: ICD-10-CM

## 2021-03-03 DIAGNOSIS — M40.205 KYPHOSIS OF THORACOLUMBAR REGION, UNSPECIFIED KYPHOSIS TYPE: Primary | ICD-10-CM

## 2021-03-03 PROCEDURE — 99213 OFFICE O/P EST LOW 20 MIN: CPT | Performed by: NEUROLOGICAL SURGERY

## 2021-03-03 PROCEDURE — 3008F BODY MASS INDEX DOCD: CPT | Performed by: NEUROLOGICAL SURGERY

## 2021-03-03 PROCEDURE — 3074F SYST BP LT 130 MM HG: CPT | Performed by: NEUROLOGICAL SURGERY

## 2021-03-03 PROCEDURE — 3079F DIAST BP 80-89 MM HG: CPT | Performed by: NEUROLOGICAL SURGERY

## 2021-03-03 RX ORDER — TRAMADOL HYDROCHLORIDE 50 MG/1
50 TABLET ORAL
Qty: 60 TABLET | Refills: 0 | Status: SHIPPED | OUTPATIENT
Start: 2021-03-03

## 2021-03-03 NOTE — PROGRESS NOTES
Patient here for follow-up for low back pain. States back pain is still the same, no better or worse. Located in middle lower back, radiates up to neck. At best, pain rated 3-4/10. At worst, rated 10/10.  Aggravated with bending at waist. Pain feels like

## 2021-03-03 NOTE — PROGRESS NOTES
[facility]  Neurological Surgery Follow Up Clinic Note    Frank Echevarria 62year old, female    1960 MRN [id number]   PCP Sara Connors MD Allergies No Known Allergies     SUBJECTIVE:  Prabhu Santos is here with her  to [facility] stenosis. L3-L4: No disc herniation, spinal canal or neuroforaminal stenosis. L4-L5:  Minimal disc bulge without spinal canal or neural foraminal stenosis. L5-S1:  Minimal left paracentral disc bulge is noted.   No spinal canal or neural foraminal stenos

## 2021-03-22 ENCOUNTER — APPOINTMENT (OUTPATIENT)
Dept: GENERAL RADIOLOGY | Facility: HOSPITAL | Age: 61
End: 2021-03-22
Attending: PHYSICIAN ASSISTANT
Payer: COMMERCIAL

## 2021-03-22 ENCOUNTER — HOSPITAL ENCOUNTER (EMERGENCY)
Facility: HOSPITAL | Age: 61
Discharge: HOME | End: 2021-03-22
Attending: EMERGENCY MEDICINE
Payer: COMMERCIAL

## 2021-03-22 VITALS
WEIGHT: 120 LBS | RESPIRATION RATE: 16 BRPM | HEIGHT: 61 IN | TEMPERATURE: 97 F | BODY MASS INDEX: 22.66 KG/M2 | OXYGEN SATURATION: 97 % | SYSTOLIC BLOOD PRESSURE: 155 MMHG | DIASTOLIC BLOOD PRESSURE: 85 MMHG | HEART RATE: 76 BPM

## 2021-03-22 DIAGNOSIS — S93.402A MILD SPRAIN OF LEFT ANKLE, INITIAL ENCOUNTER: Primary | ICD-10-CM

## 2021-03-22 PROCEDURE — 73610 X-RAY EXAM OF ANKLE: CPT | Performed by: PHYSICIAN ASSISTANT

## 2021-03-22 PROCEDURE — 99283 EMERGENCY DEPT VISIT LOW MDM: CPT

## 2021-03-22 PROCEDURE — 73590 X-RAY EXAM OF LOWER LEG: CPT | Performed by: PHYSICIAN ASSISTANT

## 2021-03-22 PROCEDURE — 99284 EMERGENCY DEPT VISIT MOD MDM: CPT

## 2021-03-22 RX ORDER — IBUPROFEN 600 MG/1
600 TABLET ORAL EVERY 8 HOURS PRN
Qty: 30 TABLET | Refills: 0 | Status: SHIPPED | OUTPATIENT
Start: 2021-03-22 | End: 2021-03-29

## 2021-03-22 RX ORDER — IBUPROFEN 600 MG/1
600 TABLET ORAL ONCE
Status: COMPLETED | OUTPATIENT
Start: 2021-03-22 | End: 2021-03-22

## 2021-03-23 NOTE — ED PROVIDER NOTES
Patient Seen in: [facility] Emergency Department      History   Patient presents with:  Leg or Foot Injury    Stated Complaint: left ankle injury    HPI/Subjective:   HPI    55-year-old female who comes in today with her  stating that she acc • DILATION/CURETTAGE,DIAGNOSTIC  2003   • ESOPHAGOGASTRODUODENOSCOPY (EGD) N/A 7/27/2018    Performed by Isela Henderson MD at [facility] ENDOSCOPY   • MIDURETHRAL SLING     • REMOVAL GALLBLADDER                  Social History    Tobacco Use      Smoking st COMPARISON:  None. INDICATIONS:  left ankle injury  PATIENT STATED HISTORY: (As transcribed by Technologist)     FINDINGS:  No acute fractures or osseous lesions are identified. Ankle mortise is intact.   Well corticated ossified density adjacent to the m diagnosis)    Disposition:  Discharge  3/22/2021  8:55 pm    Follow-up:  MD Bright Betancourt Alberto De Tyron [phone]    Schedule an appointment as soon as possible for a visit  If symptoms worsen          Medicatio

## 2021-03-29 RX ORDER — MELOXICAM 7.5 MG/1
TABLET ORAL
Qty: 30 TABLET | Refills: 0 | Status: SHIPPED | OUTPATIENT
Start: 2021-03-29

## 2021-03-29 NOTE — TELEPHONE ENCOUNTER
Medication: Meloxicam    Date of last refill: 2/25/21  [place]      Last office visit: 3/3/21  Due back to clinic per last office note:  She will follow with us in 4 weeks time to monitor her progress.   Date next office

## 2021-04-02 ENCOUNTER — HOSPITAL ENCOUNTER (OUTPATIENT)
Dept: GENERAL RADIOLOGY | Facility: HOSPITAL | Age: 61
Discharge: HOME | End: 2021-04-02
Attending: INTERNAL MEDICINE
Payer: COMMERCIAL

## 2021-04-02 DIAGNOSIS — T17.208A: ICD-10-CM

## 2021-04-02 PROCEDURE — 70360 X-RAY EXAM OF NECK: CPT | Performed by: INTERNAL MEDICINE

## 2021-04-07 ENCOUNTER — OFFICE VISIT (OUTPATIENT)
Dept: OBGYN CLINIC | Facility: CLINIC | Age: 61
End: 2021-04-07
Payer: COMMERCIAL

## 2021-04-07 VITALS
SYSTOLIC BLOOD PRESSURE: 124 MMHG | BODY MASS INDEX: 23.37 KG/M2 | WEIGHT: 127 LBS | DIASTOLIC BLOOD PRESSURE: 86 MMHG | HEIGHT: 62 IN | HEART RATE: 72 BPM

## 2021-04-07 DIAGNOSIS — Z87.440 HISTORY OF UTI: ICD-10-CM

## 2021-04-07 DIAGNOSIS — Z12.31 ENCOUNTER FOR SCREENING MAMMOGRAM FOR BREAST CANCER: ICD-10-CM

## 2021-04-07 DIAGNOSIS — Z12.4 CERVICAL CANCER SCREENING: ICD-10-CM

## 2021-04-07 DIAGNOSIS — Z01.419 WELL WOMAN EXAM WITH ROUTINE GYNECOLOGICAL EXAM: Primary | ICD-10-CM

## 2021-04-07 PROCEDURE — 87086 URINE CULTURE/COLONY COUNT: CPT | Performed by: NURSE PRACTITIONER

## 2021-04-07 PROCEDURE — 87624 HPV HI-RISK TYP POOLED RSLT: CPT | Performed by: NURSE PRACTITIONER

## 2021-04-07 PROCEDURE — 99396 PREV VISIT EST AGE 40-64: CPT | Performed by: NURSE PRACTITIONER

## 2021-04-07 PROCEDURE — 3008F BODY MASS INDEX DOCD: CPT | Performed by: NURSE PRACTITIONER

## 2021-04-07 PROCEDURE — 88175 CYTOPATH C/V AUTO FLUID REDO: CPT | Performed by: NURSE PRACTITIONER

## 2021-04-07 PROCEDURE — 3079F DIAST BP 80-89 MM HG: CPT | Performed by: NURSE PRACTITIONER

## 2021-04-07 PROCEDURE — 3074F SYST BP LT 130 MM HG: CPT | Performed by: NURSE PRACTITIONER

## 2021-04-07 PROCEDURE — 81002 URINALYSIS NONAUTO W/O SCOPE: CPT | Performed by: NURSE PRACTITIONER

## 2021-04-07 RX ORDER — AZITHROMYCIN 250 MG/1
250 TABLET, FILM COATED ORAL DAILY
COMMUNITY
Start: 2021-03-18

## 2021-04-07 RX ORDER — ESCITALOPRAM OXALATE 10 MG/1
10 TABLET ORAL DAILY
COMMUNITY
Start: 2021-03-26

## 2021-04-07 RX ORDER — METHYLPREDNISOLONE 4 MG/1
TABLET ORAL
COMMUNITY
Start: 2021-03-18 | End: 2021-09-06

## 2021-04-07 NOTE — PROGRESS NOTES
Here for Routine Annual Exam-  is here accompanied by an . No concerns, Vit E and Evening Primrose has improved breast pain significantly  Menses are absent.   She has a history of UTI and wants a urine culture- she was hospitaliz

## 2021-04-20 ENCOUNTER — TELEPHONE (OUTPATIENT)
Dept: PHYSICAL THERAPY | Facility: HOSPITAL | Age: 61
End: 2021-04-20

## 2021-04-21 ENCOUNTER — OFFICE VISIT (OUTPATIENT)
Dept: PHYSICAL THERAPY | Facility: HOSPITAL | Age: 61
End: 2021-04-21
Attending: NEUROLOGICAL SURGERY
Payer: COMMERCIAL

## 2021-04-21 DIAGNOSIS — M54.59 MECHANICAL LOW BACK PAIN: ICD-10-CM

## 2021-04-21 DIAGNOSIS — M54.16 LUMBAR RADICULOPATHY: ICD-10-CM

## 2021-04-21 DIAGNOSIS — M40.205 KYPHOSIS OF THORACOLUMBAR REGION, UNSPECIFIED KYPHOSIS TYPE: ICD-10-CM

## 2021-04-21 PROCEDURE — 97110 THERAPEUTIC EXERCISES: CPT

## 2021-04-21 PROCEDURE — 97162 PT EVAL MOD COMPLEX 30 MIN: CPT

## 2021-04-21 NOTE — PROGRESS NOTES
SPINE EVALUATION:   Referring Physician: Dr. Maurizio Jaimes  Diagnosis:   Kyphosis of TL region, mechanical back pain, lumbar radiculopathy.    Date of Service: 4/21/2021     PATIENT SUMMARY   Eden Burnett is a 62year old female who presents to therapy today with consistent with diagnosis of back pain, left lumbar radiculopathy. Pt and PT discussed evaluation findings, pathology, POC and HEP. Pt voiced understanding and performs HEP correctly without reported pain.  Skilled Physical Therapy is medically necessary t seated sciatic tensioners. Charges: PT Eval Moderate Complexity, Therex1.       Total Timed Treatment: 12 min     Total Treatment Time: 40 min     Based on clinical rationale and outcome measures, this evaluation involved Moderate Complexity decision ma

## 2021-04-23 ENCOUNTER — OFFICE VISIT (OUTPATIENT)
Dept: PHYSICAL THERAPY | Facility: HOSPITAL | Age: 61
End: 2021-04-23
Attending: NEUROLOGICAL SURGERY
Payer: COMMERCIAL

## 2021-04-23 PROCEDURE — 97110 THERAPEUTIC EXERCISES: CPT

## 2021-04-23 PROCEDURE — 97112 NEUROMUSCULAR REEDUCATION: CPT

## 2021-04-23 NOTE — PROGRESS NOTES
Dx:  Thoracolumbar kyphosis, mechanical low back pain, lumbar radiculopathy.           Insurance (Authorized # of Visits):  6           Authorizing Physician: Dr. Danielito Rosado  Next MD visit: none scheduled  Fall Risk: standard         Precautions: n/a glides x 10 each leg. Bent leg fall-outs x 10 l/r. Oneal Claude HEP: 4 point draw-in, 4 point rocking, seated sciatic nerve glides. Charges: Rosiland Carrel.        Total Timed Treatment: 42 min  Total Treatment Time: 42 min

## 2021-04-28 ENCOUNTER — OFFICE VISIT (OUTPATIENT)
Dept: PHYSICAL THERAPY | Facility: HOSPITAL | Age: 61
End: 2021-04-28
Attending: NEUROLOGICAL SURGERY
Payer: COMMERCIAL

## 2021-04-28 PROCEDURE — 97112 NEUROMUSCULAR REEDUCATION: CPT

## 2021-04-28 PROCEDURE — 97110 THERAPEUTIC EXERCISES: CPT

## 2021-04-28 NOTE — PROGRESS NOTES
Dx:  Thoracolumbar kyphosis, mechanical low back pain, lumbar radiculopathy.           Insurance (Authorized # of Visits):  6           Authorizing Physician: Dr. Danielito Rosado  Next MD visit: none scheduled  Fall Risk: standard         Precautions: n/a knees 3 x 10. With blue band around knees x 20 reps. Ottie Clack PPT while hook lying with ball squeezing 5 second holds x 10 reps. . X 10 reps. .      stm t-l paraspinal mm and left piriformis muscle by PT for 5 minutes. X.      Prone hip PROM by PT.   Prone hip

## 2021-04-30 ENCOUNTER — OFFICE VISIT (OUTPATIENT)
Dept: PHYSICAL THERAPY | Facility: HOSPITAL | Age: 61
End: 2021-04-30
Attending: NEUROLOGICAL SURGERY
Payer: COMMERCIAL

## 2021-04-30 PROCEDURE — 97112 NEUROMUSCULAR REEDUCATION: CPT

## 2021-04-30 PROCEDURE — 97110 THERAPEUTIC EXERCISES: CPT

## 2021-04-30 NOTE — PROGRESS NOTES
Dx:  Thoracolumbar kyphosis, mechanical low back pain, lumbar radiculopathy.           Insurance (Authorized # of Visits):  6           Authorizing Physician: Dr. Ellie Shepherd MD visit: none scheduled  Fall Risk: standard         Precautions: n/a SB 2 x 10. DKTC with SB x 20. LTR with calves on SB 2 x 10 l/r. Eddye Cozier LTR with calves on SB 2 x 10 l/r. Eddye Cozier LTR with SB x 20 l/r in comfortable range. Piriformis stretch with SB assist by PT for 30 seconds x 2 each leg.   X. Piriformis stretch with S

## 2021-05-05 ENCOUNTER — OFFICE VISIT (OUTPATIENT)
Dept: PHYSICAL THERAPY | Facility: HOSPITAL | Age: 61
End: 2021-05-05
Attending: NEUROLOGICAL SURGERY
Payer: COMMERCIAL

## 2021-05-05 PROCEDURE — 97112 NEUROMUSCULAR REEDUCATION: CPT

## 2021-05-05 PROCEDURE — 97110 THERAPEUTIC EXERCISES: CPT

## 2021-05-05 NOTE — PROGRESS NOTES
Dx:  Thoracolumbar kyphosis, mechanical low back pain, lumbar radiculopathy.           Insurance (Authorized # of Visits):  6           Authorizing Physician: Dr. Rachel Clark  Next MD visit: none scheduled  Fall Risk: standard         Precautions: n/a Piriformis stretch with SB assist by PT for 30 seconds x 2 each leg. X. Piriformis stretch with SB assist for 30 seconds x 2 left and right. For 30 seconds x 2 each leg. Supine hip PROM by PT l/r.. Supine l/r hip PROM by PT.   Supine l/r hip PROM

## 2021-05-07 ENCOUNTER — OFFICE VISIT (OUTPATIENT)
Dept: PHYSICAL THERAPY | Facility: HOSPITAL | Age: 61
End: 2021-05-07
Attending: NEUROLOGICAL SURGERY
Payer: COMMERCIAL

## 2021-05-07 PROCEDURE — 97110 THERAPEUTIC EXERCISES: CPT

## 2021-05-07 PROCEDURE — 97112 NEUROMUSCULAR REEDUCATION: CPT

## 2021-05-07 NOTE — PROGRESS NOTES
Dx:  Thoracolumbar kyphosis, mechanical low back pain, lumbar radiculopathy.           Insurance (Authorized # of Visits):  6           Authorizing Physician: Dr. Liana Mahajan  Next MD visit: none scheduled  Fall Risk: standard         Precautions: n/a Piriformis stretch with SB assist by PT for 30 seconds x 2 each leg. X. Piriformis stretch with SB assist for 30 seconds x 2 left and right. For 30 seconds x 2 each leg. Piriformis stretch with SB assist for 30 seconds x 2 left and right.       Supine l/r..    While right side lying:   L psoas stretch by PT.   L clam shells x 10 reps. Jose Antonio Parrish HSS while hook lying with ankle pumps x 20 l/r. .  Right side lying:    L psoas stretch by PT.   L clam shells x 10 reps. Jose Antonio Parrish      HEP: 4 point draw-in, 4 point rocking, seate

## 2021-05-12 ENCOUNTER — OFFICE VISIT (OUTPATIENT)
Dept: PHYSICAL THERAPY | Facility: HOSPITAL | Age: 61
End: 2021-05-12
Attending: NEUROLOGICAL SURGERY
Payer: COMMERCIAL

## 2021-05-12 PROCEDURE — 97110 THERAPEUTIC EXERCISES: CPT

## 2021-05-12 PROCEDURE — 97112 NEUROMUSCULAR REEDUCATION: CPT

## 2021-05-12 NOTE — PROGRESS NOTES
Dx:  Thoracolumbar kyphosis, mechanical low back pain, lumbar radiculopathy.           Insurance (Authorized # of Visits):  6           Authorizing Physician: Dr. Liana Mahajan  Next MD visit: none scheduled  Fall Risk: standard         Precautions: n/a with SB x 25. LTR with calves on SB 2 x 10 l/r. Royann Shelia LTR with calves on SB 2 x 10 l/r. Royann Shelia LTR with SB x 20 l/r in comfortable range. X 20 l/r. Royann Shelia LTR with SB x 20 l/r. Royann Shelia LTR with calves on SB x 20 l/r. Royann Shelia     Piriformis stretch with SB assist by PT for 30 seconds sciatic nerve glides x 15 each leg. Seated sciatic tensioners x 15 l/r. Jv Chafe Seated sciatic tensioners x 20 l/r. Jv Chafe Seated sciatic tensioners 2 x 10 l/r. Jv Chafe 2 x 10 each leg. Bent leg fall-outs x 10 l/r. Jv Chafe 4 point rocking x 10 reps with cueing prn. . 4 point:

## 2021-05-14 ENCOUNTER — OFFICE VISIT (OUTPATIENT)
Dept: PHYSICAL THERAPY | Facility: HOSPITAL | Age: 61
End: 2021-05-14
Attending: NEUROLOGICAL SURGERY
Payer: COMMERCIAL

## 2021-05-14 PROCEDURE — 97112 NEUROMUSCULAR REEDUCATION: CPT

## 2021-05-14 PROCEDURE — 97110 THERAPEUTIC EXERCISES: CPT

## 2021-05-14 NOTE — PROGRESS NOTES
Dx:  Thoracolumbar kyphosis, mechanical low back pain, lumbar radiculopathy.           Insurance (Authorized # of Visits):  6           Authorizing Physician: Dr. Bailee Hinson  Next MD visit: none scheduled  Fall Risk: standard         Precautions: n/a reciprocally without pain or difficulty. Progress. Pt will report being pain-free when picking up items from the floor. Progress. Pt will be independent with an upgraded HEP. Met.       Date: 4/23/2021  TX#: 2/8 Date: 4/28/21                TX#: 3/8 Walter 10.    PPT while hook lying with ball squeezing 5 second holds x 10 reps. . X 10 reps. Castellon Cliche PPT while hook lying with ball squeezing 5 second holds x 12 reps. Castellon Cliche PPT while hook lying with ball squeezing 5 second holds x 15 reps. Castellon Cliche  PPT while hook lying with ball sq shells 2 x 10. X 20 l/r. Jv Buchanan HEP was discussed and questions were answered. HEP: 4 point draw-in, 4 point rocking, seated sciatic nerve glides. Charges: Gareth Meneses.        Total Timed Treatment: 43 min  Total Treatment Time: 43 min

## 2021-09-06 ENCOUNTER — APPOINTMENT (OUTPATIENT)
Dept: MRI IMAGING | Facility: HOSPITAL | Age: 61
End: 2021-09-06
Attending: EMERGENCY MEDICINE
Payer: COMMERCIAL

## 2021-09-06 ENCOUNTER — HOSPITAL ENCOUNTER (EMERGENCY)
Facility: HOSPITAL | Age: 61
Discharge: HOME | End: 2021-09-06
Attending: EMERGENCY MEDICINE
Payer: COMMERCIAL

## 2021-09-06 VITALS
WEIGHT: 123 LBS | HEART RATE: 67 BPM | RESPIRATION RATE: 16 BRPM | SYSTOLIC BLOOD PRESSURE: 136 MMHG | OXYGEN SATURATION: 98 % | DIASTOLIC BLOOD PRESSURE: 90 MMHG | TEMPERATURE: 98 F | BODY MASS INDEX: 23.22 KG/M2 | HEIGHT: 61 IN

## 2021-09-06 DIAGNOSIS — R20.0 LEFT FACIAL NUMBNESS: Primary | ICD-10-CM

## 2021-09-06 LAB
ALBUMIN SERPL-MCNC: 3.5 G/DL (ref 3.4–5)
ALBUMIN/GLOB SERPL: 1 {RATIO} (ref 1–2)
ALP LIVER SERPL-CCNC: 86 U/L
ALT SERPL-CCNC: 24 U/L
ANION GAP SERPL CALC-SCNC: 5 MMOL/L (ref 0–18)
AST SERPL-CCNC: 24 U/L (ref 15–37)
BASOPHILS # BLD AUTO: 0.05 X10(3) UL (ref 0–0.2)
BASOPHILS NFR BLD AUTO: 0.6 %
BILIRUB SERPL-MCNC: 0.4 MG/DL (ref 0.1–2)
BUN BLD-MCNC: 17 MG/DL (ref 7–18)
CALCIUM BLD-MCNC: 8.6 MG/DL (ref 8.5–10.1)
CHLORIDE SERPL-SCNC: 106 MMOL/L (ref 98–112)
CO2 SERPL-SCNC: 30 MMOL/L (ref 21–32)
CREAT BLD-MCNC: 0.71 MG/DL
EOSINOPHIL # BLD AUTO: 0.12 X10(3) UL (ref 0–0.7)
EOSINOPHIL NFR BLD AUTO: 1.4 %
ERYTHROCYTE [DISTWIDTH] IN BLOOD BY AUTOMATED COUNT: 13.5 %
GLOBULIN PLAS-MCNC: 3.4 G/DL (ref 2.8–4.4)
GLUCOSE BLD-MCNC: 88 MG/DL (ref 70–99)
HCT VFR BLD AUTO: 41.1 %
HGB BLD-MCNC: 13.7 G/DL
IMM GRANULOCYTES # BLD AUTO: 0.02 X10(3) UL (ref 0–1)
IMM GRANULOCYTES NFR BLD: 0.2 %
LYMPHOCYTES # BLD AUTO: 2.47 X10(3) UL (ref 1–4)
LYMPHOCYTES NFR BLD AUTO: 27.9 %
M PROTEIN MFR SERPL ELPH: 6.9 G/DL (ref 6.4–8.2)
MCH RBC QN AUTO: 28.7 PG (ref 26–34)
MCHC RBC AUTO-ENTMCNC: 33.3 G/DL (ref 31–37)
MCV RBC AUTO: 86.2 FL
MONOCYTES # BLD AUTO: 0.57 X10(3) UL (ref 0.1–1)
MONOCYTES NFR BLD AUTO: 6.4 %
NEUTROPHILS # BLD AUTO: 5.62 X10 (3) UL (ref 1.5–7.7)
NEUTROPHILS # BLD AUTO: 5.62 X10(3) UL (ref 1.5–7.7)
NEUTROPHILS NFR BLD AUTO: 63.5 %
OSMOLALITY SERPL CALC.SUM OF ELEC: 293 MOSM/KG (ref 275–295)
PLATELET # BLD AUTO: 262 10(3)UL (ref 150–450)
POTASSIUM SERPL-SCNC: 3.5 MMOL/L (ref 3.5–5.1)
RBC # BLD AUTO: 4.77 X10(6)UL
SARS-COV-2 RNA RESP QL NAA+PROBE: NOT DETECTED
SODIUM SERPL-SCNC: 141 MMOL/L (ref 136–145)
TROPONIN I SERPL-MCNC: <0.045 NG/ML (ref ?–0.04)
WBC # BLD AUTO: 8.9 X10(3) UL (ref 4–11)

## 2021-09-06 PROCEDURE — 80053 COMPREHEN METABOLIC PANEL: CPT

## 2021-09-06 PROCEDURE — 93005 ELECTROCARDIOGRAM TRACING: CPT

## 2021-09-06 PROCEDURE — 70551 MRI BRAIN STEM W/O DYE: CPT | Performed by: EMERGENCY MEDICINE

## 2021-09-06 PROCEDURE — 85025 COMPLETE CBC W/AUTO DIFF WBC: CPT | Performed by: EMERGENCY MEDICINE

## 2021-09-06 PROCEDURE — 84484 ASSAY OF TROPONIN QUANT: CPT | Performed by: EMERGENCY MEDICINE

## 2021-09-06 PROCEDURE — 99284 EMERGENCY DEPT VISIT MOD MDM: CPT

## 2021-09-06 PROCEDURE — 36415 COLL VENOUS BLD VENIPUNCTURE: CPT

## 2021-09-06 PROCEDURE — 85025 COMPLETE CBC W/AUTO DIFF WBC: CPT

## 2021-09-06 PROCEDURE — 80053 COMPREHEN METABOLIC PANEL: CPT | Performed by: EMERGENCY MEDICINE

## 2021-09-06 PROCEDURE — 93010 ELECTROCARDIOGRAM REPORT: CPT

## 2021-09-06 RX ORDER — VALACYCLOVIR HYDROCHLORIDE 1 G/1
1 TABLET, FILM COATED ORAL 3 TIMES DAILY
Qty: 21 TABLET | Refills: 0 | Status: SHIPPED | OUTPATIENT
Start: 2021-09-06 | End: 2021-09-13

## 2021-09-06 RX ORDER — PREDNISONE 20 MG/1
40 TABLET ORAL DAILY
Qty: 10 TABLET | Refills: 0 | Status: SHIPPED | OUTPATIENT
Start: 2021-09-06 | End: 2021-09-11

## 2021-09-06 NOTE — ED INITIAL ASSESSMENT (HPI)
Patient with c/o mouth numbness, left sided facial numbness and left shoulder and arm pain for three days.

## 2021-09-06 NOTE — ED PROVIDER NOTES
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 Normoactive bowel sounds. No rebound. No guarding. EXTREMITIES: Warm with brisk capillary refill. Neuro: Speech clear.   Flattening of the left nasolabial fold and drooping of the corner of the left side of m STATED HISTORY: (As transcribed by Technologist)  Patient complains of mouth numbness, left sided facial numbness and left shoulder and arm pain for three days. FINDINGS:  INTRACRANIAL:  There is no acute infarct. There is no hemorrhage or mass.   There facial numbness  (primary encounter diagnosis)     Disposition:  Discharge  9/6/2021  7:01 pm    Follow-up:  Mika Porras MD  [address] [facility]  [phone]    On 9/7/2021      Leanna Peck MD  [facility]

## 2021-09-07 LAB
ATRIAL RATE: 73 BPM
P AXIS: 64 DEGREES
P-R INTERVAL: 160 MS
Q-T INTERVAL: 398 MS
QRS DURATION: 80 MS
QTC CALCULATION (BEZET): 438 MS
R AXIS: 57 DEGREES
T AXIS: 52 DEGREES
VENTRICULAR RATE: 73 BPM

## 2021-09-29 ENCOUNTER — OFFICE VISIT (OUTPATIENT)
Dept: OBGYN CLINIC | Facility: CLINIC | Age: 61
End: 2021-09-29
Payer: COMMERCIAL

## 2021-09-29 VITALS
WEIGHT: 127 LBS | HEIGHT: 62 IN | BODY MASS INDEX: 23.37 KG/M2 | SYSTOLIC BLOOD PRESSURE: 138 MMHG | DIASTOLIC BLOOD PRESSURE: 70 MMHG

## 2021-09-29 DIAGNOSIS — R39.15 URGENCY OF MICTURITION: ICD-10-CM

## 2021-09-29 DIAGNOSIS — R39.9 UTI SYMPTOMS: ICD-10-CM

## 2021-09-29 DIAGNOSIS — R10.32 ABDOMINAL PAIN, LEFT LOWER QUADRANT: ICD-10-CM

## 2021-09-29 DIAGNOSIS — N81.4 UTEROVAGINAL PROLAPSE: Primary | ICD-10-CM

## 2021-09-29 LAB
APPEARANCE: CLEAR
BILIRUBIN: NEGATIVE
GLUCOSE (URINE DIPSTICK): NEGATIVE MG/DL
LEUKOCYTES: NEGATIVE
MULTISTIX LOT#: ABNORMAL NUMERIC
NITRITE, URINE: NEGATIVE
PH, URINE: 5.5 (ref 4.5–8)
PROTEIN (URINE DIPSTICK): NEGATIVE MG/DL
SPECIFIC GRAVITY: 1.03 (ref 1–1.03)
UROBILINOGEN,SEMI-QN: 0.2 MG/DL (ref 0–1.9)

## 2021-09-29 PROCEDURE — 87086 URINE CULTURE/COLONY COUNT: CPT | Performed by: OBSTETRICS & GYNECOLOGY

## 2021-09-29 PROCEDURE — 87660 TRICHOMONAS VAGIN DIR PROBE: CPT | Performed by: OBSTETRICS & GYNECOLOGY

## 2021-09-29 PROCEDURE — 3008F BODY MASS INDEX DOCD: CPT | Performed by: OBSTETRICS & GYNECOLOGY

## 2021-09-29 PROCEDURE — 81002 URINALYSIS NONAUTO W/O SCOPE: CPT | Performed by: OBSTETRICS & GYNECOLOGY

## 2021-09-29 PROCEDURE — 3075F SYST BP GE 130 - 139MM HG: CPT | Performed by: OBSTETRICS & GYNECOLOGY

## 2021-09-29 PROCEDURE — 87510 GARDNER VAG DNA DIR PROBE: CPT | Performed by: OBSTETRICS & GYNECOLOGY

## 2021-09-29 PROCEDURE — 87480 CANDIDA DNA DIR PROBE: CPT | Performed by: OBSTETRICS & GYNECOLOGY

## 2021-09-29 PROCEDURE — 99214 OFFICE O/P EST MOD 30 MIN: CPT | Performed by: OBSTETRICS & GYNECOLOGY

## 2021-09-29 PROCEDURE — 3078F DIAST BP <80 MM HG: CPT | Performed by: OBSTETRICS & GYNECOLOGY

## 2021-09-29 NOTE — PROGRESS NOTES
[facility]  Obstetrics and Gynecology  Follow Up Progress Note    Subjective:     Diaz Christensen is a 62year old [id number] female who was last seen in office 04/2021 for well woman exam and presents with c/o uterovaginal prolapse.  The patient reports Tab, Take 10 mg by mouth daily. , Disp: , Rfl:   traMADol HCl 50 MG Oral Tab, Take 1 tablet (50 mg total) by mouth every 6 (six) hours as needed for Pain., Disp: 20 tablet, Rfl: 0  Meclizine HCl 25 MG Oral Tab, Take 1 tablet (25 mg total) by mouth 3 (three) anomolous origin noted on coronary CTA 1/2014/ never any symptoms   • Unspecified essential hypertension    • UTI (urinary tract infection)     frequent  and current   • Visual impairment     glasses   • Wears glasses        PSH:  Past Surgical History: patient regarding the management of uterovaginal prolapse including referral to urogynecology  Discussion with patient that she may be a candidate for pessary versus surgical management  Patient stated that she might be interested in surgical management  T

## 2021-10-12 ENCOUNTER — OFFICE VISIT (OUTPATIENT)
Dept: NEUROLOGY | Facility: CLINIC | Age: 61
End: 2021-10-12
Payer: COMMERCIAL

## 2021-10-12 VITALS — HEART RATE: 70 BPM | SYSTOLIC BLOOD PRESSURE: 128 MMHG | RESPIRATION RATE: 16 BRPM | DIASTOLIC BLOOD PRESSURE: 76 MMHG

## 2021-10-12 DIAGNOSIS — M54.12 LEFT CERVICAL RADICULOPATHY: ICD-10-CM

## 2021-10-12 DIAGNOSIS — R20.0 LEFT FACIAL NUMBNESS: Primary | ICD-10-CM

## 2021-10-12 PROCEDURE — 3074F SYST BP LT 130 MM HG: CPT | Performed by: OTHER

## 2021-10-12 PROCEDURE — 99244 OFF/OP CNSLTJ NEW/EST MOD 40: CPT | Performed by: OTHER

## 2021-10-12 PROCEDURE — 3078F DIAST BP <80 MM HG: CPT | Performed by: OTHER

## 2021-10-12 RX ORDER — GABAPENTIN 300 MG/1
300 CAPSULE ORAL NIGHTLY
Qty: 30 CAPSULE | Refills: 1 | Status: SHIPPED | OUTPATIENT
Start: 2021-10-12 | End: 2021-11-11

## 2021-10-12 NOTE — PROGRESS NOTES
Patient here for evaluation of left facial numbness. Was in ER recently. Numbness has gotten better since being in ER but still having pain on left side.

## 2021-10-12 NOTE — PROGRESS NOTES
[facility]Y CONSULTATION    Date of consult: 10/12/2021    CC/Reason for consult: paresthesia/left craniofacial pain  Consult Requested by ER  HPI: Christian Khalil is a 62year old female with past medical history as listed below presents here for traMADol HCl (ULTRAM) 50 MG Oral Tab, Take 1 tablet (50 mg total) by mouth every 4 to 6 hours as needed for Pain., Disp: 60 tablet, Rfl: 0  •  cyclobenzaprine 10 MG Oral Tab, Take 1 tablet (10 mg total) by mouth 3 (three) times daily as needed for Muscle s unspecified(486) 2009   • PONV (postoperative nausea and vomiting)    • Psychogenic syncope    • Shortness of breath    • Stress    • Stroke [facility])     mini stroke- 2017   • TIA (transient ischemic attack)    • Uncomfortable fullness after meals    • Unspec thyromegaly  Neck: Supple; no carotid bruits  Cardiac: Regular rate and regular rhythm  Lungs: Clear to auscultation bilaterally  Abdomen: Soft, non-tender  Extremities: No clubbing or cyanosis; moves extremities   Psychiatric: Normal mood and affect; answ and contact office     Zara Pettit) Jennifer Perry MD   Neurology  [facility]  10/12/2021, 3:58 PM  Consultation Report: being sent/fax/route to requesting provider   CC: Mitch Russ MD

## 2021-10-20 ENCOUNTER — ULTRASOUND ENCOUNTER (OUTPATIENT)
Dept: OBGYN CLINIC | Facility: CLINIC | Age: 61
End: 2021-10-20
Payer: COMMERCIAL

## 2021-10-20 DIAGNOSIS — R10.32 ABDOMINAL PAIN, LEFT LOWER QUADRANT: Primary | ICD-10-CM

## 2021-10-20 PROCEDURE — 76856 US EXAM PELVIC COMPLETE: CPT | Performed by: OBSTETRICS & GYNECOLOGY

## 2021-10-20 PROCEDURE — 76830 TRANSVAGINAL US NON-OB: CPT | Performed by: OBSTETRICS & GYNECOLOGY

## 2021-10-29 ENCOUNTER — HOSPITAL ENCOUNTER (OUTPATIENT)
Dept: MRI IMAGING | Facility: HOSPITAL | Age: 61
Discharge: HOME | End: 2021-10-29
Attending: Other
Payer: COMMERCIAL

## 2021-10-29 DIAGNOSIS — R20.0 LEFT FACIAL NUMBNESS: ICD-10-CM

## 2021-10-29 DIAGNOSIS — M54.12 LEFT CERVICAL RADICULOPATHY: ICD-10-CM

## 2021-10-29 PROCEDURE — 72156 MRI NECK SPINE W/O & W/DYE: CPT | Performed by: OTHER

## 2021-10-29 PROCEDURE — A9575 INJ GADOTERATE MEGLUMI 0.1ML: HCPCS | Performed by: OTHER

## 2021-10-29 PROCEDURE — 70543 MRI ORBT/FAC/NCK W/O &W/DYE: CPT | Performed by: OTHER

## 2021-11-02 ENCOUNTER — OFFICE VISIT (OUTPATIENT)
Dept: UROLOGY | Facility: HOSPITAL | Age: 61
End: 2021-11-02
Attending: OBSTETRICS & GYNECOLOGY
Payer: COMMERCIAL

## 2021-11-02 ENCOUNTER — TELEPHONE (OUTPATIENT)
Dept: NEUROLOGY | Facility: CLINIC | Age: 61
End: 2021-11-02

## 2021-11-02 VITALS — BODY MASS INDEX: 24 KG/M2 | WEIGHT: 126 LBS | RESPIRATION RATE: 16 BRPM | TEMPERATURE: 97 F

## 2021-11-02 DIAGNOSIS — M48.02 CERVICAL SPINAL STENOSIS: Primary | ICD-10-CM

## 2021-11-02 DIAGNOSIS — N94.10 DYSPAREUNIA, FEMALE: ICD-10-CM

## 2021-11-02 DIAGNOSIS — N81.2 UTEROVAGINAL PROLAPSE, INCOMPLETE: Primary | ICD-10-CM

## 2021-11-02 DIAGNOSIS — N95.2 POSTMENOPAUSAL ATROPHIC VAGINITIS: ICD-10-CM

## 2021-11-02 DIAGNOSIS — N81.84 PELVIC MUSCLE WASTING: ICD-10-CM

## 2021-11-02 DIAGNOSIS — R35.1 NOCTURIA: ICD-10-CM

## 2021-11-02 PROCEDURE — 99212 OFFICE O/P EST SF 10 MIN: CPT

## 2021-11-02 PROCEDURE — 81002 URINALYSIS NONAUTO W/O SCOPE: CPT | Performed by: OBSTETRICS & GYNECOLOGY

## 2021-11-02 NOTE — PROGRESS NOTES
Patient presents to follow up bulge    She is currently using vag estrogen twice weekly  Some nocturia x3  No ALISSA  No UUI    Bulge sx worse  Vague UTI sx    Considering options  C/o dyspareunia    Temp 97.4 °F (36.3 °C)   Resp 16   Wt 126 lb (57.2 kg)   BM teaching/pt education done  Estrace / Premarin cream  vag moisturizers, lubrication with coitus    Treatment Plan, Surgical:   Hysterectomy-Vag, Abd, LAVH, Laporascopic Sacrocolpoxy Hysterectomy, Total Laporoscopic Hysterectomy, Robotic  Risks/benefits of

## 2021-11-09 ENCOUNTER — HOSPITAL ENCOUNTER (OUTPATIENT)
Dept: MAMMOGRAPHY | Facility: HOSPITAL | Age: 61
Discharge: HOME | End: 2021-11-09
Attending: NURSE PRACTITIONER
Payer: COMMERCIAL

## 2021-11-09 DIAGNOSIS — Z12.31 ENCOUNTER FOR SCREENING MAMMOGRAM FOR BREAST CANCER: ICD-10-CM

## 2021-11-09 PROCEDURE — 77063 BREAST TOMOSYNTHESIS BI: CPT | Performed by: NURSE PRACTITIONER

## 2021-11-09 PROCEDURE — 77067 SCR MAMMO BI INCL CAD: CPT | Performed by: NURSE PRACTITIONER

## 2021-11-09 NOTE — TELEPHONE ENCOUNTER
LMTCB, second attempt. Called , Laverne Rodriguez (ok per HIPAA). Office hours and contact info provided.      Also has upcoming appt 11/11/21

## 2021-11-09 NOTE — TELEPHONE ENCOUNTER
Call transferred by [facility]. Relayed Dr. Dinora Palomino note below to , Regina Hernandez (ok per HIPAA). Can discuss further at upcoming appt this week.  verbalized understanding.

## 2021-11-11 ENCOUNTER — OFFICE VISIT (OUTPATIENT)
Dept: NEUROLOGY | Facility: CLINIC | Age: 61
End: 2021-11-11
Payer: COMMERCIAL

## 2021-11-11 VITALS
DIASTOLIC BLOOD PRESSURE: 70 MMHG | BODY MASS INDEX: 24 KG/M2 | WEIGHT: 126 LBS | HEART RATE: 70 BPM | SYSTOLIC BLOOD PRESSURE: 122 MMHG | RESPIRATION RATE: 16 BRPM

## 2021-11-11 DIAGNOSIS — M48.02 CERVICAL SPINAL STENOSIS: Primary | ICD-10-CM

## 2021-11-11 PROCEDURE — 99214 OFFICE O/P EST MOD 30 MIN: CPT | Performed by: OTHER

## 2021-11-11 PROCEDURE — 3078F DIAST BP <80 MM HG: CPT | Performed by: OTHER

## 2021-11-11 PROCEDURE — 3074F SYST BP LT 130 MM HG: CPT | Performed by: OTHER

## 2021-11-11 RX ORDER — GABAPENTIN 300 MG/1
300 CAPSULE ORAL NIGHTLY
Qty: 30 CAPSULE | Refills: 3 | Status: SHIPPED | OUTPATIENT
Start: 2021-11-11

## 2021-11-11 NOTE — PROGRESS NOTES
[facility] Neurology Outpatient Progress Note  Date of service: 11/11/2021    Patient here for a follow-up visit for paresthesia/dysethesia involving left lower face, left side of tongue, throat radiating to left neck/arm region.  Since last visit symptoms seem im Tab, Take 10 mg by mouth daily. , Disp: , Rfl:   •  azithromycin 250 MG Oral Tab, Take 250 mg by mouth daily. , Disp: , Rfl:   •  MELOXICAM 7.5 MG Oral Tab, TAKE 1 TABLET BY MOUTH EVERY DAY, Disp: 30 tablet, Rfl: 0  •  vitamin E 100 UNITS Oral Cap, Take 100 pressure     denies   • High cholesterol    • History of blood transfusion     10 yrs ago/ placenta accreda   • Indigestion    • Migraines    • Mouth sores    • Nausea    • Neuropathy     sometimes arms at night   • Other and unspecified hyperlipidemia Brother    • Heart Disorder Brother    • Hypertension Brother    • Hypertension Brother    • Lipids Brother    • Hypertension Brother    • Lipids Brother       Neurological Examination:  /70   Pulse 70   Resp 16   Wt 126 lb (57.2 kg)   BMI 23.81 kg/m

## 2021-11-24 ENCOUNTER — HOSPITAL ENCOUNTER (OUTPATIENT)
Dept: CT IMAGING | Facility: HOSPITAL | Age: 61
Discharge: HOME | End: 2021-11-24
Attending: INTERNAL MEDICINE
Payer: COMMERCIAL

## 2021-11-24 DIAGNOSIS — R10.32 LLQ PAIN: ICD-10-CM

## 2021-11-24 PROCEDURE — 82565 ASSAY OF CREATININE: CPT

## 2021-11-24 PROCEDURE — 74177 CT ABD & PELVIS W/CONTRAST: CPT | Performed by: INTERNAL MEDICINE

## 2022-01-24 ENCOUNTER — TELEPHONE (OUTPATIENT)
Dept: UROLOGY | Facility: HOSPITAL | Age: 62
End: 2022-01-24

## 2022-01-24 NOTE — TELEPHONE ENCOUNTER
TC from  asking if wife can travel after her surgery scheduled 3/14/22.  Advised that pt needs to have f/u appt w/ 2 weeks after surgery and pt will not be cleared to return to normal activities like lifting and swimming for at least 6 wk

## 2022-02-11 NOTE — PROGRESS NOTES
Location of Pain: Lower back , left leg    Date Pain Began: Since last year , flared two weeks          Work Related:   No        Receiving Work Comp/Disability:   No    Numeric Rating Scale:  Pain at Present:  8 Prenatal Care Visit    Subjective   Chief Complaint   Patient presents with   • Routine Prenatal Visit     Concerned about weight restrictions at work       History:   Abida is a  currently at 16w2d who presents for a prenatal care visit today.    No major issues.    Social History    Tobacco Use      Smoking status: Current Every Day Smoker        Types: Cigarettes      Smokeless tobacco: Never Used       Objective   /56   Wt 39.5 kg (87 lb)   BMI 17.57 kg/m²   Physical Exam:  Normal, gestational age-appropriate exam today        Plan   Medical Decision Making:    I have reviewed the prenatal labs and ultrasound(s) today. I have reviewed the most recent prenatal progress note(s).    Diagnosis: Supervision of low risk pregnancy   Smoker   Tests/Orders/Rx today: No orders of the defined types were placed in this encounter.      Medication Management: None     Topics discussed: Prenatal care milestones  Work/restrictions   Tests next visit: U/S for anatomic screening   Next visit: 2 week(s)     Negro Del Toro MD  Obstetrics and Gynecology  [facility]

## 2022-03-07 ENCOUNTER — ANESTHESIA EVENT (OUTPATIENT)
Dept: ENDOSCOPY | Facility: HOSPITAL | Age: 62
End: 2022-03-07
Payer: COMMERCIAL

## 2022-03-07 RX ORDER — SODIUM CHLORIDE, SODIUM LACTATE, POTASSIUM CHLORIDE, CALCIUM CHLORIDE 600; 310; 30; 20 MG/100ML; MG/100ML; MG/100ML; MG/100ML
INJECTION, SOLUTION INTRAVENOUS CONTINUOUS
Status: CANCELLED | OUTPATIENT
Start: 2022-03-07

## 2022-03-08 ENCOUNTER — HOSPITAL ENCOUNTER (OUTPATIENT)
Facility: HOSPITAL | Age: 62
Setting detail: HOSPITAL OUTPATIENT SURGERY
Discharge: HOME | End: 2022-03-08
Attending: INTERNAL MEDICINE | Admitting: INTERNAL MEDICINE
Payer: COMMERCIAL

## 2022-03-08 ENCOUNTER — ANESTHESIA (OUTPATIENT)
Dept: ENDOSCOPY | Facility: HOSPITAL | Age: 62
End: 2022-03-08
Payer: COMMERCIAL

## 2022-03-08 VITALS
DIASTOLIC BLOOD PRESSURE: 64 MMHG | SYSTOLIC BLOOD PRESSURE: 112 MMHG | BODY MASS INDEX: 23.79 KG/M2 | HEART RATE: 74 BPM | HEIGHT: 61 IN | OXYGEN SATURATION: 97 % | RESPIRATION RATE: 16 BRPM | TEMPERATURE: 98 F | WEIGHT: 126 LBS

## 2022-03-08 DIAGNOSIS — Z01.812 ENCOUNTER FOR PREPROCEDURE SCREENING LABORATORY TESTING FOR COVID-19: Primary | ICD-10-CM

## 2022-03-08 DIAGNOSIS — R10.9 ABDOMINAL PAIN, UNSPECIFIED ABDOMINAL LOCATION: ICD-10-CM

## 2022-03-08 DIAGNOSIS — R13.19 ESOPHAGEAL DYSPHAGIA: ICD-10-CM

## 2022-03-08 DIAGNOSIS — Z20.822 ENCOUNTER FOR PREPROCEDURE SCREENING LABORATORY TESTING FOR COVID-19: Primary | ICD-10-CM

## 2022-03-08 LAB — SARS-COV-2 RNA RESP QL NAA+PROBE: NOT DETECTED

## 2022-03-08 PROCEDURE — 88305 TISSUE EXAM BY PATHOLOGIST: CPT | Performed by: INTERNAL MEDICINE

## 2022-03-08 PROCEDURE — 0DJD8ZZ INSPECTION OF LOWER INTESTINAL TRACT, VIA NATURAL OR ARTIFICIAL OPENING ENDOSCOPIC: ICD-10-PCS | Performed by: INTERNAL MEDICINE

## 2022-03-08 PROCEDURE — 0DB98ZX EXCISION OF DUODENUM, VIA NATURAL OR ARTIFICIAL OPENING ENDOSCOPIC, DIAGNOSTIC: ICD-10-PCS | Performed by: INTERNAL MEDICINE

## 2022-03-08 PROCEDURE — 0DB58ZX EXCISION OF ESOPHAGUS, VIA NATURAL OR ARTIFICIAL OPENING ENDOSCOPIC, DIAGNOSTIC: ICD-10-PCS | Performed by: INTERNAL MEDICINE

## 2022-03-08 PROCEDURE — 0DB78ZX EXCISION OF STOMACH, PYLORUS, VIA NATURAL OR ARTIFICIAL OPENING ENDOSCOPIC, DIAGNOSTIC: ICD-10-PCS | Performed by: INTERNAL MEDICINE

## 2022-03-08 RX ORDER — SODIUM CHLORIDE, SODIUM LACTATE, POTASSIUM CHLORIDE, CALCIUM CHLORIDE 600; 310; 30; 20 MG/100ML; MG/100ML; MG/100ML; MG/100ML
INJECTION, SOLUTION INTRAVENOUS CONTINUOUS
Status: DISCONTINUED | OUTPATIENT
Start: 2022-03-08 | End: 2022-03-08

## 2022-03-08 RX ORDER — LIDOCAINE HYDROCHLORIDE 10 MG/ML
INJECTION, SOLUTION EPIDURAL; INFILTRATION; INTRACAUDAL; PERINEURAL AS NEEDED
Status: DISCONTINUED | OUTPATIENT
Start: 2022-03-08 | End: 2022-03-08 | Stop reason: SURG

## 2022-03-08 RX ADMIN — LIDOCAINE HYDROCHLORIDE 25 MG: 10 INJECTION, SOLUTION EPIDURAL; INFILTRATION; INTRACAUDAL; PERINEURAL at 10:45:00

## 2022-03-08 RX ADMIN — SODIUM CHLORIDE, SODIUM LACTATE, POTASSIUM CHLORIDE, CALCIUM CHLORIDE: 600; 310; 30; 20 INJECTION, SOLUTION INTRAVENOUS at 10:35:00

## 2022-03-08 NOTE — OPERATIVE REPORT
[facility] Patient Status:  Hospital Outpatient Surgery    1960 MRN [id number]   Location [id number] [place] [address] Attending Bre Montanez MD   Date 3/8/2022 PCP Frank Hooker MD     PREOPERATIVE DIAGNOSIS/INDICATION: Abdominal pain, abnormal imaging of the colon  POSTOPERTATIVE DIAGNOSIS: normal  PROCEDURE PERFORMED: COLONOSCOPY  SEDATION: MAC sedation provided by General Anesthesia    TIME OUT WAS PERFORMED    INFORMED CONSENT: Risks, benefits and alternatives to the procedure were explained to the patient including but not limited to bleeding, infection, perforation, adverse drug reactions, pancreatitis and the need for hospitalization and surgery if this occurs, the patient understands and agrees to procedure. PROCEDURE DESCRIPTION: After careful digital rectal examination a pediatric colonoscope was introduced into the patients rectum, advanced pass the recto sigmoid junction, into the descending colon, splenic flexure, transverse colon, hepatic flexure, ascending colon, cecum and the last 5-10cm of the terminal ileum, confirmed by landmarks, including the appendiceal orifice and ileocecal valve. Careful examination of the above described areas was performed on withdrawal of the endoscope. Retroflexion was performed on the rectum. The patient tolerated the procedure well, there were no immediate complication immediately following the procedure, and the patient was transferred to recovery in stable condition. QUALITY OF PREPARATION: [place] Bowel Preparation Scale:            -      Right colon 3, Transverse colon 3, Left colon 3   FINDINGS/THERAPEUTICS:  - TERMINAL ILEUM: Normal  - COLON: Normal    RECOMMENDATIONS:   - Post Colonoscopy precautions, watch for bleeding, infection, perforation, adverse drug reactions   - Repeat colonoscopy in 10 years.     Jj Marsh MD  3/8/2022  12:09 PM

## 2022-03-08 NOTE — ANESTHESIA POSTPROCEDURE EVALUATION
[address] Patient Status:  Hospital Outpatient Surgery   Age/Gender 62year old female MRN [id number]   Location [address] [address] Attending Rich Weller MD   Hosp Day # 0 PCP Charline Oconnor MD       Anesthesia Post-op Note    ESOPHAGOGASTRODUODENOSCOPY (EGD) with biopsies,  and colonoscopy    Procedure Summary     Date: 03/08/22 Room / Location: [facility]4 [facility] ENDOSCOPY 02 / 1404 [facility] ENDOSCOPY    Anesthesia Start: 5220 Anesthesia Stop:     Procedures:       ESOPHAGOGASTRODUODENOSCOPY (EGD) with biopsies, and colonoscopy (N/A )      COLONOSCOPY (N/A ) Diagnosis:       Abdominal pain, unspecified abdominal location      Esophageal dysphagia      (hiatal hernia, Schatzki's ring, normal colonoscopy)    Surgeons: Rich Weller MD Anesthesiologist: Collin Khan MD    Anesthesia Type: MAC ASA Status: 2          Anesthesia Type: MAC    Vitals Value Taken Time   BP 93/54 03/08/22 1114   Temp 98.0 03/08/22 1114   Pulse 68 03/08/22 1114   Resp 16 03/08/22 1114   SpO2 100% 03/08/22 1114       Patient Location: Endoscopy    Anesthesia Type: MAC    Airway Patency: patent    Postop Pain Control: adequate    Mental Status: mildly sedated but able to meaningfully participate in the post-anesthesia evaluation    Nausea/Vomiting: none    Cardiopulmonary/Hydration status: stable euvolemic    Complications: no apparent anesthesia related complications    Postop vital signs: stable    Dental Exam: Unchanged from Preop    Patient to be discharged home when criteria met.

## 2022-03-11 ENCOUNTER — LAB ENCOUNTER (OUTPATIENT)
Dept: LAB | Facility: HOSPITAL | Age: 62
End: 2022-03-11
Attending: OBSTETRICS & GYNECOLOGY
Payer: COMMERCIAL

## 2022-03-11 ENCOUNTER — ANESTHESIA EVENT (OUTPATIENT)
Dept: SURGERY | Facility: HOSPITAL | Age: 62
End: 2022-03-11
Payer: COMMERCIAL

## 2022-03-11 DIAGNOSIS — Z20.822 ENCOUNTER FOR PREOPERATIVE SCREENING LABORATORY TESTING FOR COVID-19 VIRUS: ICD-10-CM

## 2022-03-11 DIAGNOSIS — Z01.812 ENCOUNTER FOR PREOPERATIVE SCREENING LABORATORY TESTING FOR COVID-19 VIRUS: ICD-10-CM

## 2022-03-11 NOTE — PROGRESS NOTES
Date: 3/11/2022    To: Nery Hall  : 1960     I hope this letter finds you doing well. I am writing to inform you of the following: The biopsies obtained at the time of your recent endoscopic procedure were benign and showed no evidence of infection or malignancy. Please call the office at [phone] if there are any questions.     Rosmery Sanchez M.D.

## 2022-03-12 LAB — SARS-COV-2 RNA RESP QL NAA+PROBE: NOT DETECTED

## 2022-03-13 NOTE — H&P
63 y/o female with uterovaginal prolapse for surgical mgmt    Pre operative clearance with Dr Emma Cornejo, pt seen & examined without changes. Thorough discussion of surgical risks, benefits, and alternatives including, but not limited to bleeding/clots, infection, injury to nearby organs (urethra, bladder, ureters, bowel, blood vessels), dyspareunia, de ezio UUI, worsening ALISSA, recurrence, voiding dysfunction, and pain. Discussed pain mgmt and potential need for narcotics. Discussed addiction potential with narcotics. IL  reviewed. Plan to proceed with TVH poss BSO USVVS APE repair, cysto as consented  All questions answered.    Lucky Angelucci  [phone]

## 2022-03-14 ENCOUNTER — HOSPITAL ENCOUNTER (OUTPATIENT)
Facility: HOSPITAL | Age: 62
Discharge: HOME | End: 2022-03-15
Attending: OBSTETRICS & GYNECOLOGY | Admitting: OBSTETRICS & GYNECOLOGY
Payer: COMMERCIAL

## 2022-03-14 ENCOUNTER — ANESTHESIA (OUTPATIENT)
Dept: SURGERY | Facility: HOSPITAL | Age: 62
End: 2022-03-14
Payer: COMMERCIAL

## 2022-03-14 DIAGNOSIS — Z20.822 ENCOUNTER FOR PREOPERATIVE SCREENING LABORATORY TESTING FOR COVID-19 VIRUS: Primary | ICD-10-CM

## 2022-03-14 DIAGNOSIS — N81.4 UTEROVAGINAL PROLAPSE: ICD-10-CM

## 2022-03-14 DIAGNOSIS — Z01.812 ENCOUNTER FOR PREOPERATIVE SCREENING LABORATORY TESTING FOR COVID-19 VIRUS: Primary | ICD-10-CM

## 2022-03-14 PROBLEM — N81.2 UTEROVAGINAL PROLAPSE, INCOMPLETE: Status: ACTIVE | Noted: 2022-03-14

## 2022-03-14 PROCEDURE — 0UQF0ZZ REPAIR CUL-DE-SAC, OPEN APPROACH: ICD-10-PCS | Performed by: OBSTETRICS & GYNECOLOGY

## 2022-03-14 PROCEDURE — 0UT97ZZ RESECTION OF UTERUS, VIA NATURAL OR ARTIFICIAL OPENING: ICD-10-PCS | Performed by: OBSTETRICS & GYNECOLOGY

## 2022-03-14 PROCEDURE — 0USG7ZZ REPOSITION VAGINA, VIA NATURAL OR ARTIFICIAL OPENING: ICD-10-PCS | Performed by: OBSTETRICS & GYNECOLOGY

## 2022-03-14 PROCEDURE — 0JQC0ZZ REPAIR PELVIC REGION SUBCUTANEOUS TISSUE AND FASCIA, OPEN APPROACH: ICD-10-PCS | Performed by: OBSTETRICS & GYNECOLOGY

## 2022-03-14 PROCEDURE — 88305 TISSUE EXAM BY PATHOLOGIST: CPT | Performed by: OBSTETRICS & GYNECOLOGY

## 2022-03-14 PROCEDURE — 0TSD4ZZ REPOSITION URETHRA, PERCUTANEOUS ENDOSCOPIC APPROACH: ICD-10-PCS | Performed by: OBSTETRICS & GYNECOLOGY

## 2022-03-14 RX ORDER — MEPERIDINE HYDROCHLORIDE 25 MG/ML
12.5 INJECTION INTRAMUSCULAR; INTRAVENOUS; SUBCUTANEOUS AS NEEDED
Status: DISCONTINUED | OUTPATIENT
Start: 2022-03-14 | End: 2022-03-14 | Stop reason: HOSPADM

## 2022-03-14 RX ORDER — SODIUM CHLORIDE, SODIUM LACTATE, POTASSIUM CHLORIDE, CALCIUM CHLORIDE 600; 310; 30; 20 MG/100ML; MG/100ML; MG/100ML; MG/100ML
INJECTION, SOLUTION INTRAVENOUS CONTINUOUS
Status: DISCONTINUED | OUTPATIENT
Start: 2022-03-14 | End: 2022-03-15

## 2022-03-14 RX ORDER — KETOROLAC TROMETHAMINE 30 MG/ML
INJECTION, SOLUTION INTRAMUSCULAR; INTRAVENOUS AS NEEDED
Status: DISCONTINUED | OUTPATIENT
Start: 2022-03-14 | End: 2022-03-14 | Stop reason: SURG

## 2022-03-14 RX ORDER — IBUPROFEN 600 MG/1
600 TABLET ORAL EVERY 6 HOURS SCHEDULED
Status: DISCONTINUED | OUTPATIENT
Start: 2022-03-15 | End: 2022-03-15

## 2022-03-14 RX ORDER — ONDANSETRON 4 MG/1
4 TABLET, FILM COATED ORAL EVERY 8 HOURS PRN
Status: DISCONTINUED | OUTPATIENT
Start: 2022-03-14 | End: 2022-03-15

## 2022-03-14 RX ORDER — HYDROMORPHONE HYDROCHLORIDE 1 MG/ML
0.4 INJECTION, SOLUTION INTRAMUSCULAR; INTRAVENOUS; SUBCUTANEOUS EVERY 5 MIN PRN
Status: DISCONTINUED | OUTPATIENT
Start: 2022-03-14 | End: 2022-03-14 | Stop reason: HOSPADM

## 2022-03-14 RX ORDER — ACETAMINOPHEN 500 MG
1000 TABLET ORAL ONCE
Status: DISCONTINUED | OUTPATIENT
Start: 2022-03-14 | End: 2022-03-14 | Stop reason: HOSPADM

## 2022-03-14 RX ORDER — HYDROMORPHONE HYDROCHLORIDE 1 MG/ML
0.8 INJECTION, SOLUTION INTRAMUSCULAR; INTRAVENOUS; SUBCUTANEOUS EVERY 2 HOUR PRN
Status: DISCONTINUED | OUTPATIENT
Start: 2022-03-14 | End: 2022-03-15

## 2022-03-14 RX ORDER — DEXAMETHASONE SODIUM PHOSPHATE 4 MG/ML
VIAL (ML) INJECTION AS NEEDED
Status: DISCONTINUED | OUTPATIENT
Start: 2022-03-14 | End: 2022-03-14 | Stop reason: SURG

## 2022-03-14 RX ORDER — HYDROMORPHONE HYDROCHLORIDE 1 MG/ML
0.4 INJECTION, SOLUTION INTRAMUSCULAR; INTRAVENOUS; SUBCUTANEOUS EVERY 2 HOUR PRN
Status: DISCONTINUED | OUTPATIENT
Start: 2022-03-14 | End: 2022-03-15

## 2022-03-14 RX ORDER — HYDROCODONE BITARTRATE AND ACETAMINOPHEN 5; 325 MG/1; MG/1
2 TABLET ORAL AS NEEDED
Status: DISCONTINUED | OUTPATIENT
Start: 2022-03-14 | End: 2022-03-14 | Stop reason: HOSPADM

## 2022-03-14 RX ORDER — ALPRAZOLAM 0.25 MG/1
0.25 TABLET ORAL EVERY 4 HOURS PRN
Status: DISCONTINUED | OUTPATIENT
Start: 2022-03-14 | End: 2022-03-15

## 2022-03-14 RX ORDER — NALOXONE HYDROCHLORIDE 0.4 MG/ML
80 INJECTION, SOLUTION INTRAMUSCULAR; INTRAVENOUS; SUBCUTANEOUS AS NEEDED
Status: DISCONTINUED | OUTPATIENT
Start: 2022-03-14 | End: 2022-03-14 | Stop reason: HOSPADM

## 2022-03-14 RX ORDER — EPHEDRINE SULFATE 50 MG/ML
INJECTION INTRAVENOUS AS NEEDED
Status: DISCONTINUED | OUTPATIENT
Start: 2022-03-14 | End: 2022-03-14 | Stop reason: SURG

## 2022-03-14 RX ORDER — TRAMADOL HYDROCHLORIDE 50 MG/1
50 TABLET ORAL EVERY 6 HOURS
Status: DISCONTINUED | OUTPATIENT
Start: 2022-03-14 | End: 2022-03-15

## 2022-03-14 RX ORDER — LABETALOL HYDROCHLORIDE 5 MG/ML
5 INJECTION, SOLUTION INTRAVENOUS EVERY 5 MIN PRN
Status: DISCONTINUED | OUTPATIENT
Start: 2022-03-14 | End: 2022-03-14 | Stop reason: HOSPADM

## 2022-03-14 RX ORDER — ESCITALOPRAM OXALATE 10 MG/1
10 TABLET ORAL DAILY
Status: DISCONTINUED | OUTPATIENT
Start: 2022-03-14 | End: 2022-03-15

## 2022-03-14 RX ORDER — HYDROMORPHONE HYDROCHLORIDE 1 MG/ML
INJECTION, SOLUTION INTRAMUSCULAR; INTRAVENOUS; SUBCUTANEOUS
Status: COMPLETED
Start: 2022-03-14 | End: 2022-03-14

## 2022-03-14 RX ORDER — ONDANSETRON 2 MG/ML
INJECTION INTRAMUSCULAR; INTRAVENOUS AS NEEDED
Status: DISCONTINUED | OUTPATIENT
Start: 2022-03-14 | End: 2022-03-14 | Stop reason: SURG

## 2022-03-14 RX ORDER — CEFAZOLIN SODIUM/WATER 2 G/20 ML
2 SYRINGE (ML) INTRAVENOUS ONCE
Status: COMPLETED | OUTPATIENT
Start: 2022-03-14 | End: 2022-03-14

## 2022-03-14 RX ORDER — MONTELUKAST SODIUM 10 MG/1
10 TABLET ORAL DAILY
Status: DISCONTINUED | OUTPATIENT
Start: 2022-03-14 | End: 2022-03-15

## 2022-03-14 RX ORDER — AMLODIPINE BESYLATE 5 MG/1
5 TABLET ORAL NIGHTLY
Status: DISCONTINUED | OUTPATIENT
Start: 2022-03-14 | End: 2022-03-15

## 2022-03-14 RX ORDER — HYDROMORPHONE HYDROCHLORIDE 1 MG/ML
0.2 INJECTION, SOLUTION INTRAMUSCULAR; INTRAVENOUS; SUBCUTANEOUS EVERY 2 HOUR PRN
Status: DISCONTINUED | OUTPATIENT
Start: 2022-03-14 | End: 2022-03-15

## 2022-03-14 RX ORDER — ONDANSETRON 2 MG/ML
4 INJECTION INTRAMUSCULAR; INTRAVENOUS AS NEEDED
Status: DISCONTINUED | OUTPATIENT
Start: 2022-03-14 | End: 2022-03-14 | Stop reason: HOSPADM

## 2022-03-14 RX ORDER — KETOROLAC TROMETHAMINE 30 MG/ML
30 INJECTION, SOLUTION INTRAMUSCULAR; INTRAVENOUS EVERY 6 HOURS
Status: COMPLETED | OUTPATIENT
Start: 2022-03-14 | End: 2022-03-15

## 2022-03-14 RX ORDER — HYDROCODONE BITARTRATE AND ACETAMINOPHEN 5; 325 MG/1; MG/1
1 TABLET ORAL EVERY 6 HOURS PRN
Status: DISCONTINUED | OUTPATIENT
Start: 2022-03-14 | End: 2022-03-15

## 2022-03-14 RX ORDER — HYDROCODONE BITARTRATE AND ACETAMINOPHEN 5; 325 MG/1; MG/1
1 TABLET ORAL AS NEEDED
Status: DISCONTINUED | OUTPATIENT
Start: 2022-03-14 | End: 2022-03-14 | Stop reason: HOSPADM

## 2022-03-14 RX ORDER — MONTELUKAST SODIUM 10 MG/1
10 TABLET ORAL DAILY
COMMUNITY
Start: 2022-02-12

## 2022-03-14 RX ORDER — METOCLOPRAMIDE HYDROCHLORIDE 5 MG/ML
10 INJECTION INTRAMUSCULAR; INTRAVENOUS AS NEEDED
Status: DISCONTINUED | OUTPATIENT
Start: 2022-03-14 | End: 2022-03-14 | Stop reason: HOSPADM

## 2022-03-14 RX ORDER — BUPIVACAINE HYDROCHLORIDE AND EPINEPHRINE 2.5; 5 MG/ML; UG/ML
INJECTION, SOLUTION EPIDURAL; INFILTRATION; INTRACAUDAL; PERINEURAL AS NEEDED
Status: DISCONTINUED | OUTPATIENT
Start: 2022-03-14 | End: 2022-03-14 | Stop reason: HOSPADM

## 2022-03-14 RX ORDER — MIDAZOLAM HYDROCHLORIDE 1 MG/ML
1 INJECTION INTRAMUSCULAR; INTRAVENOUS EVERY 5 MIN PRN
Status: DISCONTINUED | OUTPATIENT
Start: 2022-03-14 | End: 2022-03-14 | Stop reason: HOSPADM

## 2022-03-14 RX ORDER — PANTOPRAZOLE SODIUM 40 MG/1
40 TABLET, DELAYED RELEASE ORAL
Status: DISCONTINUED | OUTPATIENT
Start: 2022-03-14 | End: 2022-03-15

## 2022-03-14 RX ORDER — ACETAMINOPHEN 500 MG
1000 TABLET ORAL ONCE AS NEEDED
Status: DISCONTINUED | OUTPATIENT
Start: 2022-03-14 | End: 2022-03-14 | Stop reason: HOSPADM

## 2022-03-14 RX ORDER — SODIUM CHLORIDE, SODIUM LACTATE, POTASSIUM CHLORIDE, CALCIUM CHLORIDE 600; 310; 30; 20 MG/100ML; MG/100ML; MG/100ML; MG/100ML
INJECTION, SOLUTION INTRAVENOUS CONTINUOUS
Status: DISCONTINUED | OUTPATIENT
Start: 2022-03-14 | End: 2022-03-14 | Stop reason: HOSPADM

## 2022-03-14 RX ORDER — ONDANSETRON 2 MG/ML
4 INJECTION INTRAMUSCULAR; INTRAVENOUS EVERY 8 HOURS PRN
Status: DISCONTINUED | OUTPATIENT
Start: 2022-03-14 | End: 2022-03-15

## 2022-03-14 RX ORDER — PANTOPRAZOLE SODIUM 40 MG/1
40 TABLET, DELAYED RELEASE ORAL DAILY
COMMUNITY
Start: 2022-02-12

## 2022-03-14 RX ORDER — ROSUVASTATIN CALCIUM 20 MG/1
20 TABLET, COATED ORAL NIGHTLY
Status: DISCONTINUED | OUTPATIENT
Start: 2022-03-14 | End: 2022-03-15

## 2022-03-14 RX ORDER — GABAPENTIN 300 MG/1
300 CAPSULE ORAL NIGHTLY
Status: DISCONTINUED | OUTPATIENT
Start: 2022-03-14 | End: 2022-03-15

## 2022-03-14 RX ADMIN — ONDANSETRON 4 MG: 2 INJECTION INTRAMUSCULAR; INTRAVENOUS at 08:38:00

## 2022-03-14 RX ADMIN — CEFAZOLIN SODIUM/WATER 2 G: 2 G/20 ML SYRINGE (ML) INTRAVENOUS at 07:37:00

## 2022-03-14 RX ADMIN — KETOROLAC TROMETHAMINE 30 MG: 30 INJECTION, SOLUTION INTRAMUSCULAR; INTRAVENOUS at 08:38:00

## 2022-03-14 RX ADMIN — SODIUM CHLORIDE, SODIUM LACTATE, POTASSIUM CHLORIDE, CALCIUM CHLORIDE: 600; 310; 30; 20 INJECTION, SOLUTION INTRAVENOUS at 09:03:00

## 2022-03-14 RX ADMIN — DEXAMETHASONE SODIUM PHOSPHATE 8 MG: 4 MG/ML VIAL (ML) INJECTION at 07:34:00

## 2022-03-14 RX ADMIN — EPHEDRINE SULFATE 10 MG: 50 INJECTION INTRAVENOUS at 07:46:00

## 2022-03-14 RX ADMIN — EPHEDRINE SULFATE 10 MG: 50 INJECTION INTRAVENOUS at 07:41:00

## 2022-03-14 NOTE — PLAN OF CARE
Pt admitted from PACU following total vaginal hysterectomy. Very drowsy, but arousable to voice. VSS, O2 sats normal on rm air. Vaginal incision with ice pack. Ice to abd also.  at bedside and updated on plan of care. Once pt wakes up more, okay to start on clears. Bey draining clear, yellow urine, orders to dc tomorrow morning. IVF infusing as ordered, prn pain meds. Will continue with hourly and prn safety rounds.     Problem: Patient/Family Goals  Goal: Patient/Family Long Term Goal  Description: Patient's Long Term Goal: discharge home    Interventions:  - pain control  -adv diet and activity as owen  -discontinue bey, void independently    - See additional Care Plan goals for specific interventions  Outcome: Progressing  Goal: Patient/Family Short Term Goal  Description: Patient's Short Term Goal: comfort    Interventions:   - prn pain meds  -adv diet and activity  -remove bey per order, able to void    - See additional Care Plan goals for specific interventions  Outcome: Progressing     Problem: PAIN - ADULT  Goal: Verbalizes/displays adequate comfort level or patient's stated pain goal  Description: INTERVENTIONS:  - Encourage pt to monitor pain and request assistance  - Assess pain using appropriate pain scale  - Administer analgesics based on type and severity of pain and evaluate response  - Implement non-pharmacological measures as appropriate and evaluate response  - Consider cultural and social influences on pain and pain management  - Manage/alleviate anxiety  - Utilize distraction and/or relaxation techniques  - Monitor for opioid side effects  - Notify MD/LIP if interventions unsuccessful or patient reports new pain  - Anticipate increased pain with activity and pre-medicate as appropriate  Outcome: Progressing     Problem: RISK FOR INFECTION - ADULT  Goal: Absence of fever/infection during anticipated neutropenic period  Description: INTERVENTIONS  - Monitor WBC  - Administer growth factors as ordered  - Implement neutropenic guidelines  Outcome: Progressing     Problem: SAFETY ADULT - FALL  Goal: Free from fall injury  Description: INTERVENTIONS:  - Assess pt frequently for physical needs  - Identify cognitive and physical deficits and behaviors that affect risk of falls.   - [place] fall precautions as indicated by assessment.  - Educate pt/family on patient safety including physical limitations  - Instruct pt to call for assistance with activity based on assessment  - Modify environment to reduce risk of injury  - Provide assistive devices as appropriate  - Consider OT/PT consult to assist with strengthening/mobility  - Encourage toileting schedule  Outcome: Progressing     Problem: DISCHARGE PLANNING  Goal: Discharge to home or other facility with appropriate resources  Description: INTERVENTIONS:  - Identify barriers to discharge w/pt and caregiver  - Include patient/family/discharge partner in discharge planning  - Arrange for needed discharge resources and transportation as appropriate  - Identify discharge learning needs (meds, wound care, etc)  - Arrange for interpreters to assist at discharge as needed  - Consider post-discharge preferences of patient/family/discharge partner  - Complete POLST form as appropriate  - Assess patient's ability to be responsible for managing their own health  - Refer to Case Management Department for coordinating discharge planning if the patient needs post-hospital services based on physician/LIP order or complex needs related to functional status, cognitive ability or social support system  Outcome: Progressing     Problem: Altered Communication/Language Barrier  Goal: Patient/Family is able to understand and participate in their care  Description: Interventions:  - Assess communication ability and preferred communication style  - Implement communication aides and strategies  - Use visual cues when possible  - Listen attentively, be patient, do not interrupt  - Minimize distractions  - Allow time for understanding and response  - Establish method for patient to ask for assistance (call light)  - Provide an  as needed  - Communicate barriers and strategies to overcome with those who interact with patient  Outcome: Progressing

## 2022-03-14 NOTE — ANESTHESIA PROCEDURE NOTES
Peripheral IV  Date/Time: 3/14/2022 7:45 AM  Inserted by: Alma Jensen MD    Placement  Needle size: 20 G  Laterality: left  Location: forearm  Local anesthetic: none  Site prep: alcohol  Technique: anatomical landmarks  Attempts: 1

## 2022-03-14 NOTE — OPERATIVE REPORT
PRE-OP DIAGNOSIS:  Uterovaginal prolapse    POST-OP DIAGNOSIS:  Same    PROCEDURE:  Transvaginal hysterectomy, Repair of enterocele; uterosacral ligament suspension; anterior colporrhaphy; posterior colporrhaphy; cystourethroscopy    SURGEON:  Alec Luke DO    ASSISTANT:  Rinku    ANESTHESIA:  General    EBL:  100 ml  UOP: 200cc    Specimen: uterus and cervix    No complications    Findings: bilateral ureteral efflux, no evidence of bladder, ureteral, or urethral injury. Hemostasis upon completion. PROCEDURE:  The patient was taken to the operating room, with IV running after informed consent was obtained. She was placed in the supine position and induced under general anesthesia. The patient was then placed in the dorsal lithotomy position and prepped and draped in the usual sterile fashion. The Bookwalter retractor was utilized and retraction was placed into the vagina. The cervix was grasped with tenacula and cautery was used to make a circumferential incision after infiltration local solution (.25% marcaine with epinepherine). The anterior and posterior cul-de-sacs were entered sharply and then the uterosacral cardinal ligament complexes were clamped, cut and suture ligated bilaterally. The fundus of the uterus was delivered posteriorly and then the uteroovarian complexes were clamped and cut, freeing the specimen. These pedicles were then also suture ligated. A lap sponge was placed into the peritoneal cavity and inspection revealed normal appearing ovaries bilaterally. Unable to visualize fallopian tubes. Hemostasis was noted. Specimen was sent to pathology, uterus and cervix. Due to the presence of significant apical prolapse and associated enterocele, it was necessary to proceed with suspension of the vaginal apex and enterocele repair. The uterosacral ligaments were identified and a Anderson stitch was placed and tagged to repair the enterocele.  This suture was placed through the posterior vaginal wall, through the left uterosacral ligament, across the anterior rectum, through the right uterosacral ligament and then back through the posterior vaginal wall. Two uterosacral suspension stitches were then placed on each side of the pelvis. These were placed high on the uterosacral ligaments at and just proximal to the ischial spine. The uterosacral suspension stitches were then placed on each side of the pelvis for apical suspension. Once these sutures were placed, the urethra was dilated to accommodate a 21 [place] caliber cystoscope sheath and cystoscopy was undertaken. Cystoscopy confirmed that there had been no injury to the bladder. In addition, urine was seen from both ureteral orifices. Distal tissue collection with appearance of previous urethral bulking agent, discussed with urology. The cut edge of the anterior vaginal epithelium was then grasped with Allis clamps and the midline incision was made after infiltration with local solution (.25%marcaine with epinepherine). Sharp dissection with Metzenbaum scissors was performed to dissect the redundant anterior vaginal epithelium from the underlying endopelvic connective tissue of the bladder. 0 Vicryl suture was then used to plicate the endopelvic  connective tissue, obliterating the cystocele. The excess vaginal epithelium was excised and the midline incision was then closed with 2-0 Vicryl suture in running locking fashion. Hemostasis was noted. The lap sponge was removed from the peritoneal cavity and the vaginal apex was then closed with 2-0 Vicryl suture in a running locking fashion. The supporting stitches were then tied down resulting in satisfactory elevation of the vaginal apex. The bladder was emptied, cystoscopy was performed and urine was again seen from both ureteral orifices. There was no evidence of bladder, ureteral, or urethral injury.     Attention was then directed to the posterior compartment. The redundant posterior vaginal epithelium and perineal skin was excised after infiltration with . 25% marcaine with epinepherine. 0 Vicryl suture was then used to plicate the rectovaginal connective tissue, obliterating the rectocele. Additional sutures of 0 Vicryl were utilized to reconstruct the perineal body. Rectal examination confirmed that none of these sutures had impinged the rectum. 2-0 Vicryl sutures were then used to reapproximate the vaginal epithelium in the midline in a running locking fashion. Inspection at this point revealed a well-supported vaginal apex, anterior, and posterior compartments, with good hemostasis. A Turpin catheter was placed transurethrally. The patient was then removed from the dorsal lithotomy position, awakened and transferred from the operating room to the recovery room in stable  condition, having tolerated the procedure well. Sponge, lap, and needle counts were correct.

## 2022-03-15 VITALS
RESPIRATION RATE: 18 BRPM | BODY MASS INDEX: 23.38 KG/M2 | OXYGEN SATURATION: 96 % | WEIGHT: 123.81 LBS | DIASTOLIC BLOOD PRESSURE: 74 MMHG | HEIGHT: 61 IN | SYSTOLIC BLOOD PRESSURE: 133 MMHG | HEART RATE: 73 BPM | TEMPERATURE: 97 F

## 2022-03-15 LAB
BASOPHILS # BLD AUTO: 0.01 X10(3) UL (ref 0–0.2)
BASOPHILS NFR BLD AUTO: 0.1 %
EOSINOPHIL # BLD AUTO: 0 X10(3) UL (ref 0–0.7)
EOSINOPHIL NFR BLD AUTO: 0 %
ERYTHROCYTE [DISTWIDTH] IN BLOOD BY AUTOMATED COUNT: 13.7 %
HCT VFR BLD AUTO: 38.3 %
HGB BLD-MCNC: 12.8 G/DL
IMM GRANULOCYTES # BLD AUTO: 0.07 X10(3) UL (ref 0–1)
IMM GRANULOCYTES NFR BLD: 0.5 %
LYMPHOCYTES # BLD AUTO: 1.95 X10(3) UL (ref 1–4)
LYMPHOCYTES NFR BLD AUTO: 13.9 %
MCH RBC QN AUTO: 29.1 PG (ref 26–34)
MCHC RBC AUTO-ENTMCNC: 33.4 G/DL (ref 31–37)
MCV RBC AUTO: 87 FL
MONOCYTES # BLD AUTO: 0.93 X10(3) UL (ref 0.1–1)
MONOCYTES NFR BLD AUTO: 6.6 %
NEUTROPHILS # BLD AUTO: 11.1 X10 (3) UL (ref 1.5–7.7)
NEUTROPHILS # BLD AUTO: 11.1 X10(3) UL (ref 1.5–7.7)
NEUTROPHILS NFR BLD AUTO: 78.9 %
PLATELET # BLD AUTO: 252 10(3)UL (ref 150–450)
RBC # BLD AUTO: 4.4 X10(6)UL
WBC # BLD AUTO: 14.1 X10(3) UL (ref 4–11)

## 2022-03-15 PROCEDURE — 85025 COMPLETE CBC W/AUTO DIFF WBC: CPT | Performed by: OBSTETRICS & GYNECOLOGY

## 2022-03-15 RX ORDER — TRAMADOL HYDROCHLORIDE 50 MG/1
50 TABLET ORAL EVERY 6 HOURS
Qty: 20 TABLET | Refills: 0 | Status: SHIPPED | OUTPATIENT
Start: 2022-03-15

## 2022-03-15 RX ORDER — ACETAMINOPHEN 500 MG
500 TABLET ORAL EVERY 6 HOURS PRN
Refills: 0 | Status: SHIPPED | COMMUNITY
Start: 2022-03-15

## 2022-03-15 RX ORDER — IBUPROFEN 600 MG/1
600 TABLET ORAL EVERY 6 HOURS SCHEDULED
Qty: 30 TABLET | Refills: 0 | Status: SHIPPED | OUTPATIENT
Start: 2022-03-15

## 2022-03-15 RX ORDER — ACETAMINOPHEN 500 MG
500 TABLET ORAL EVERY 6 HOURS PRN
Status: DISCONTINUED | OUTPATIENT
Start: 2022-03-15 | End: 2022-03-15

## 2022-03-15 NOTE — PLAN OF CARE
Problem: Patient/Family Goals  Goal: Patient/Family Long Term Goal  Description: Patient's Long Term Goal: discharge home    Interventions:  - pain control  -adv diet and activity as owen  -discontinue bey, void independently    - See additional Care Plan goals for specific interventions  3/15/2022 1547 by Tonya Keating RN  Outcome: Progressing  3/15/2022 1142 by Tonya Keating RN  Outcome: Progressing  Goal: Patient/Family Short Term Goal  Description: Patient's Short Term Goal: comfort    Interventions:   - prn pain meds  -adv diet and activity  -remove bey per order, able to void    - See additional Care Plan goals for specific interventions  3/15/2022 1547 by Tonay Keating RN  Outcome: Progressing  3/15/2022 1142 by Tonya Keating RN  Outcome: Progressing     Problem: PAIN - ADULT  Goal: Verbalizes/displays adequate comfort level or patient's stated pain goal  Description: INTERVENTIONS:  - Encourage pt to monitor pain and request assistance  - Assess pain using appropriate pain scale  - Administer analgesics based on type and severity of pain and evaluate response  - Implement non-pharmacological measures as appropriate and evaluate response  - Consider cultural and social influences on pain and pain management  - Manage/alleviate anxiety  - Utilize distraction and/or relaxation techniques  - Monitor for opioid side effects  - Notify MD/LIP if interventions unsuccessful or patient reports new pain  - Anticipate increased pain with activity and pre-medicate as appropriate  3/15/2022 1547 by Tonya Keating RN  Outcome: Progressing  3/15/2022 1142 by Tonya Keating RN  Outcome: Progressing     Problem: RISK FOR INFECTION - ADULT  Goal: Absence of fever/infection during anticipated neutropenic period  Description: INTERVENTIONS  - Monitor WBC  - Administer growth factors as ordered  - Implement neutropenic guidelines  3/15/2022 1547 by Tonya Keating RN  Outcome: Progressing  3/15/2022 1142 by Tonya Keating RN  Outcome: Progressing

## 2022-03-15 NOTE — PLAN OF CARE
Pt discharged home in stable condition. This RN went over bey catheter care with patient and her  and printed instructions were given as well. Pain med schedule typed out in discharge instructions and pt and  aware that while pt is uncomfortable she is able to rotate ultram, motrin, and extra strength tylenol and be able to take something for pain about every 2 hrs. Pt is aware of follow up appts to make, 1 for bey removal, and 1 for post-op instructions, and both her and  are aware of post-op complications to watch for and to call the doctor immediately. Neither pt nor her  had any questions/concerns on discharge. Pt Had PVR done x's 2 after bey removal and each one was >200. PA with Dr. Meli Ramirez notified and order received to re-insert bey. Bey re-inserted and return of clear, yellow urine right away. Will go over instructions and care with patient and . Pt to schedule appt for 1 week for another voiding trial.  Lunch ordered and pt encouraged to ambulate again. Possible discharge home later today. Pt agreeable to plan now. Call light and belongings within reach, will monitor hourly and prn safety rounds.     Problem: Patient/Family Goals  Goal: Patient/Family Long Term Goal  Description: Patient's Long Term Goal: discharge home    Interventions:  - pain control  -adv diet and activity as owen  -discontinue bey, void independently    - See additional Care Plan goals for specific interventions  Outcome: Progressing  Goal: Patient/Family Short Term Goal  Description: Patient's Short Term Goal: comfort    Interventions:   - prn pain meds  -adv diet and activity  -remove bey per order, able to void    - See additional Care Plan goals for specific interventions  Outcome: Progressing     Problem: PAIN - ADULT  Goal: Verbalizes/displays adequate comfort level or patient's stated pain goal  Description: INTERVENTIONS:  - Encourage pt to monitor pain and request assistance  - Assess pain using appropriate pain scale  - Administer analgesics based on type and severity of pain and evaluate response  - Implement non-pharmacological measures as appropriate and evaluate response  - Consider cultural and social influences on pain and pain management  - Manage/alleviate anxiety  - Utilize distraction and/or relaxation techniques  - Monitor for opioid side effects  - Notify MD/LIP if interventions unsuccessful or patient reports new pain  - Anticipate increased pain with activity and pre-medicate as appropriate  Outcome: Progressing     Problem: RISK FOR INFECTION - ADULT  Goal: Absence of fever/infection during anticipated neutropenic period  Description: INTERVENTIONS  - Monitor WBC  - Administer growth factors as ordered  - Implement neutropenic guidelines  Outcome: Progressing     Problem: SAFETY ADULT - FALL  Goal: Free from fall injury  Description: INTERVENTIONS:  - Assess pt frequently for physical needs  - Identify cognitive and physical deficits and behaviors that affect risk of falls.   - [place] fall precautions as indicated by assessment.  - Educate pt/family on patient safety including physical limitations  - Instruct pt to call for assistance with activity based on assessment  - Modify environment to reduce risk of injury  - Provide assistive devices as appropriate  - Consider OT/PT consult to assist with strengthening/mobility  - Encourage toileting schedule  Outcome: Progressing     Problem: DISCHARGE PLANNING  Goal: Discharge to home or other facility with appropriate resources  Description: INTERVENTIONS:  - Identify barriers to discharge w/pt and caregiver  - Include patient/family/discharge partner in discharge planning  - Arrange for needed discharge resources and transportation as appropriate  - Identify discharge learning needs (meds, wound care, etc)  - Arrange for interpreters to assist at discharge as needed  - Consider post-discharge preferences of patient/family/discharge partner  - Complete POLST form as appropriate  - Assess patient's ability to be responsible for managing their own health  - Refer to Case Management Department for coordinating discharge planning if the patient needs post-hospital services based on physician/LIP order or complex needs related to functional status, cognitive ability or social support system  Outcome: Progressing     Problem: Altered Communication/Language Barrier  Goal: Patient/Family is able to understand and participate in their care  Description: Interventions:  - Assess communication ability and preferred communication style  - Implement communication aides and strategies  - Use visual cues when possible  - Listen attentively, be patient, do not interrupt  - Minimize distractions  - Allow time for understanding and response  - Establish method for patient to ask for assistance (call light)  - Provide an  as needed  - Communicate barriers and strategies to overcome with those who interact with patient  Outcome: Progressing

## 2022-03-15 NOTE — PLAN OF CARE
A&Ox4. RA. SCDs in place. Denies SOB and chest pain. Tolerating small amounts of clears, hypoactive bowel sounds, and denies passing flatus. C/o nausea at times, secondary to pain, managed per [facility]. Ice to perineum. Bey in place, draining clear, yellow urine. Pain managed per MAR. SBA. IVF infusing. Helen pad and mesh panties in place, scant SS drainage noted. Pt and spouse updated on POC. All questions answered and concerns addressed. Safety precautions in place. Will continue to monitor.        Problem: Patient/Family Goals  Goal: Patient/Family Long Term Goal  Description: Patient's Long Term Goal: discharge home    Interventions:  - pain control  -adv diet and activity as woen  -discontinue bey, void independently    - See additional Care Plan goals for specific interventions  Outcome: Progressing  Goal: Patient/Family Short Term Goal  Description: Patient's Short Term Goal: comfort    Interventions:   - prn pain meds  -adv diet and activity  -remove bey per order, able to void    - See additional Care Plan goals for specific interventions  Outcome: Progressing     Problem: PAIN - ADULT  Goal: Verbalizes/displays adequate comfort level or patient's stated pain goal  Description: INTERVENTIONS:  - Encourage pt to monitor pain and request assistance  - Assess pain using appropriate pain scale  - Administer analgesics based on type and severity of pain and evaluate response  - Implement non-pharmacological measures as appropriate and evaluate response  - Consider cultural and social influences on pain and pain management  - Manage/alleviate anxiety  - Utilize distraction and/or relaxation techniques  - Monitor for opioid side effects  - Notify MD/LIP if interventions unsuccessful or patient reports new pain  - Anticipate increased pain with activity and pre-medicate as appropriate  Outcome: Progressing     Problem: RISK FOR INFECTION - ADULT  Goal: Absence of fever/infection during anticipated neutropenic period  Description: INTERVENTIONS  - Monitor WBC  - Administer growth factors as ordered  - Implement neutropenic guidelines  Outcome: Progressing     Problem: SAFETY ADULT - FALL  Goal: Free from fall injury  Description: INTERVENTIONS:  - Assess pt frequently for physical needs  - Identify cognitive and physical deficits and behaviors that affect risk of falls.   - [place] fall precautions as indicated by assessment.  - Educate pt/family on patient safety including physical limitations  - Instruct pt to call for assistance with activity based on assessment  - Modify environment to reduce risk of injury  - Provide assistive devices as appropriate  - Consider OT/PT consult to assist with strengthening/mobility  - Encourage toileting schedule  Outcome: Progressing     Problem: DISCHARGE PLANNING  Goal: Discharge to home or other facility with appropriate resources  Description: INTERVENTIONS:  - Identify barriers to discharge w/pt and caregiver  - Include patient/family/discharge partner in discharge planning  - Arrange for needed discharge resources and transportation as appropriate  - Identify discharge learning needs (meds, wound care, etc)  - Arrange for interpreters to assist at discharge as needed  - Consider post-discharge preferences of patient/family/discharge partner  - Complete POLST form as appropriate  - Assess patient's ability to be responsible for managing their own health  - Refer to Case Management Department for coordinating discharge planning if the patient needs post-hospital services based on physician/LIP order or complex needs related to functional status, cognitive ability or social support system  Outcome: Progressing     Problem: Altered Communication/Language Barrier  Goal: Patient/Family is able to understand and participate in their care  Description: Interventions:  - Assess communication ability and preferred communication style  - Implement communication aides and strategies  - Use visual cues when possible  - Listen attentively, be patient, do not interrupt  - Minimize distractions  - Allow time for understanding and response  - Establish method for patient to ask for assistance (call light)  - Provide an  as needed  - Communicate barriers and strategies to overcome with those who interact with patient  Outcome: Progressing

## 2022-03-16 ENCOUNTER — TELEPHONE (OUTPATIENT)
Dept: UROLOGY | Facility: CLINIC | Age: 62
End: 2022-03-16

## 2022-03-16 NOTE — TELEPHONE ENCOUNTER
TC to pt's family  Spoke w/ pt's , Jayy Umanzor  He reports pt is doing fine, sleeping comfortably  Pain well controlled w/ oral meds  No CP or SOB complaints  Catheter in place draining freely   Awaiting BM  Tolerating ambulation & PO diet   All questions answered   Call w/ s/sx of UTI   Follow up as planned, sooner prn    understands and agrees to plan

## 2022-03-16 NOTE — TELEPHONE ENCOUNTER
Patients spouse, Cristian Perez, called and LM that patient is feeling weak, has not had a BM yet, and she nearly \"passed out\" when he was helping her to the bathroom. Called back, [facility] for return call to discuss.     S/P 3-14-22 GIDEON, RONNY, IDANIA, betsyo

## 2022-03-16 NOTE — TELEPHONE ENCOUNTER
Spouse, Rhea Lamar called back, MICHELLE verified. States that pt has been in bed most of day today. Got up this am to have BM, became lightheaded/dizzy and \"fingers tingling\", returned to bed. Pt reported shortness of breath at that time but denies now. Pt was able to get up a 2nd time this afternoon and did better, but feels weak and unable to pass BM. Spouse inquiring  what to do for pt. Denies fever, dizziness, shortness of breath or chest pain at this time. Tolerating PO, ate very little today. Ate oatmeal, toast and some gatorade. No BM, taking Miralax daily. Bey draining clear, yellow urine. Pt reports low abdominal discomfort and rectal pressure, like she wants to have BM. Taking Ibuprofen, Tylenol and Tramadol on rotating schedule- helps some with pain. Denies vaginal bleeding. Denies nausea or vomiting. PLAN:  Encouraged increase PO fluids and food. Bowel management to include MOM and/or glycerin suppository-reviewed  Continue pain meds as ordered. Continue bey care as directed, void trial scheduled 3-21-22. If symptoms of chest pain, SOB, or dizziness appear, pt to proceed to ER for evaluation. Answered all questions, spouse verbalizes understanding and reviewed with pt while on phone with this RN, agreeable to plan. Pt/spouse will call with questions/concerns prn.  Dr. Dora Odom aware, no new orders

## 2022-03-17 ENCOUNTER — HOSPITAL ENCOUNTER (EMERGENCY)
Facility: HOSPITAL | Age: 62
Discharge: HOME | End: 2022-03-17
Attending: EMERGENCY MEDICINE
Payer: COMMERCIAL

## 2022-03-17 ENCOUNTER — APPOINTMENT (OUTPATIENT)
Dept: CT IMAGING | Facility: HOSPITAL | Age: 62
End: 2022-03-17
Attending: EMERGENCY MEDICINE
Payer: COMMERCIAL

## 2022-03-17 ENCOUNTER — APPOINTMENT (OUTPATIENT)
Dept: GENERAL RADIOLOGY | Facility: HOSPITAL | Age: 62
End: 2022-03-17
Attending: EMERGENCY MEDICINE
Payer: COMMERCIAL

## 2022-03-17 ENCOUNTER — TELEPHONE (OUTPATIENT)
Dept: UROLOGY | Facility: CLINIC | Age: 62
End: 2022-03-17

## 2022-03-17 VITALS
DIASTOLIC BLOOD PRESSURE: 80 MMHG | OXYGEN SATURATION: 95 % | TEMPERATURE: 97 F | SYSTOLIC BLOOD PRESSURE: 133 MMHG | BODY MASS INDEX: 23.41 KG/M2 | WEIGHT: 124 LBS | HEART RATE: 72 BPM | RESPIRATION RATE: 19 BRPM | HEIGHT: 61 IN

## 2022-03-17 DIAGNOSIS — D72.829 LEUKOCYTOSIS, UNSPECIFIED TYPE: ICD-10-CM

## 2022-03-17 DIAGNOSIS — E87.6 HYPOKALEMIA: ICD-10-CM

## 2022-03-17 DIAGNOSIS — G89.18 POST-OPERATIVE PAIN: Primary | ICD-10-CM

## 2022-03-17 LAB
ALBUMIN SERPL-MCNC: 3.6 G/DL (ref 3.4–5)
ALBUMIN/GLOB SERPL: 0.9 {RATIO} (ref 1–2)
ALP LIVER SERPL-CCNC: 104 U/L
ALT SERPL-CCNC: 25 U/L
ANION GAP SERPL CALC-SCNC: 3 MMOL/L (ref 0–18)
AST SERPL-CCNC: 24 U/L (ref 15–37)
BASOPHILS # BLD AUTO: 0.03 X10(3) UL (ref 0–0.2)
BASOPHILS NFR BLD AUTO: 0.2 %
BILIRUB SERPL-MCNC: 0.7 MG/DL (ref 0.1–2)
BILIRUB UR QL STRIP.AUTO: NEGATIVE
BUN BLD-MCNC: 18 MG/DL (ref 7–18)
CALCIUM BLD-MCNC: 8.6 MG/DL (ref 8.5–10.1)
CHLORIDE SERPL-SCNC: 94 MMOL/L (ref 98–112)
CLARITY UR REFRACT.AUTO: CLEAR
CO2 SERPL-SCNC: 37 MMOL/L (ref 21–32)
COLOR UR AUTO: YELLOW
CREAT BLD-MCNC: 0.88 MG/DL
EOSINOPHIL # BLD AUTO: 0.06 X10(3) UL (ref 0–0.7)
ERYTHROCYTE [DISTWIDTH] IN BLOOD BY AUTOMATED COUNT: 13.2 %
GLOBULIN PLAS-MCNC: 3.8 G/DL (ref 2.8–4.4)
GLUCOSE BLD-MCNC: 121 MG/DL (ref 70–99)
GLUCOSE BLD-MCNC: 124 MG/DL (ref 70–99)
GLUCOSE UR STRIP.AUTO-MCNC: NEGATIVE MG/DL
HCT VFR BLD AUTO: 43.1 %
HGB BLD-MCNC: 14.2 G/DL
IMM GRANULOCYTES # BLD AUTO: 0.04 X10(3) UL (ref 0–1)
IMM GRANULOCYTES NFR BLD: 0.3 %
INR BLD: 1.04 (ref 0.8–1.2)
KETONES UR STRIP.AUTO-MCNC: NEGATIVE MG/DL
LIPASE SERPL-CCNC: 205 U/L (ref 73–393)
LYMPHOCYTES # BLD AUTO: 3.09 X10(3) UL (ref 1–4)
LYMPHOCYTES NFR BLD AUTO: 25.3 %
MAGNESIUM SERPL-MCNC: 4.6 MG/DL (ref 1.6–2.6)
MCH RBC QN AUTO: 28.5 PG (ref 26–34)
MCHC RBC AUTO-ENTMCNC: 32.9 G/DL (ref 31–37)
MCV RBC AUTO: 86.5 FL
MONOCYTES # BLD AUTO: 0.76 X10(3) UL (ref 0.1–1)
MONOCYTES NFR BLD AUTO: 6.2 %
NEUTROPHILS # BLD AUTO: 8.24 X10 (3) UL (ref 1.5–7.7)
NEUTROPHILS # BLD AUTO: 8.24 X10(3) UL (ref 1.5–7.7)
NEUTROPHILS NFR BLD AUTO: 67.5 %
OSMOLALITY SERPL CALC.SUM OF ELEC: 281 MOSM/KG (ref 275–295)
PH UR STRIP.AUTO: 8.5 [PH] (ref 5–8)
PLATELET # BLD AUTO: 275 10(3)UL (ref 150–450)
POTASSIUM SERPL-SCNC: 2.9 MMOL/L (ref 3.5–5.1)
PROCALCITONIN SERPL-MCNC: <0.05 NG/ML (ref ?–0.16)
PROT SERPL-MCNC: 7.4 G/DL (ref 6.4–8.2)
PROT UR STRIP.AUTO-MCNC: NEGATIVE MG/DL
PROTHROMBIN TIME: 13.6 SECONDS (ref 11.6–14.8)
SODIUM SERPL-SCNC: 134 MMOL/L (ref 136–145)
SP GR UR STRIP.AUTO: 1.02 (ref 1–1.03)
TROPONIN I HIGH SENSITIVITY: 4 NG/L
TROPONIN I HIGH SENSITIVITY: 4 NG/L
UROBILINOGEN UR STRIP.AUTO-MCNC: 0.2 MG/DL
WBC # BLD AUTO: 12.2 X10(3) UL (ref 4–11)

## 2022-03-17 PROCEDURE — 80053 COMPREHEN METABOLIC PANEL: CPT | Performed by: EMERGENCY MEDICINE

## 2022-03-17 PROCEDURE — 83735 ASSAY OF MAGNESIUM: CPT | Performed by: EMERGENCY MEDICINE

## 2022-03-17 PROCEDURE — 87086 URINE CULTURE/COLONY COUNT: CPT | Performed by: EMERGENCY MEDICINE

## 2022-03-17 PROCEDURE — 85025 COMPLETE CBC W/AUTO DIFF WBC: CPT | Performed by: EMERGENCY MEDICINE

## 2022-03-17 PROCEDURE — 71045 X-RAY EXAM CHEST 1 VIEW: CPT | Performed by: EMERGENCY MEDICINE

## 2022-03-17 PROCEDURE — 96366 THER/PROPH/DIAG IV INF ADDON: CPT

## 2022-03-17 PROCEDURE — 99285 EMERGENCY DEPT VISIT HI MDM: CPT

## 2022-03-17 PROCEDURE — 81001 URINALYSIS AUTO W/SCOPE: CPT | Performed by: EMERGENCY MEDICINE

## 2022-03-17 PROCEDURE — 84484 ASSAY OF TROPONIN QUANT: CPT | Performed by: EMERGENCY MEDICINE

## 2022-03-17 PROCEDURE — 82962 GLUCOSE BLOOD TEST: CPT

## 2022-03-17 PROCEDURE — 96361 HYDRATE IV INFUSION ADD-ON: CPT

## 2022-03-17 PROCEDURE — 84145 PROCALCITONIN (PCT): CPT | Performed by: EMERGENCY MEDICINE

## 2022-03-17 PROCEDURE — 83690 ASSAY OF LIPASE: CPT | Performed by: EMERGENCY MEDICINE

## 2022-03-17 PROCEDURE — 85610 PROTHROMBIN TIME: CPT | Performed by: EMERGENCY MEDICINE

## 2022-03-17 PROCEDURE — 93010 ELECTROCARDIOGRAM REPORT: CPT

## 2022-03-17 PROCEDURE — 96365 THER/PROPH/DIAG IV INF INIT: CPT

## 2022-03-17 PROCEDURE — 96375 TX/PRO/DX INJ NEW DRUG ADDON: CPT

## 2022-03-17 PROCEDURE — 93005 ELECTROCARDIOGRAM TRACING: CPT

## 2022-03-17 PROCEDURE — 74177 CT ABD & PELVIS W/CONTRAST: CPT | Performed by: EMERGENCY MEDICINE

## 2022-03-17 RX ORDER — POTASSIUM CHLORIDE 20 MEQ/1
20 TABLET, EXTENDED RELEASE ORAL 2 TIMES DAILY
Qty: 6 TABLET | Refills: 0 | Status: SHIPPED | OUTPATIENT
Start: 2022-03-18 | End: 2022-03-21

## 2022-03-17 RX ORDER — ONDANSETRON 2 MG/ML
4 INJECTION INTRAMUSCULAR; INTRAVENOUS ONCE
Status: COMPLETED | OUTPATIENT
Start: 2022-03-17 | End: 2022-03-17

## 2022-03-17 RX ORDER — MORPHINE SULFATE 4 MG/ML
4 INJECTION, SOLUTION INTRAMUSCULAR; INTRAVENOUS ONCE
Status: DISCONTINUED | OUTPATIENT
Start: 2022-03-17 | End: 2022-03-17

## 2022-03-17 RX ORDER — POTASSIUM CHLORIDE 14.9 MG/ML
20 INJECTION INTRAVENOUS ONCE
Status: COMPLETED | OUTPATIENT
Start: 2022-03-17 | End: 2022-03-17

## 2022-03-17 RX ORDER — TRAMADOL HYDROCHLORIDE 50 MG/1
50 TABLET ORAL ONCE
Status: COMPLETED | OUTPATIENT
Start: 2022-03-17 | End: 2022-03-17

## 2022-03-17 RX ORDER — POTASSIUM CHLORIDE 20 MEQ/1
40 TABLET, EXTENDED RELEASE ORAL ONCE
Status: COMPLETED | OUTPATIENT
Start: 2022-03-17 | End: 2022-03-17

## 2022-03-17 RX ORDER — IOHEXOL 350 MG/ML
65 INJECTION, SOLUTION INTRAVENOUS
Status: COMPLETED | OUTPATIENT
Start: 2022-03-17 | End: 2022-03-17

## 2022-03-17 NOTE — TELEPHONE ENCOUNTER
TC to pt in follow up after ER visit  Spoke w/ pt's , Minus Manjinder   reports pt is feeling better  Light headedness has improved  C/o pain - has not had any pain medicine since this morning   Encouraged to take tramdol 50mg once home, then work to get back on pain management schedule w/ IBP, tramadol, and tylenol  Catheter in place draining freely   Denies cloudy/malodorous urine  Will follow up by phone tomorrow, sooner prn   All questions answered   Family understands and agrees to plan

## 2022-03-17 NOTE — TELEPHONE ENCOUNTER
TC to pt following surgery  Spoke with pt's   They went to ER because she was feeling weak and \"almost passed out\"  At the ER now  +BM  Waiting for results  Will follow ER evaluation  Follow up as scheduled for catheter removal

## 2022-03-18 LAB
ATRIAL RATE: 66 BPM
P AXIS: 71 DEGREES
P-R INTERVAL: 168 MS
Q-T INTERVAL: 440 MS
QRS DURATION: 78 MS
QTC CALCULATION (BEZET): 461 MS
R AXIS: 60 DEGREES
T AXIS: 60 DEGREES
VENTRICULAR RATE: 66 BPM

## 2022-03-19 NOTE — TELEPHONE ENCOUNTER
TC to pt following surgery  Spoke with her   Doing well, felling better  Cath draining, void trial monday.  +bm  Pain controlled with PO meds (IBP & tramadol & tylenol)  ucx neg, results d/w family  Reviewed bowel mgmt and activity restrictions  Tolerates ambulation better  No heavy vaginal bleeding, some spotting  All questions answered  Encouraged her to call with questions or concerns  Follow up as scheduled  She understands and agrees to plan

## 2022-03-21 ENCOUNTER — OFFICE VISIT (OUTPATIENT)
Dept: UROLOGY | Facility: CLINIC | Age: 62
End: 2022-03-21
Attending: OBSTETRICS & GYNECOLOGY
Payer: COMMERCIAL

## 2022-03-21 VITALS — TEMPERATURE: 98 F | HEIGHT: 61 IN | WEIGHT: 124 LBS | BODY MASS INDEX: 23.41 KG/M2

## 2022-03-21 DIAGNOSIS — Z98.890 POSTOPERATIVE STATE: Primary | ICD-10-CM

## 2022-03-21 PROCEDURE — 99212 OFFICE O/P EST SF 10 MIN: CPT

## 2022-03-21 NOTE — PROGRESS NOTES
Called to assess voiding. Left message for patient's  to call if any questions as he has interpreted for her.

## 2022-03-21 NOTE — PROCEDURES
Patient here for voiding trial.   Procedure Date: 3/14/2022  Procedure Name: Anterior/Posterior/Enterocele Repair, Cystoscopy, Uterosacral Ligament Suspension and Vaginal Hysterectomy  Doing well no complaints  BMs regular  Using Benefiber and Miralax for bowel mgmt  Pain  Using Ibuprofen, Tramadol and Other Tylenol  for pain mgmt  Tolerates ambulation & diet     Gen: NAD  Pulm:nl effort  : No active bleeding. Discharge scant pink, Surgical sites-WNL, Bey intact and draining clear yellow urine. Disconnected bey catheter from drainage tubing. Using a 60 cc Mayra syringe, 250ml sterile water was instilled per gravity, balloon deflated using 10 cc syringe, and catheter removed. Pt up to bathroom and voided 300mL. Patient passes voiding trial.  Patient  tolerated procedure well. Reviewed post op restrictions and bowel management  Reviewed signs and sx of urinary retention and UTI. Discussed return to work/activity plans  Follow-up with Dr. Chary Spann on April 5.      All questions answered  She understands and agrees to plan

## 2022-03-21 NOTE — PATIENT INSTRUCTIONS
Voiding Trial Instructions  You have passed your voiding trial at 8:30. Please make sure you are drinking some water today. You can take your Motrin to help with any swelling from the catheter. It is important to try and empty your bladder every two hours during the day. Try and empty again at 10:30. If you are unable to empty, try and drink a glass of water and try again 30-60 minutes later. If you have been unable to empty your bladder by 12:30, which is 4 hours after leaving the office, you will most likely be uncomfortable and you will need to come back into the office. If it is after 4pm, go to an urgent care or emergency room to have a catheter placed again. Please call our office at [phone] if you have any questions or concerns.

## 2022-03-25 ENCOUNTER — NURSE ONLY (OUTPATIENT)
Dept: UROLOGY | Facility: CLINIC | Age: 62
End: 2022-03-25
Attending: OBSTETRICS & GYNECOLOGY
Payer: COMMERCIAL

## 2022-03-25 VITALS
DIASTOLIC BLOOD PRESSURE: 66 MMHG | TEMPERATURE: 98 F | HEART RATE: 78 BPM | RESPIRATION RATE: 16 BRPM | SYSTOLIC BLOOD PRESSURE: 118 MMHG

## 2022-03-25 DIAGNOSIS — R35.0 FREQUENCY OF URINATION: Primary | ICD-10-CM

## 2022-03-25 PROCEDURE — 51701 INSERT BLADDER CATHETER: CPT

## 2022-03-25 PROCEDURE — 87086 URINE CULTURE/COLONY COUNT: CPT

## 2022-03-25 RX ORDER — POLYETHYLENE GLYCOL 3350 17 G/17G
17 POWDER, FOR SOLUTION ORAL DAILY
COMMUNITY

## 2022-03-25 RX ORDER — NITROFURANTOIN 25; 75 MG/1; MG/1
100 CAPSULE ORAL 2 TIMES DAILY
Qty: 14 CAPSULE | Refills: 0 | Status: SHIPPED | OUTPATIENT
Start: 2022-03-25 | End: 2022-04-01

## 2022-03-25 NOTE — PROGRESS NOTES
Patient s/p  3/14/22 TOTAL VAGINAL HYSTERECTOMY, UTEROSACRAL VAGINAL VAULT SUSPENSION, ANTERIOR, POSTERIOR, ENTEROCELE REPAIR, cysto - PASSED voiding trial on 3/21/11 -  reports frequency past 2 days - unsure if she is emptying completely - feels slight dysuria , but frequency is most noted - Denies UUI, ALISSA - Feels bloated , but bowels just started improving past 2 days with use of Miralax  - Pain controlled with Tylenol and Ibuprofen -  Measured void in BR - 95 ml - PVR of 45 mL - clear urine   ZAHIDA - Dr. Judi Rosa -  Urine culture - start MACROBID 100 mg PO one BID x 7 days - follow culrure  Patient and  verbalize understanding of above and will call sooner if needed

## 2022-03-27 ENCOUNTER — TELEPHONE (OUTPATIENT)
Dept: UROLOGY | Facility: CLINIC | Age: 62
End: 2022-03-27

## 2022-03-27 NOTE — TELEPHONE ENCOUNTER
TC to pt w/ test results  Spoke to pt's   Informed of negative ucx, pt to stop macrobid   Reports voiding freely, denies sense of incomplete bladder emptying  Bowels regular  No pain   Doing well   CPM  All questions answered  Call w/ s/sx of UTI   Follow up as planned, sooner prn   Family understands and agrees to plan

## 2022-04-05 ENCOUNTER — OFFICE VISIT (OUTPATIENT)
Dept: UROLOGY | Facility: CLINIC | Age: 62
End: 2022-04-05
Attending: OBSTETRICS & GYNECOLOGY
Payer: MEDICAID

## 2022-04-05 VITALS — HEIGHT: 61 IN | BODY MASS INDEX: 23.41 KG/M2 | TEMPERATURE: 98 F | WEIGHT: 124 LBS

## 2022-04-05 DIAGNOSIS — Z98.890 POSTOPERATIVE STATE: Primary | ICD-10-CM

## 2022-04-05 PROCEDURE — 99212 OFFICE O/P EST SF 10 MIN: CPT

## 2022-04-19 ENCOUNTER — TELEPHONE (OUTPATIENT)
Dept: UROLOGY | Facility: CLINIC | Age: 62
End: 2022-04-19

## 2022-05-17 ENCOUNTER — TELEPHONE (OUTPATIENT)
Dept: UROLOGY | Facility: CLINIC | Age: 62
End: 2022-05-17

## 2022-05-17 RX ORDER — CIPROFLOXACIN 500 MG/1
500 TABLET, FILM COATED ORAL EVERY 12 HOURS
COMMUNITY
Start: 2022-05-13 | End: 2022-05-26

## 2022-05-17 NOTE — TELEPHONE ENCOUNTER
S/P 3-14-22-- , USLS,  APELPIDIO, cysto   Spouse, Boris, calls today, states pt is voiding dark urine, \"almost black\". She went to PCP office today, was told she has a UTI and prescribed Cipro. Reports low abdominal discomfort. +Low back pain  Spouse unsure about urinary sx. Denies fever. Encouraged increased PO fluids. Pt to call with update on 5-19-22, discussed need for culture results. He verbalizes understanding.

## 2022-05-18 ENCOUNTER — HOSPITAL ENCOUNTER (INPATIENT)
Facility: HOSPITAL | Age: 62
LOS: 8 days | Discharge: HOME HEALTH | End: 2022-05-26
Attending: EMERGENCY MEDICINE | Admitting: INTERNAL MEDICINE
Payer: COMMERCIAL

## 2022-05-18 ENCOUNTER — ANESTHESIA EVENT (OUTPATIENT)
Dept: ENDOSCOPY | Facility: HOSPITAL | Age: 62
End: 2022-05-18
Payer: COMMERCIAL

## 2022-05-18 ENCOUNTER — APPOINTMENT (OUTPATIENT)
Dept: CT IMAGING | Facility: HOSPITAL | Age: 62
End: 2022-05-18
Attending: EMERGENCY MEDICINE
Payer: COMMERCIAL

## 2022-05-18 DIAGNOSIS — K86.89 PANCREATIC MASS: ICD-10-CM

## 2022-05-18 DIAGNOSIS — C78.7 PANCREATIC CANCER METASTASIZED TO LIVER (HCC): ICD-10-CM

## 2022-05-18 DIAGNOSIS — K83.1 BILIARY OBSTRUCTION: ICD-10-CM

## 2022-05-18 DIAGNOSIS — C25.9 PANCREATIC CANCER METASTASIZED TO LIVER (HCC): ICD-10-CM

## 2022-05-18 DIAGNOSIS — R10.9 ABDOMINAL PAIN, ACUTE: Primary | ICD-10-CM

## 2022-05-18 DIAGNOSIS — R79.89 LFT ELEVATION: ICD-10-CM

## 2022-05-18 PROBLEM — E78.00 PURE HYPERCHOLESTEROLEMIA: Chronic | Status: ACTIVE | Noted: 2022-05-18

## 2022-05-18 PROBLEM — I10 PRIMARY HYPERTENSION: Chronic | Status: ACTIVE | Noted: 2022-05-18

## 2022-05-18 LAB
ALBUMIN SERPL-MCNC: 3.4 G/DL (ref 3.4–5)
ALBUMIN/GLOB SERPL: 0.9 {RATIO} (ref 1–2)
ALP LIVER SERPL-CCNC: 429 U/L
ALT SERPL-CCNC: 316 U/L
ANION GAP SERPL CALC-SCNC: 7 MMOL/L (ref 0–18)
APTT PPP: 24.1 SECONDS (ref 23.3–35.6)
AST SERPL-CCNC: 178 U/L (ref 15–37)
BASOPHILS # BLD AUTO: 0.05 X10(3) UL (ref 0–0.2)
BASOPHILS NFR BLD AUTO: 0.6 %
BILIRUB SERPL-MCNC: 9.8 MG/DL (ref 0.1–2)
BILIRUB UR QL CFM: POSITIVE
BUN BLD-MCNC: 13 MG/DL (ref 7–18)
CALCIUM BLD-MCNC: 8.9 MG/DL (ref 8.5–10.1)
CANCER AG19-9 SERPL-ACNC: 86.2 U/ML (ref ?–37)
CHLORIDE SERPL-SCNC: 104 MMOL/L (ref 98–112)
CO2 SERPL-SCNC: 28 MMOL/L (ref 21–32)
CREAT BLD-MCNC: 0.67 MG/DL
EOSINOPHIL # BLD AUTO: 0.15 X10(3) UL (ref 0–0.7)
EOSINOPHIL NFR BLD AUTO: 1.9 %
ERYTHROCYTE [DISTWIDTH] IN BLOOD BY AUTOMATED COUNT: 16 %
GLOBULIN PLAS-MCNC: 3.9 G/DL (ref 2.8–4.4)
GLUCOSE BLD-MCNC: 119 MG/DL (ref 70–99)
GLUCOSE UR STRIP.AUTO-MCNC: NEGATIVE MG/DL
HCT VFR BLD AUTO: 39.4 %
HGB BLD-MCNC: 13 G/DL
HYALINE CASTS #/AREA URNS AUTO: PRESENT /LPF
IMM GRANULOCYTES # BLD AUTO: 0.03 X10(3) UL (ref 0–1)
IMM GRANULOCYTES NFR BLD: 0.4 %
INR BLD: 0.96 (ref 0.8–1.2)
KETONES UR STRIP.AUTO-MCNC: 20 MG/DL
LIPASE SERPL-CCNC: 243 U/L (ref 73–393)
LYMPHOCYTES # BLD AUTO: 1.97 X10(3) UL (ref 1–4)
LYMPHOCYTES NFR BLD AUTO: 25.3 %
MCH RBC QN AUTO: 29.1 PG (ref 26–34)
MCHC RBC AUTO-ENTMCNC: 33 G/DL (ref 31–37)
MCV RBC AUTO: 88.3 FL
MONOCYTES # BLD AUTO: 0.62 X10(3) UL (ref 0.1–1)
MONOCYTES NFR BLD AUTO: 8 %
NEUTROPHILS # BLD AUTO: 4.97 X10 (3) UL (ref 1.5–7.7)
NEUTROPHILS # BLD AUTO: 4.97 X10(3) UL (ref 1.5–7.7)
NEUTROPHILS NFR BLD AUTO: 63.8 %
NITRITE UR QL STRIP.AUTO: NEGATIVE
OSMOLALITY SERPL CALC.SUM OF ELEC: 289 MOSM/KG (ref 275–295)
PH UR STRIP.AUTO: 5 [PH] (ref 5–8)
PLATELET # BLD AUTO: 277 10(3)UL (ref 150–450)
POTASSIUM SERPL-SCNC: 2.8 MMOL/L (ref 3.5–5.1)
POTASSIUM SERPL-SCNC: 3.6 MMOL/L (ref 3.5–5.1)
PROT SERPL-MCNC: 7.3 G/DL (ref 6.4–8.2)
PROT UR STRIP.AUTO-MCNC: NEGATIVE MG/DL
PROTHROMBIN TIME: 12.8 SECONDS (ref 11.6–14.8)
RBC # BLD AUTO: 4.46 X10(6)UL
RBC UR QL AUTO: NEGATIVE
SARS-COV-2 RNA RESP QL NAA+PROBE: NOT DETECTED
SODIUM SERPL-SCNC: 139 MMOL/L (ref 136–145)
SP GR UR STRIP.AUTO: 1.01 (ref 1–1.03)
UROBILINOGEN UR STRIP.AUTO-MCNC: 2 MG/DL
WBC # BLD AUTO: 7.8 X10(3) UL (ref 4–11)

## 2022-05-18 PROCEDURE — 99223 1ST HOSP IP/OBS HIGH 75: CPT | Performed by: HOSPITALIST

## 2022-05-18 PROCEDURE — 74177 CT ABD & PELVIS W/CONTRAST: CPT | Performed by: EMERGENCY MEDICINE

## 2022-05-18 RX ORDER — HYDROMORPHONE HYDROCHLORIDE 1 MG/ML
0.8 INJECTION, SOLUTION INTRAMUSCULAR; INTRAVENOUS; SUBCUTANEOUS EVERY 2 HOUR PRN
Status: DISCONTINUED | OUTPATIENT
Start: 2022-05-18 | End: 2022-05-23

## 2022-05-18 RX ORDER — CONJUGATED ESTROGENS 0.62 MG/G
CREAM VAGINAL
COMMUNITY
Start: 2022-05-16

## 2022-05-18 RX ORDER — PANTOPRAZOLE SODIUM 40 MG/1
40 TABLET, DELAYED RELEASE ORAL
Status: DISCONTINUED | OUTPATIENT
Start: 2022-05-18 | End: 2022-05-26

## 2022-05-18 RX ORDER — ACETAMINOPHEN 325 MG/1
650 TABLET ORAL EVERY 6 HOURS PRN
Status: DISCONTINUED | OUTPATIENT
Start: 2022-05-18 | End: 2022-05-18

## 2022-05-18 RX ORDER — IOHEXOL 350 MG/ML
80 INJECTION, SOLUTION INTRAVENOUS
Status: COMPLETED | OUTPATIENT
Start: 2022-05-18 | End: 2022-05-18

## 2022-05-18 RX ORDER — ONDANSETRON 2 MG/ML
4 INJECTION INTRAMUSCULAR; INTRAVENOUS EVERY 4 HOURS PRN
Status: DISCONTINUED | OUTPATIENT
Start: 2022-05-18 | End: 2022-05-18

## 2022-05-18 RX ORDER — PROCHLORPERAZINE EDISYLATE 5 MG/ML
5 INJECTION INTRAMUSCULAR; INTRAVENOUS EVERY 8 HOURS PRN
Status: DISCONTINUED | OUTPATIENT
Start: 2022-05-18 | End: 2022-05-26

## 2022-05-18 RX ORDER — SODIUM CHLORIDE 9 MG/ML
1000 INJECTION, SOLUTION INTRAVENOUS ONCE
Status: COMPLETED | OUTPATIENT
Start: 2022-05-18 | End: 2022-05-18

## 2022-05-18 RX ORDER — ONDANSETRON 2 MG/ML
4 INJECTION INTRAMUSCULAR; INTRAVENOUS EVERY 6 HOURS PRN
Status: DISCONTINUED | OUTPATIENT
Start: 2022-05-18 | End: 2022-05-26

## 2022-05-18 RX ORDER — FLUTICASONE PROPIONATE 50 MCG
1 SPRAY, SUSPENSION (ML) NASAL
COMMUNITY
Start: 2022-04-29

## 2022-05-18 RX ORDER — HYDROMORPHONE HYDROCHLORIDE 1 MG/ML
0.2 INJECTION, SOLUTION INTRAMUSCULAR; INTRAVENOUS; SUBCUTANEOUS EVERY 2 HOUR PRN
Status: DISCONTINUED | OUTPATIENT
Start: 2022-05-18 | End: 2022-05-23

## 2022-05-18 RX ORDER — ROSUVASTATIN CALCIUM 20 MG/1
20 TABLET, COATED ORAL NIGHTLY
Status: DISCONTINUED | OUTPATIENT
Start: 2022-05-18 | End: 2022-05-26

## 2022-05-18 RX ORDER — HEPARIN SODIUM 5000 [USP'U]/ML
5000 INJECTION, SOLUTION INTRAVENOUS; SUBCUTANEOUS EVERY 8 HOURS SCHEDULED
Status: DISCONTINUED | OUTPATIENT
Start: 2022-05-18 | End: 2022-05-26

## 2022-05-18 RX ORDER — LORAZEPAM 2 MG/ML
0.5 INJECTION INTRAMUSCULAR EVERY 6 HOURS PRN
Status: DISCONTINUED | OUTPATIENT
Start: 2022-05-18 | End: 2022-05-26

## 2022-05-18 RX ORDER — ONDANSETRON 2 MG/ML
4 INJECTION INTRAMUSCULAR; INTRAVENOUS EVERY 6 HOURS PRN
Status: DISCONTINUED | OUTPATIENT
Start: 2022-05-18 | End: 2022-05-18

## 2022-05-18 RX ORDER — DICYCLOMINE HCL 20 MG
20 TABLET ORAL 3 TIMES DAILY
Status: DISCONTINUED | OUTPATIENT
Start: 2022-05-18 | End: 2022-05-26

## 2022-05-18 RX ORDER — GABAPENTIN 300 MG/1
300 CAPSULE ORAL NIGHTLY
Status: DISCONTINUED | OUTPATIENT
Start: 2022-05-18 | End: 2022-05-18

## 2022-05-18 RX ORDER — SODIUM CHLORIDE 9 MG/ML
INJECTION, SOLUTION INTRAVENOUS CONTINUOUS
Status: ACTIVE | OUTPATIENT
Start: 2022-05-18 | End: 2022-05-18

## 2022-05-18 RX ORDER — ALPRAZOLAM 0.25 MG/1
0.25 TABLET ORAL EVERY 4 HOURS PRN
Status: DISCONTINUED | OUTPATIENT
Start: 2022-05-18 | End: 2022-05-26

## 2022-05-18 RX ORDER — ACETAMINOPHEN 325 MG/1
650 TABLET ORAL EVERY 6 HOURS PRN
Status: DISCONTINUED | OUTPATIENT
Start: 2022-05-18 | End: 2022-05-26

## 2022-05-18 RX ORDER — MELATONIN
3 NIGHTLY PRN
Status: DISCONTINUED | OUTPATIENT
Start: 2022-05-18 | End: 2022-05-18

## 2022-05-18 RX ORDER — ONDANSETRON 2 MG/ML
4 INJECTION INTRAMUSCULAR; INTRAVENOUS ONCE
Status: COMPLETED | OUTPATIENT
Start: 2022-05-18 | End: 2022-05-18

## 2022-05-18 RX ORDER — ESCITALOPRAM OXALATE 10 MG/1
10 TABLET ORAL DAILY
Status: DISCONTINUED | OUTPATIENT
Start: 2022-05-18 | End: 2022-05-26

## 2022-05-18 RX ORDER — SODIUM CHLORIDE 9 MG/ML
INJECTION, SOLUTION INTRAVENOUS CONTINUOUS
Status: DISCONTINUED | OUTPATIENT
Start: 2022-05-18 | End: 2022-05-20

## 2022-05-18 RX ORDER — HYDROXYZINE HYDROCHLORIDE 25 MG/1
25 TABLET, FILM COATED ORAL 3 TIMES DAILY PRN
Status: DISCONTINUED | OUTPATIENT
Start: 2022-05-18 | End: 2022-05-26

## 2022-05-18 RX ORDER — LORATADINE 10 MG/1
10 TABLET ORAL DAILY PRN
COMMUNITY
Start: 2022-05-13 | End: 2022-05-26

## 2022-05-18 RX ORDER — MELATONIN
3 NIGHTLY PRN
Status: DISCONTINUED | OUTPATIENT
Start: 2022-05-18 | End: 2022-05-26

## 2022-05-18 RX ORDER — AMLODIPINE BESYLATE 5 MG/1
5 TABLET ORAL NIGHTLY
Status: DISCONTINUED | OUTPATIENT
Start: 2022-05-18 | End: 2022-05-25

## 2022-05-18 RX ORDER — HYDROMORPHONE HYDROCHLORIDE 1 MG/ML
0.4 INJECTION, SOLUTION INTRAMUSCULAR; INTRAVENOUS; SUBCUTANEOUS EVERY 2 HOUR PRN
Status: DISCONTINUED | OUTPATIENT
Start: 2022-05-18 | End: 2022-05-23

## 2022-05-18 NOTE — PROGRESS NOTES
05/18/22 7225   Clinical Encounter Type   Visited With Patient and family together  (Pt spoke limited English, however her spouse interrupted for her. Spouse shared that pt will be having a procedure to remove a mass from the liver)   Routine Visit Follow-up   Continue Visiting No   Surgical Visit Pre-op  (Ot was able to acknowledeged that she is fearful. With a gentle touch  held the Pt's hand and prayed at her bedside)   Patient Spiritual Encounters   Spiritual Assessment Completed Yes   Family Spiritual Encounters   Family Coping Accepting; Fearful  (Despite being accepting of the current condition the Pt acknowledged that she is fearfu)   Family Participation in Care Consistently  (Spouse was at the Pt's bedside, and was the interruptor. Pt has limited [place] skills)   Family Support During Treatment Consistently   Taxonomy   Intended Effects Roberta affirmation   Methods Explore nature of God;Explore presence of God   Interventions Active listening; Ask guided questions about roberta;Prayer for healing;Provide compassionate touch;Provide Cheondoism music   The Pt was not able to communicate fully in [place]. The  was the . He shared that the wife communicated that she was fears of the procedure.  held the Pt's  hand and communicated through the   ( ) to let the Pt know that she was in the hands of a good team of medical doctors and nurses who were going to take good care of her. As requested the  prayed at the Pt's bedside.  also told the  to request another visit through the nurse or call the On-Call  at pager number [phone] if needed.

## 2022-05-18 NOTE — ED INITIAL ASSESSMENT (HPI)
LUQ pain started three days with n/v   epigastric pain that has been ongoing for over a month, fatigued. Reports coloa colored urine over the last four days.  jaundince     Denies recent fevers/diahrea

## 2022-05-18 NOTE — ED QUICK NOTES
Orders for admission, patient is aware of plan and ready to go upstairs. Any questions, please call ED RN Jordi Lopez at extension [phone]. Vaccinated?  Unknown  Type of COVID test sent: Rapid - Not detected  COVID Suspicion level: Low      Titratable drug(s) infusing:  Rate:    LOC at time of transport: A&Ox4    Other pertinent information:    CIWA score=  NIH score=

## 2022-05-18 NOTE — ED QUICK NOTES
Pt ambulated to the bathroom with . States she has no pain, \"just tired. \" Pt and  aware of the POC

## 2022-05-18 NOTE — PLAN OF CARE
Patient A&O x4, Martiniquais speaking, some english,  has been translating. No c/o pain at this time, just very anxious, and states scared to take pain meds. HX in march of pancreatic mass and it appears the mass has gotten worse per recent CT and CT in March. Patient has been fatigued and to tired to do much per patient and . She is Jaundice. She is able to ambulate and the  is very helpful. Currently NPO per hospitalist with consult sent to Dr Dez Ojeda group.

## 2022-05-18 NOTE — PLAN OF CARE
Assumed care at 0700  Patient alert, oriented x4  Denies need for pain medication  Denies nausea  Tolerating diet  GI Consulted  IV fluids infusing    EUS tomorrow. NPO @ midnight.  Consent signed and in chart

## 2022-05-19 ENCOUNTER — ANESTHESIA (OUTPATIENT)
Dept: ENDOSCOPY | Facility: HOSPITAL | Age: 62
End: 2022-05-19
Payer: COMMERCIAL

## 2022-05-19 ENCOUNTER — APPOINTMENT (OUTPATIENT)
Dept: GENERAL RADIOLOGY | Facility: HOSPITAL | Age: 62
End: 2022-05-19
Attending: INTERNAL MEDICINE
Payer: COMMERCIAL

## 2022-05-19 LAB
ALBUMIN SERPL-MCNC: 2.8 G/DL (ref 3.4–5)
ALBUMIN/GLOB SERPL: 0.8 {RATIO} (ref 1–2)
ALP LIVER SERPL-CCNC: 392 U/L
ALT SERPL-CCNC: 315 U/L
ANION GAP SERPL CALC-SCNC: 6 MMOL/L (ref 0–18)
AST SERPL-CCNC: 227 U/L (ref 15–37)
BASOPHILS # BLD AUTO: 0.07 X10(3) UL (ref 0–0.2)
BASOPHILS NFR BLD AUTO: 1.1 %
BILIRUB SERPL-MCNC: 10.8 MG/DL (ref 0.1–2)
BUN BLD-MCNC: 7 MG/DL (ref 7–18)
CALCIUM BLD-MCNC: 8.3 MG/DL (ref 8.5–10.1)
CHLORIDE SERPL-SCNC: 105 MMOL/L (ref 98–112)
CO2 SERPL-SCNC: 29 MMOL/L (ref 21–32)
CREAT BLD-MCNC: 0.52 MG/DL
EOSINOPHIL # BLD AUTO: 0.18 X10(3) UL (ref 0–0.7)
EOSINOPHIL NFR BLD AUTO: 2.8 %
ERYTHROCYTE [DISTWIDTH] IN BLOOD BY AUTOMATED COUNT: 16.4 %
GLOBULIN PLAS-MCNC: 3.3 G/DL (ref 2.8–4.4)
GLUCOSE BLD-MCNC: 124 MG/DL (ref 70–99)
HCT VFR BLD AUTO: 36.8 %
HGB BLD-MCNC: 12.2 G/DL
IMM GRANULOCYTES # BLD AUTO: 0.03 X10(3) UL (ref 0–1)
IMM GRANULOCYTES NFR BLD: 0.5 %
INR BLD: 0.96 (ref 0.8–1.2)
LYMPHOCYTES # BLD AUTO: 1.55 X10(3) UL (ref 1–4)
LYMPHOCYTES NFR BLD AUTO: 24.2 %
MCH RBC QN AUTO: 28.8 PG (ref 26–34)
MCHC RBC AUTO-ENTMCNC: 33.2 G/DL (ref 31–37)
MCV RBC AUTO: 87 FL
MONOCYTES # BLD AUTO: 0.65 X10(3) UL (ref 0.1–1)
MONOCYTES NFR BLD AUTO: 10.2 %
NEUTROPHILS # BLD AUTO: 3.92 X10 (3) UL (ref 1.5–7.7)
NEUTROPHILS # BLD AUTO: 3.92 X10(3) UL (ref 1.5–7.7)
NEUTROPHILS NFR BLD AUTO: 61.2 %
OSMOLALITY SERPL CALC.SUM OF ELEC: 289 MOSM/KG (ref 275–295)
PLATELET # BLD AUTO: 281 10(3)UL (ref 150–450)
POTASSIUM SERPL-SCNC: 3 MMOL/L (ref 3.5–5.1)
POTASSIUM SERPL-SCNC: 3.8 MMOL/L (ref 3.5–5.1)
PROT SERPL-MCNC: 6.1 G/DL (ref 6.4–8.2)
PROTHROMBIN TIME: 12.8 SECONDS (ref 11.6–14.8)
RBC # BLD AUTO: 4.23 X10(6)UL
SODIUM SERPL-SCNC: 140 MMOL/L (ref 136–145)
WBC # BLD AUTO: 6.4 X10(3) UL (ref 4–11)

## 2022-05-19 PROCEDURE — 99232 SBSQ HOSP IP/OBS MODERATE 35: CPT | Performed by: INTERNAL MEDICINE

## 2022-05-19 PROCEDURE — 74328 X-RAY BILE DUCT ENDOSCOPY: CPT | Performed by: INTERNAL MEDICINE

## 2022-05-19 PROCEDURE — BF47ZZZ ULTRASONOGRAPHY OF PANCREAS: ICD-10-PCS | Performed by: INTERNAL MEDICINE

## 2022-05-19 PROCEDURE — 0F904ZX DRAINAGE OF LIVER, PERCUTANEOUS ENDOSCOPIC APPROACH, DIAGNOSTIC: ICD-10-PCS | Performed by: INTERNAL MEDICINE

## 2022-05-19 PROCEDURE — 0F9G8ZX DRAINAGE OF PANCREAS, VIA NATURAL OR ARTIFICIAL OPENING ENDOSCOPIC, DIAGNOSTIC: ICD-10-PCS | Performed by: INTERNAL MEDICINE

## 2022-05-19 PROCEDURE — 0F798DZ DILATION OF COMMON BILE DUCT WITH INTRALUMINAL DEVICE, VIA NATURAL OR ARTIFICIAL OPENING ENDOSCOPIC: ICD-10-PCS | Performed by: INTERNAL MEDICINE

## 2022-05-19 DEVICE — STENT SYSTEM
Type: IMPLANTABLE DEVICE | Site: BILIARY | Status: FUNCTIONAL
Brand: WALLFLEX™ BILIARY

## 2022-05-19 RX ORDER — LIDOCAINE HYDROCHLORIDE 10 MG/ML
INJECTION, SOLUTION EPIDURAL; INFILTRATION; INTRACAUDAL; PERINEURAL AS NEEDED
Status: DISCONTINUED | OUTPATIENT
Start: 2022-05-19 | End: 2022-05-19 | Stop reason: SURG

## 2022-05-19 RX ORDER — MIDAZOLAM HYDROCHLORIDE 1 MG/ML
INJECTION INTRAMUSCULAR; INTRAVENOUS AS NEEDED
Status: DISCONTINUED | OUTPATIENT
Start: 2022-05-19 | End: 2022-05-19 | Stop reason: SURG

## 2022-05-19 RX ORDER — PHENYLEPHRINE HCL 10 MG/ML
VIAL (ML) INJECTION AS NEEDED
Status: DISCONTINUED | OUTPATIENT
Start: 2022-05-19 | End: 2022-05-19 | Stop reason: SURG

## 2022-05-19 RX ORDER — ONDANSETRON 2 MG/ML
INJECTION INTRAMUSCULAR; INTRAVENOUS AS NEEDED
Status: DISCONTINUED | OUTPATIENT
Start: 2022-05-19 | End: 2022-05-19 | Stop reason: SURG

## 2022-05-19 RX ORDER — POTASSIUM CHLORIDE 20 MEQ/1
40 TABLET, EXTENDED RELEASE ORAL EVERY 4 HOURS
Status: DISCONTINUED | OUTPATIENT
Start: 2022-05-19 | End: 2022-05-19

## 2022-05-19 RX ORDER — SODIUM CHLORIDE, SODIUM LACTATE, POTASSIUM CHLORIDE, CALCIUM CHLORIDE 600; 310; 30; 20 MG/100ML; MG/100ML; MG/100ML; MG/100ML
INJECTION, SOLUTION INTRAVENOUS CONTINUOUS PRN
Status: DISCONTINUED | OUTPATIENT
Start: 2022-05-19 | End: 2022-05-19 | Stop reason: SURG

## 2022-05-19 RX ADMIN — LIDOCAINE HYDROCHLORIDE 50 MG: 10 INJECTION, SOLUTION EPIDURAL; INFILTRATION; INTRACAUDAL; PERINEURAL at 17:30:00

## 2022-05-19 RX ADMIN — PHENYLEPHRINE HCL 100 MCG: 10 MG/ML VIAL (ML) INJECTION at 18:10:00

## 2022-05-19 RX ADMIN — ONDANSETRON 4 MG: 2 INJECTION INTRAMUSCULAR; INTRAVENOUS at 17:35:00

## 2022-05-19 RX ADMIN — MIDAZOLAM HYDROCHLORIDE 2 MG: 1 INJECTION INTRAMUSCULAR; INTRAVENOUS at 17:25:00

## 2022-05-19 RX ADMIN — SODIUM CHLORIDE, SODIUM LACTATE, POTASSIUM CHLORIDE, CALCIUM CHLORIDE: 600; 310; 30; 20 INJECTION, SOLUTION INTRAVENOUS at 17:25:00

## 2022-05-19 NOTE — TELEPHONE ENCOUNTER
Called pt via [phone] [facility] , Richie Whitehead, #[phone] for a condition update, Khoi Shelton.  PEND for patient call back

## 2022-05-19 NOTE — PLAN OF CARE
Assumed care at 0700  Patient alert, oriented x4  Denies abd pain, nausea  Potassium replaced per protocol  IV fluids infusing  EUS and ERCP completed    Plan of care discussed with patient and spouse

## 2022-05-19 NOTE — PROGRESS NOTES
Assumed care of pt at [address]. Alert, oriented x4. Primarily Kazakh speaking, spouse at bedside and translating. On RA. NSR on tele. Reports fullness after eating a small amount of dinner. Denies any pain or nausea. NPO since midnight for procedure today.

## 2022-05-20 ENCOUNTER — APPOINTMENT (OUTPATIENT)
Dept: CT IMAGING | Facility: HOSPITAL | Age: 62
End: 2022-05-20
Attending: INTERNAL MEDICINE
Payer: COMMERCIAL

## 2022-05-20 LAB
ALBUMIN SERPL-MCNC: 2.7 G/DL (ref 3.4–5)
ALBUMIN/GLOB SERPL: 0.7 {RATIO} (ref 1–2)
ALP LIVER SERPL-CCNC: 428 U/L
ALT SERPL-CCNC: 307 U/L
ANION GAP SERPL CALC-SCNC: 8 MMOL/L (ref 0–18)
AST SERPL-CCNC: 182 U/L (ref 15–37)
BASOPHILS # BLD AUTO: 0.02 X10(3) UL (ref 0–0.2)
BASOPHILS NFR BLD AUTO: 0.3 %
BILIRUB SERPL-MCNC: 11 MG/DL (ref 0.1–2)
BUN BLD-MCNC: 15 MG/DL (ref 7–18)
CALCIUM BLD-MCNC: 8.3 MG/DL (ref 8.5–10.1)
CHLORIDE SERPL-SCNC: 106 MMOL/L (ref 98–112)
CO2 SERPL-SCNC: 25 MMOL/L (ref 21–32)
CREAT BLD-MCNC: 0.59 MG/DL
EOSINOPHIL # BLD AUTO: 0 X10(3) UL (ref 0–0.7)
EOSINOPHIL NFR BLD AUTO: 0 %
ERYTHROCYTE [DISTWIDTH] IN BLOOD BY AUTOMATED COUNT: 16.7 %
GLOBULIN PLAS-MCNC: 3.7 G/DL (ref 2.8–4.4)
GLUCOSE BLD-MCNC: 120 MG/DL (ref 70–99)
HCT VFR BLD AUTO: 37.2 %
HGB BLD-MCNC: 12.4 G/DL
IMM GRANULOCYTES # BLD AUTO: 0.05 X10(3) UL (ref 0–1)
IMM GRANULOCYTES NFR BLD: 0.6 %
LIPASE SERPL-CCNC: 1300 U/L (ref 73–393)
LYMPHOCYTES # BLD AUTO: 0.9 X10(3) UL (ref 1–4)
LYMPHOCYTES NFR BLD AUTO: 11.5 %
MCH RBC QN AUTO: 28.8 PG (ref 26–34)
MCHC RBC AUTO-ENTMCNC: 33.3 G/DL (ref 31–37)
MCV RBC AUTO: 86.3 FL
MONOCYTES # BLD AUTO: 0.42 X10(3) UL (ref 0.1–1)
MONOCYTES NFR BLD AUTO: 5.4 %
NEUTROPHILS # BLD AUTO: 6.46 X10 (3) UL (ref 1.5–7.7)
NEUTROPHILS # BLD AUTO: 6.46 X10(3) UL (ref 1.5–7.7)
NEUTROPHILS NFR BLD AUTO: 82.2 %
OSMOLALITY SERPL CALC.SUM OF ELEC: 290 MOSM/KG (ref 275–295)
PLATELET # BLD AUTO: 121 10(3)UL (ref 150–450)
POTASSIUM SERPL-SCNC: 3.8 MMOL/L (ref 3.5–5.1)
PROT SERPL-MCNC: 6.4 G/DL (ref 6.4–8.2)
RBC # BLD AUTO: 4.31 X10(6)UL
SODIUM SERPL-SCNC: 139 MMOL/L (ref 136–145)
WBC # BLD AUTO: 7.9 X10(3) UL (ref 4–11)

## 2022-05-20 PROCEDURE — 99232 SBSQ HOSP IP/OBS MODERATE 35: CPT | Performed by: INTERNAL MEDICINE

## 2022-05-20 PROCEDURE — 71260 CT THORAX DX C+: CPT | Performed by: INTERNAL MEDICINE

## 2022-05-20 PROCEDURE — 99223 1ST HOSP IP/OBS HIGH 75: CPT | Performed by: INTERNAL MEDICINE

## 2022-05-20 RX ORDER — SODIUM CHLORIDE 9 MG/ML
INJECTION, SOLUTION INTRAVENOUS CONTINUOUS
Status: DISCONTINUED | OUTPATIENT
Start: 2022-05-20 | End: 2022-05-20

## 2022-05-20 RX ORDER — SODIUM CHLORIDE, SODIUM LACTATE, POTASSIUM CHLORIDE, CALCIUM CHLORIDE 600; 310; 30; 20 MG/100ML; MG/100ML; MG/100ML; MG/100ML
INJECTION, SOLUTION INTRAVENOUS CONTINUOUS
Status: ACTIVE | OUTPATIENT
Start: 2022-05-20 | End: 2022-05-21

## 2022-05-20 NOTE — PLAN OF CARE
Awake, alert & oriented x4  C/o abdominal pain; relief with PRN dilaudid. This evening; c/o \"burning\" pain in abdomen, chest, and back that radiates to BUE. States this is new symptoms; Dr. Yadi Mccormick updated  Lipase elevated; Dr. Bita Samuel notified. NPO and IV fluids infusing  Heme/Onc notified of new consults  CT chest to be completed  Ambulating in halls with standby assist  Plan of care discussed with patient, spouse, and physicians.

## 2022-05-20 NOTE — CM/SW NOTE
[employer] Ins: HMO and Medicare Advantage - [phone] (TTY: 371)     Based on the tax id entered for [facility] pt is in network. SW listened to benefits/coverage from . Pt does not need a referral, pt should be covered at 100% for in  visits. APN notified.

## 2022-05-20 NOTE — PLAN OF CARE
Received patient from GI lab at shift change. Alert and oriented x4. Complains of pain with movement. Dilaudid given with tolerable relief. Zofran given for nausea. Patient then tolerated italian ice and jello. IVF as ordered. Patient wanting to delay evening meds at the moment as she wanted to rest. Spouse at bedside. Call light within reach, will continue to monitor.

## 2022-05-20 NOTE — ANESTHESIA POSTPROCEDURE EVALUATION
[address] Patient Status:  Inpatient   Age/Gender 65year old female MRN [id number]   Location [address] [address] Attending Sommer Rocha, [address] [facility] Day # 1 PCP Idalia Torres MD       Anesthesia Post-op Note    ENDOSCOPIC ULTRASOUND (EUS) with FNB, ENDOSCOPIC RETROGRADE CHOLANGIOPANCREATOGRAPHY (ERCP) with sphincterotomy, stent placement    Procedure Summary     Date: 05/19/22 Room / Location: [facility] ENDOSCOPY 01 / [facility] ENDOSCOPY    Anesthesia Start: 8007 Anesthesia Stop: 1847    Procedures:       ENDOSCOPIC ULTRASOUND (EUS) with FNB, ENDOSCOPIC RETROGRADE CHOLANGIOPANCREATOGRAPHY (ERCP) with sphincterotomy, stent placement (N/A )      ENDOSCOPIC RETROGRADE CHOLANGIOPANCREATOGRAPHY (ERCP) (N/A ) Diagnosis:       Pancreatic mass      LFT elevation      (pancreatic head mass)    Surgeons: Sussy Condon MD Anesthesiologist: Jessica Torres MD    Anesthesia Type: MAC ASA Status: 3          Anesthesia Type: MAC    Vitals Value Taken Time   /72 05/19/22 1915   Temp 98.0 05/19/22 1919   Pulse 80 05/19/22 1918   Resp 15 05/19/22 1918   SpO2 95 % 05/19/22 1918   Vitals shown include unvalidated device data. Patient Location: Endoscopy    Anesthesia Type: MAC    Airway Patency: patent    Postop Pain Control: adequate    Mental Status: mildly sedated but able to meaningfully participate in the post-anesthesia evaluation    Nausea/Vomiting: none    Cardiopulmonary/Hydration status: stable euvolemic    Complications: no apparent anesthesia related complications    Postop vital signs: stable    Dental Exam: Unchanged from Preop    Patient to be discharged from PACU when criteria met.

## 2022-05-21 LAB
ALBUMIN SERPL-MCNC: 2.5 G/DL (ref 3.4–5)
ALBUMIN/GLOB SERPL: 0.7 {RATIO} (ref 1–2)
ALP LIVER SERPL-CCNC: 417 U/L
ALT SERPL-CCNC: 306 U/L
ANION GAP SERPL CALC-SCNC: 8 MMOL/L (ref 0–18)
AST SERPL-CCNC: 239 U/L (ref 15–37)
BILIRUB SERPL-MCNC: 12.7 MG/DL (ref 0.1–2)
BUN BLD-MCNC: 12 MG/DL (ref 7–18)
CALCIUM BLD-MCNC: 8.6 MG/DL (ref 8.5–10.1)
CHLORIDE SERPL-SCNC: 101 MMOL/L (ref 98–112)
CO2 SERPL-SCNC: 30 MMOL/L (ref 21–32)
CREAT BLD-MCNC: 0.7 MG/DL
GLOBULIN PLAS-MCNC: 3.4 G/DL (ref 2.8–4.4)
GLUCOSE BLD-MCNC: 88 MG/DL (ref 70–99)
OSMOLALITY SERPL CALC.SUM OF ELEC: 287 MOSM/KG (ref 275–295)
PLATELET # BLD AUTO: 292 10(3)UL (ref 150–450)
POTASSIUM SERPL-SCNC: 3.2 MMOL/L (ref 3.5–5.1)
PROT SERPL-MCNC: 5.9 G/DL (ref 6.4–8.2)
SODIUM SERPL-SCNC: 139 MMOL/L (ref 136–145)

## 2022-05-21 PROCEDURE — 99232 SBSQ HOSP IP/OBS MODERATE 35: CPT | Performed by: INTERNAL MEDICINE

## 2022-05-21 RX ORDER — SODIUM CHLORIDE, SODIUM LACTATE, POTASSIUM CHLORIDE, CALCIUM CHLORIDE 600; 310; 30; 20 MG/100ML; MG/100ML; MG/100ML; MG/100ML
INJECTION, SOLUTION INTRAVENOUS CONTINUOUS
Status: DISCONTINUED | OUTPATIENT
Start: 2022-05-21 | End: 2022-05-26

## 2022-05-21 RX ORDER — SODIUM CHLORIDE, SODIUM LACTATE, POTASSIUM CHLORIDE, CALCIUM CHLORIDE 600; 310; 30; 20 MG/100ML; MG/100ML; MG/100ML; MG/100ML
INJECTION, SOLUTION INTRAVENOUS CONTINUOUS
Status: ACTIVE | OUTPATIENT
Start: 2022-05-21 | End: 2022-05-21

## 2022-05-21 NOTE — PLAN OF CARE
Assumed patient care 0730  Patient alert and oriented X4  On room air-2L at times, VSS, NSR on tele   Patient c/o 5-8/10 pain to mid abdomen, given scheduled and PRN pain medication with some relief   Patient c/o chest pressure/gas d/t inability to belch, given scheduled protonix   IVF infusing per order    Up with standby assist and walker to bathroom, and chair  Adequate urine output, no BM this shift, flatus(+), belching(-)  Ambulating in hallway with standby assist   Tolerating clear liquid diet  Await final pathology for plan of care  Patient and family updated on plan of care     Problem: PAIN - ADULT  Goal: Verbalizes/displays adequate comfort level or patient's stated pain goal  Description: INTERVENTIONS:  - Encourage pt to monitor pain and request assistance  - Assess pain using appropriate pain scale  - Administer analgesics based on type and severity of pain and evaluate response  - Implement non-pharmacological measures as appropriate and evaluate response  - Consider cultural and social influences on pain and pain management  - Manage/alleviate anxiety  - Utilize distraction and/or relaxation techniques  - Monitor for opioid side effects  - Notify MD/LIP if interventions unsuccessful or patient reports new pain  - Anticipate increased pain with activity and pre-medicate as appropriate  Outcome: Progressing     Problem: SAFETY ADULT - FALL  Goal: Free from fall injury  Description: INTERVENTIONS:  - Assess pt frequently for physical needs  - Identify cognitive and physical deficits and behaviors that affect risk of falls.   - [place] fall precautions as indicated by assessment.  - Educate pt/family on patient safety including physical limitations  - Instruct pt to call for assistance with activity based on assessment  - Modify environment to reduce risk of injury  - Provide assistive devices as appropriate  - Consider OT/PT consult to assist with strengthening/mobility  - Encourage toileting schedule  Outcome: Progressing     Problem: GASTROINTESTINAL - ADULT  Goal: Minimal or absence of nausea and vomiting  Description: INTERVENTIONS:  - Maintain adequate hydration with IV or PO as ordered and tolerated  - Nasogastric tube to low intermittent suction as ordered  - Evaluate effectiveness of ordered antiemetic medications  - Provide nonpharmacologic comfort measures as appropriate  - Advance diet as tolerated, if ordered  - Obtain nutritional consult as needed  - Evaluate fluid balance  Outcome: Progressing  Goal: Maintains or returns to baseline bowel function  Description: INTERVENTIONS:  - Assess bowel function  - Maintain adequate hydration with IV or PO as ordered and tolerated  - Evaluate effectiveness of GI medications  - Encourage mobilization and activity  - Obtain nutritional consult as needed  - Establish a toileting routine/schedule  - Consider collaborating with pharmacy to review patient's medication profile  Outcome: Progressing

## 2022-05-21 NOTE — PLAN OF CARE
Assumed pt care at [address] for the night shift. Pt returned to the room from CT. A/O x 4. Reports some nausea w/ mid upper abd discomfort. Zofran and Dilaudid IV given w/ adequate relief. NPO. IVF infusing as ordered. VSS. Afebrile. NSR on tele. 2L O2 applied overnight to keep O2 sats >90%. Family at the bedside requesting pt to be moved  to different room due to surrounding noise. Pt and all her belongings moved from [address] [place] [address] to [address] [place] [address]. All safety precautions maintained.  remains at the bedside overnight. Will cont to monitor. Problem: PAIN - ADULT  Goal: Verbalizes/displays adequate comfort level or patient's stated pain goal  Description: INTERVENTIONS:  - Encourage pt to monitor pain and request assistance  - Assess pain using appropriate pain scale  - Administer analgesics based on type and severity of pain and evaluate response  - Implement non-pharmacological measures as appropriate and evaluate response  - Consider cultural and social influences on pain and pain management  - Manage/alleviate anxiety  - Utilize distraction and/or relaxation techniques  - Monitor for opioid side effects  - Notify MD/LIP if interventions unsuccessful or patient reports new pain  - Anticipate increased pain with activity and pre-medicate as appropriate  Outcome: Progressing     Problem: SAFETY ADULT - FALL  Goal: Free from fall injury  Description: INTERVENTIONS:  - Assess pt frequently for physical needs  - Identify cognitive and physical deficits and behaviors that affect risk of falls.   - [place] fall precautions as indicated by assessment.  - Educate pt/family on patient safety including physical limitations  - Instruct pt to call for assistance with activity based on assessment  - Modify environment to reduce risk of injury  - Provide assistive devices as appropriate  - Consider OT/PT consult to assist with strengthening/mobility  - Encourage toileting schedule  Outcome: Progressing     Problem: GASTROINTESTINAL - ADULT  Goal: Minimal or absence of nausea and vomiting  Description: INTERVENTIONS:  - Maintain adequate hydration with IV or PO as ordered and tolerated  - Nasogastric tube to low intermittent suction as ordered  - Evaluate effectiveness of ordered antiemetic medications  - Provide nonpharmacologic comfort measures as appropriate  - Advance diet as tolerated, if ordered  - Obtain nutritional consult as needed  - Evaluate fluid balance  Outcome: Progressing  Goal: Maintains or returns to baseline bowel function  Description: INTERVENTIONS:  - Assess bowel function  - Maintain adequate hydration with IV or PO as ordered and tolerated  - Evaluate effectiveness of GI medications  - Encourage mobilization and activity  - Obtain nutritional consult as needed  - Establish a toileting routine/schedule  - Consider collaborating with pharmacy to review patient's medication profile  Outcome: Progressing

## 2022-05-22 ENCOUNTER — TELEPHONE (OUTPATIENT)
Dept: HEMATOLOGY/ONCOLOGY | Facility: HOSPITAL | Age: 62
End: 2022-05-22

## 2022-05-22 LAB
ALBUMIN SERPL-MCNC: 2.3 G/DL (ref 3.4–5)
ALBUMIN/GLOB SERPL: 0.6 {RATIO} (ref 1–2)
ALP LIVER SERPL-CCNC: 437 U/L
ALT SERPL-CCNC: 278 U/L
ANION GAP SERPL CALC-SCNC: 7 MMOL/L (ref 0–18)
AST SERPL-CCNC: 201 U/L (ref 15–37)
BILIRUB SERPL-MCNC: 15.7 MG/DL (ref 0.1–2)
BUN BLD-MCNC: 10 MG/DL (ref 7–18)
CALCIUM BLD-MCNC: 8.6 MG/DL (ref 8.5–10.1)
CHLORIDE SERPL-SCNC: 97 MMOL/L (ref 98–112)
CO2 SERPL-SCNC: 32 MMOL/L (ref 21–32)
CREAT BLD-MCNC: 0.65 MG/DL
GLOBULIN PLAS-MCNC: 3.6 G/DL (ref 2.8–4.4)
GLUCOSE BLD-MCNC: 111 MG/DL (ref 70–99)
LIPASE SERPL-CCNC: 311 U/L (ref 73–393)
OSMOLALITY SERPL CALC.SUM OF ELEC: 282 MOSM/KG (ref 275–295)
POTASSIUM SERPL-SCNC: 3.4 MMOL/L (ref 3.5–5.1)
POTASSIUM SERPL-SCNC: 3.4 MMOL/L (ref 3.5–5.1)
PROT SERPL-MCNC: 5.9 G/DL (ref 6.4–8.2)
SODIUM SERPL-SCNC: 136 MMOL/L (ref 136–145)

## 2022-05-22 PROCEDURE — 99232 SBSQ HOSP IP/OBS MODERATE 35: CPT | Performed by: INTERNAL MEDICINE

## 2022-05-22 RX ORDER — POTASSIUM CHLORIDE 20 MEQ/1
40 TABLET, EXTENDED RELEASE ORAL ONCE
Status: COMPLETED | OUTPATIENT
Start: 2022-05-22 | End: 2022-05-22

## 2022-05-22 NOTE — PLAN OF CARE
Pt stable overnight. Pain well controled with Dilaudid IV PRN. Zofran given x 1 this am for mild nausea. Afebrle. NSR on tele. Remains on 2L O2 via nc. Up brp w/ sba . Voids in good amounts. Denies a bm. Pt and the  updated on poc. Will cont to monitor. Problem: PAIN - ADULT  Goal: Verbalizes/displays adequate comfort level or patient's stated pain goal  Description: INTERVENTIONS:  - Encourage pt to monitor pain and request assistance  - Assess pain using appropriate pain scale  - Administer analgesics based on type and severity of pain and evaluate response  - Implement non-pharmacological measures as appropriate and evaluate response  - Consider cultural and social influences on pain and pain management  - Manage/alleviate anxiety  - Utilize distraction and/or relaxation techniques  - Monitor for opioid side effects  - Notify MD/LIP if interventions unsuccessful or patient reports new pain  - Anticipate increased pain with activity and pre-medicate as appropriate  Outcome: Progressing     Problem: SAFETY ADULT - FALL  Goal: Free from fall injury  Description: INTERVENTIONS:  - Assess pt frequently for physical needs  - Identify cognitive and physical deficits and behaviors that affect risk of falls.   - [place] fall precautions as indicated by assessment.  - Educate pt/family on patient safety including physical limitations  - Instruct pt to call for assistance with activity based on assessment  - Modify environment to reduce risk of injury  - Provide assistive devices as appropriate  - Consider OT/PT consult to assist with strengthening/mobility  - Encourage toileting schedule  Outcome: Progressing     Problem: GASTROINTESTINAL - ADULT  Goal: Minimal or absence of nausea and vomiting  Description: INTERVENTIONS:  - Maintain adequate hydration with IV or PO as ordered and tolerated  - Nasogastric tube to low intermittent suction as ordered  - Evaluate effectiveness of ordered antiemetic medications  - Provide nonpharmacologic comfort measures as appropriate  - Advance diet as tolerated, if ordered  - Obtain nutritional consult as needed  - Evaluate fluid balance  Outcome: Progressing  Goal: Maintains or returns to baseline bowel function  Description: INTERVENTIONS:  - Assess bowel function  - Maintain adequate hydration with IV or PO as ordered and tolerated  - Evaluate effectiveness of GI medications  - Encourage mobilization and activity  - Obtain nutritional consult as needed  - Establish a toileting routine/schedule  - Consider collaborating with pharmacy to review patient's medication profile  Outcome: Progressing     Problem: METABOLIC/FLUID AND ELECTROLYTES - ADULT  Goal: Glucose maintained within prescribed range  Description: INTERVENTIONS:  - Monitor Blood Glucose as ordered  - Assess for signs and symptoms of hyperglycemia and hypoglycemia  - Administer ordered medications to maintain glucose within target range  - Assess barriers to adequate nutritional intake and initiate nutrition consult as needed  - Instruct patient on self management of diabetes  Outcome: Progressing

## 2022-05-22 NOTE — TELEPHONE ENCOUNTER
Spoke to son Martina Shaw via telephone and discussed plan of care. Clinical picture concerning for metastatic pancreatic carcinoma but pathology is pending. Patient will be discharged once bilirubin starts going down. Discussed that disease is treatable but not curable. Actual chemotherapy regimen will depend on pathology. Unable to determine prognosis at this point. Discussed that chemotherapy is done outpatient. We do not keep patients in until pathology is back but in her case, her bili has been high. We will arrange for outpatient follow-up in clinic to go over plan of care after discharge.

## 2022-05-23 ENCOUNTER — APPOINTMENT (OUTPATIENT)
Dept: CT IMAGING | Facility: HOSPITAL | Age: 62
End: 2022-05-23
Attending: STUDENT IN AN ORGANIZED HEALTH CARE EDUCATION/TRAINING PROGRAM
Payer: COMMERCIAL

## 2022-05-23 LAB
ALBUMIN SERPL-MCNC: 2.1 G/DL (ref 3.4–5)
ALBUMIN/GLOB SERPL: 0.6 {RATIO} (ref 1–2)
ALP LIVER SERPL-CCNC: 442 U/L
ALT SERPL-CCNC: 236 U/L
ANION GAP SERPL CALC-SCNC: 6 MMOL/L (ref 0–18)
AST SERPL-CCNC: 167 U/L (ref 15–37)
BASOPHILS # BLD AUTO: 0.03 X10(3) UL (ref 0–0.2)
BASOPHILS NFR BLD AUTO: 0.3 %
BILIRUB DIRECT SERPL-MCNC: 12.8 MG/DL (ref 0–0.2)
BILIRUB SERPL-MCNC: 15 MG/DL (ref 0.1–2)
BUN BLD-MCNC: 9 MG/DL (ref 7–18)
CALCIUM BLD-MCNC: 8.3 MG/DL (ref 8.5–10.1)
CHLORIDE SERPL-SCNC: 100 MMOL/L (ref 98–112)
CO2 SERPL-SCNC: 32 MMOL/L (ref 21–32)
CREAT BLD-MCNC: 0.59 MG/DL
EOSINOPHIL # BLD AUTO: 0.17 X10(3) UL (ref 0–0.7)
EOSINOPHIL NFR BLD AUTO: 1.9 %
ERYTHROCYTE [DISTWIDTH] IN BLOOD BY AUTOMATED COUNT: 17.2 %
GLOBULIN PLAS-MCNC: 3.5 G/DL (ref 2.8–4.4)
GLUCOSE BLD-MCNC: 109 MG/DL (ref 70–99)
HCT VFR BLD AUTO: 32.6 %
HGB BLD-MCNC: 10.8 G/DL
IMM GRANULOCYTES # BLD AUTO: 0.06 X10(3) UL (ref 0–1)
IMM GRANULOCYTES NFR BLD: 0.7 %
LYMPHOCYTES # BLD AUTO: 0.88 X10(3) UL (ref 1–4)
LYMPHOCYTES NFR BLD AUTO: 9.7 %
MCH RBC QN AUTO: 28.5 PG (ref 26–34)
MCHC RBC AUTO-ENTMCNC: 33.1 G/DL (ref 31–37)
MCV RBC AUTO: 86 FL
MONOCYTES # BLD AUTO: 0.83 X10(3) UL (ref 0.1–1)
MONOCYTES NFR BLD AUTO: 9.1 %
NEUTROPHILS # BLD AUTO: 7.11 X10 (3) UL (ref 1.5–7.7)
NEUTROPHILS # BLD AUTO: 7.11 X10(3) UL (ref 1.5–7.7)
NEUTROPHILS NFR BLD AUTO: 78.3 %
OSMOLALITY SERPL CALC.SUM OF ELEC: 285 MOSM/KG (ref 275–295)
PLATELET # BLD AUTO: 276 10(3)UL (ref 150–450)
POTASSIUM SERPL-SCNC: 3.7 MMOL/L (ref 3.5–5.1)
POTASSIUM SERPL-SCNC: 3.7 MMOL/L (ref 3.5–5.1)
PROT SERPL-MCNC: 5.6 G/DL (ref 6.4–8.2)
RBC # BLD AUTO: 3.79 X10(6)UL
SODIUM SERPL-SCNC: 138 MMOL/L (ref 136–145)
WBC # BLD AUTO: 9.1 X10(3) UL (ref 4–11)

## 2022-05-23 PROCEDURE — 99232 SBSQ HOSP IP/OBS MODERATE 35: CPT | Performed by: INTERNAL MEDICINE

## 2022-05-23 PROCEDURE — 74160 CT ABDOMEN W/CONTRAST: CPT | Performed by: STUDENT IN AN ORGANIZED HEALTH CARE EDUCATION/TRAINING PROGRAM

## 2022-05-23 PROCEDURE — 99232 SBSQ HOSP IP/OBS MODERATE 35: CPT | Performed by: NURSE PRACTITIONER

## 2022-05-23 RX ORDER — HYDROMORPHONE HYDROCHLORIDE 1 MG/ML
0.2 INJECTION, SOLUTION INTRAMUSCULAR; INTRAVENOUS; SUBCUTANEOUS EVERY 2 HOUR PRN
Status: DISCONTINUED | OUTPATIENT
Start: 2022-05-23 | End: 2022-05-26

## 2022-05-23 RX ORDER — HYDROCODONE BITARTRATE AND ACETAMINOPHEN 5; 325 MG/1; MG/1
1 TABLET ORAL EVERY 4 HOURS PRN
Status: DISCONTINUED | OUTPATIENT
Start: 2022-05-23 | End: 2022-05-26

## 2022-05-23 RX ORDER — SODIUM PHOSPHATE, DIBASIC AND SODIUM PHOSPHATE, MONOBASIC 7; 19 G/133ML; G/133ML
1 ENEMA RECTAL ONCE AS NEEDED
Status: DISCONTINUED | OUTPATIENT
Start: 2022-05-23 | End: 2022-05-26

## 2022-05-23 RX ORDER — TRAMADOL HYDROCHLORIDE 50 MG/1
50 TABLET ORAL EVERY 6 HOURS PRN
Status: DISCONTINUED | OUTPATIENT
Start: 2022-05-23 | End: 2022-05-23

## 2022-05-23 RX ORDER — TRAMADOL HYDROCHLORIDE 50 MG/1
TABLET ORAL EVERY 8 HOURS PRN
Qty: 10 TABLET | Refills: 0 | Status: SHIPPED | OUTPATIENT
Start: 2022-05-23 | End: 2022-05-25

## 2022-05-23 RX ORDER — HYDROXYZINE HYDROCHLORIDE 25 MG/1
25 TABLET, FILM COATED ORAL 3 TIMES DAILY PRN
Qty: 30 TABLET | Refills: 0 | Status: SHIPPED | OUTPATIENT
Start: 2022-05-23 | End: 2022-05-26

## 2022-05-23 RX ORDER — DOCUSATE SODIUM 100 MG/1
100 CAPSULE, LIQUID FILLED ORAL 2 TIMES DAILY
Status: DISCONTINUED | OUTPATIENT
Start: 2022-05-23 | End: 2022-05-26

## 2022-05-23 RX ORDER — HYDROCODONE BITARTRATE AND ACETAMINOPHEN 5; 325 MG/1; MG/1
2 TABLET ORAL EVERY 4 HOURS PRN
Status: DISCONTINUED | OUTPATIENT
Start: 2022-05-23 | End: 2022-05-26

## 2022-05-23 RX ORDER — HYDROMORPHONE HYDROCHLORIDE 1 MG/ML
0.8 INJECTION, SOLUTION INTRAMUSCULAR; INTRAVENOUS; SUBCUTANEOUS EVERY 2 HOUR PRN
Status: DISCONTINUED | OUTPATIENT
Start: 2022-05-23 | End: 2022-05-26

## 2022-05-23 RX ORDER — BISACODYL 10 MG
10 SUPPOSITORY, RECTAL RECTAL
Status: DISCONTINUED | OUTPATIENT
Start: 2022-05-23 | End: 2022-05-26

## 2022-05-23 RX ORDER — HYDROMORPHONE HYDROCHLORIDE 1 MG/ML
0.4 INJECTION, SOLUTION INTRAMUSCULAR; INTRAVENOUS; SUBCUTANEOUS EVERY 2 HOUR PRN
Status: DISCONTINUED | OUTPATIENT
Start: 2022-05-23 | End: 2022-05-26

## 2022-05-23 RX ORDER — IOHEXOL 350 MG/ML
65 INJECTION, SOLUTION INTRAVENOUS
Status: COMPLETED | OUTPATIENT
Start: 2022-05-23 | End: 2022-05-23

## 2022-05-23 RX ORDER — POLYETHYLENE GLYCOL 3350 17 G/17G
17 POWDER, FOR SOLUTION ORAL DAILY PRN
Status: DISCONTINUED | OUTPATIENT
Start: 2022-05-23 | End: 2022-05-26

## 2022-05-23 NOTE — PLAN OF CARE
Assumed care of patient at 1900. Alert and oriented x4, ambulating in hallway with spouse. Pain relief with IV dilaudid. Zofran for nausea. Call light within reach, will continue to monitor and medicate for pain.

## 2022-05-23 NOTE — PLAN OF CARE
Awake, alert & oriented x4  C/o abdominal pain; relief with PRN norco  Zofran given x1 for nausea  Tolerating diet. IV fluids infusing per order  + gas, + belching. No BM. PRN miralax given  CT abdomen complete; Dr. Gloria Sullivan notified of critical result  Afebrile  IV Zosyn started  Family at bedside    Plan of care discussed with patient, family, and physicians.

## 2022-05-23 NOTE — PLAN OF CARE
Assumed patient care 0730  Patient alert and oriented X4  On room air-2L at times, VSS, NSR on tele   Patient c/o 3-5/10 pain to mid abdomen, given scheduled and PRN pain medication with relief   IVF infusing per order    Up with standby assist and walker to bathroom, and chair  Adequate urine output, no BM this shift, flatus(+), belching(+)  Ambulating in hallway with standby assist   Tolerating low fiber/soft diet   Await final pathology for plan of care  Patient and family updated on plan of care     Problem: PAIN - ADULT  Goal: Verbalizes/displays adequate comfort level or patient's stated pain goal  Description: INTERVENTIONS:  - Encourage pt to monitor pain and request assistance  - Assess pain using appropriate pain scale  - Administer analgesics based on type and severity of pain and evaluate response  - Implement non-pharmacological measures as appropriate and evaluate response  - Consider cultural and social influences on pain and pain management  - Manage/alleviate anxiety  - Utilize distraction and/or relaxation techniques  - Monitor for opioid side effects  - Notify MD/LIP if interventions unsuccessful or patient reports new pain  - Anticipate increased pain with activity and pre-medicate as appropriate  Outcome: Progressing     Problem: SAFETY ADULT - FALL  Goal: Free from fall injury  Description: INTERVENTIONS:  - Assess pt frequently for physical needs  - Identify cognitive and physical deficits and behaviors that affect risk of falls.   - [place] fall precautions as indicated by assessment.  - Educate pt/family on patient safety including physical limitations  - Instruct pt to call for assistance with activity based on assessment  - Modify environment to reduce risk of injury  - Provide assistive devices as appropriate  - Consider OT/PT consult to assist with strengthening/mobility  - Encourage toileting schedule  Outcome: Progressing     Problem: GASTROINTESTINAL - ADULT  Goal: Minimal or absence of nausea and vomiting  Description: INTERVENTIONS:  - Maintain adequate hydration with IV or PO as ordered and tolerated  - Nasogastric tube to low intermittent suction as ordered  - Evaluate effectiveness of ordered antiemetic medications  - Provide nonpharmacologic comfort measures as appropriate  - Advance diet as tolerated, if ordered  - Obtain nutritional consult as needed  - Evaluate fluid balance  Outcome: Progressing  Goal: Maintains or returns to baseline bowel function  Description: INTERVENTIONS:  - Assess bowel function  - Maintain adequate hydration with IV or PO as ordered and tolerated  - Evaluate effectiveness of GI medications  - Encourage mobilization and activity  - Obtain nutritional consult as needed  - Establish a toileting routine/schedule  - Consider collaborating with pharmacy to review patient's medication profile  Outcome: Progressing

## 2022-05-24 ENCOUNTER — ANESTHESIA (OUTPATIENT)
Dept: ENDOSCOPY | Facility: HOSPITAL | Age: 62
End: 2022-05-24
Payer: COMMERCIAL

## 2022-05-24 ENCOUNTER — APPOINTMENT (OUTPATIENT)
Dept: GENERAL RADIOLOGY | Facility: HOSPITAL | Age: 62
End: 2022-05-24
Attending: INTERNAL MEDICINE
Payer: COMMERCIAL

## 2022-05-24 ENCOUNTER — ANESTHESIA EVENT (OUTPATIENT)
Dept: ENDOSCOPY | Facility: HOSPITAL | Age: 62
End: 2022-05-24
Payer: COMMERCIAL

## 2022-05-24 ENCOUNTER — TELEPHONE (OUTPATIENT)
Dept: HEMATOLOGY/ONCOLOGY | Facility: HOSPITAL | Age: 62
End: 2022-05-24

## 2022-05-24 PROBLEM — C25.9 PANCREATIC CANCER METASTASIZED TO LIVER (HCC): Status: ACTIVE | Noted: 2022-05-24

## 2022-05-24 PROBLEM — C78.7 PANCREATIC CANCER METASTASIZED TO LIVER (HCC): Status: ACTIVE | Noted: 2022-05-24

## 2022-05-24 LAB
ALBUMIN SERPL-MCNC: 2 G/DL (ref 3.4–5)
ALBUMIN/GLOB SERPL: 0.6 {RATIO} (ref 1–2)
ALP LIVER SERPL-CCNC: 457 U/L
ALT SERPL-CCNC: 223 U/L
ANION GAP SERPL CALC-SCNC: 5 MMOL/L (ref 0–18)
AST SERPL-CCNC: 165 U/L (ref 15–37)
BASOPHILS # BLD AUTO: 0.04 X10(3) UL (ref 0–0.2)
BASOPHILS NFR BLD AUTO: 0.5 %
BILIRUB SERPL-MCNC: 15.3 MG/DL (ref 0.1–2)
BUN BLD-MCNC: 6 MG/DL (ref 7–18)
CALCIUM BLD-MCNC: 8.1 MG/DL (ref 8.5–10.1)
CHLORIDE SERPL-SCNC: 102 MMOL/L (ref 98–112)
CO2 SERPL-SCNC: 33 MMOL/L (ref 21–32)
CREAT BLD-MCNC: 0.64 MG/DL
DEPRECATED HBV CORE AB SER IA-ACNC: 759.2 NG/ML
EOSINOPHIL # BLD AUTO: 0.24 X10(3) UL (ref 0–0.7)
EOSINOPHIL NFR BLD AUTO: 3.1 %
ERYTHROCYTE [DISTWIDTH] IN BLOOD BY AUTOMATED COUNT: 18 %
FOLATE SERPL-MCNC: 16.3 NG/ML (ref 8.7–?)
GLOBULIN PLAS-MCNC: 3.4 G/DL (ref 2.8–4.4)
GLUCOSE BLD-MCNC: 111 MG/DL (ref 70–99)
HCT VFR BLD AUTO: 33 %
HGB BLD-MCNC: 11 G/DL
HGB RETIC QN AUTO: 36 PG (ref 28.2–36.6)
IMM GRANULOCYTES # BLD AUTO: 0.09 X10(3) UL (ref 0–1)
IMM GRANULOCYTES NFR BLD: 1.2 %
IMM RETICS NFR: 0.13 RATIO (ref 0.1–0.3)
IRON SATN MFR SERPL: 24 %
IRON SERPL-MCNC: 56 UG/DL
LYMPHOCYTES # BLD AUTO: 0.92 X10(3) UL (ref 1–4)
LYMPHOCYTES NFR BLD AUTO: 11.8 %
MCH RBC QN AUTO: 28.9 PG (ref 26–34)
MCHC RBC AUTO-ENTMCNC: 33.3 G/DL (ref 31–37)
MCV RBC AUTO: 86.6 FL
MONOCYTES # BLD AUTO: 0.69 X10(3) UL (ref 0.1–1)
MONOCYTES NFR BLD AUTO: 8.9 %
NEUTROPHILS # BLD AUTO: 5.81 X10 (3) UL (ref 1.5–7.7)
NEUTROPHILS # BLD AUTO: 5.81 X10(3) UL (ref 1.5–7.7)
NEUTROPHILS NFR BLD AUTO: 74.5 %
OSMOLALITY SERPL CALC.SUM OF ELEC: 288 MOSM/KG (ref 275–295)
PLATELET # BLD AUTO: 319 10(3)UL (ref 150–450)
POTASSIUM SERPL-SCNC: 3.6 MMOL/L (ref 3.5–5.1)
PROT SERPL-MCNC: 5.4 G/DL (ref 6.4–8.2)
RBC # BLD AUTO: 3.81 X10(6)UL
RETICS # AUTO: 85.7 X10(3) UL (ref 22.5–147.5)
RETICS/RBC NFR AUTO: 2.3 %
SARS-COV-2 RNA RESP QL NAA+PROBE: NOT DETECTED
SODIUM SERPL-SCNC: 140 MMOL/L (ref 136–145)
TIBC SERPL-MCNC: 235 UG/DL (ref 240–450)
TRANSFERRIN SERPL-MCNC: 158 MG/DL (ref 200–360)
VIT B12 SERPL-MCNC: >2000 PG/ML (ref 193–986)
WBC # BLD AUTO: 7.8 X10(3) UL (ref 4–11)

## 2022-05-24 PROCEDURE — 99233 SBSQ HOSP IP/OBS HIGH 50: CPT | Performed by: INTERNAL MEDICINE

## 2022-05-24 PROCEDURE — 0F798DZ DILATION OF COMMON BILE DUCT WITH INTRALUMINAL DEVICE, VIA NATURAL OR ARTIFICIAL OPENING ENDOSCOPIC: ICD-10-PCS | Performed by: INTERNAL MEDICINE

## 2022-05-24 PROCEDURE — 74328 X-RAY BILE DUCT ENDOSCOPY: CPT | Performed by: INTERNAL MEDICINE

## 2022-05-24 PROCEDURE — 99232 SBSQ HOSP IP/OBS MODERATE 35: CPT | Performed by: INTERNAL MEDICINE

## 2022-05-24 DEVICE — STENT SYSTEM RMV
Type: IMPLANTABLE DEVICE | Site: BILIARY | Status: FUNCTIONAL
Brand: WALLFLEX BILIARY

## 2022-05-24 RX ORDER — NALOXONE HYDROCHLORIDE 0.4 MG/ML
80 INJECTION, SOLUTION INTRAMUSCULAR; INTRAVENOUS; SUBCUTANEOUS AS NEEDED
Status: ACTIVE | OUTPATIENT
Start: 2022-05-24 | End: 2022-05-25

## 2022-05-24 RX ORDER — LIDOCAINE HYDROCHLORIDE 10 MG/ML
INJECTION, SOLUTION EPIDURAL; INFILTRATION; INTRACAUDAL; PERINEURAL AS NEEDED
Status: DISCONTINUED | OUTPATIENT
Start: 2022-05-24 | End: 2022-05-24 | Stop reason: SURG

## 2022-05-24 RX ORDER — SODIUM CHLORIDE, SODIUM LACTATE, POTASSIUM CHLORIDE, CALCIUM CHLORIDE 600; 310; 30; 20 MG/100ML; MG/100ML; MG/100ML; MG/100ML
INJECTION, SOLUTION INTRAVENOUS CONTINUOUS
Status: DISCONTINUED | OUTPATIENT
Start: 2022-05-24 | End: 2022-05-25

## 2022-05-24 RX ORDER — HYDROMORPHONE HYDROCHLORIDE 1 MG/ML
0.6 INJECTION, SOLUTION INTRAMUSCULAR; INTRAVENOUS; SUBCUTANEOUS EVERY 5 MIN PRN
Status: ACTIVE | OUTPATIENT
Start: 2022-05-24 | End: 2022-05-25

## 2022-05-24 RX ORDER — HYDROMORPHONE HYDROCHLORIDE 1 MG/ML
0.4 INJECTION, SOLUTION INTRAMUSCULAR; INTRAVENOUS; SUBCUTANEOUS EVERY 5 MIN PRN
Status: ACTIVE | OUTPATIENT
Start: 2022-05-24 | End: 2022-05-25

## 2022-05-24 RX ORDER — HYDROMORPHONE HYDROCHLORIDE 1 MG/ML
0.2 INJECTION, SOLUTION INTRAMUSCULAR; INTRAVENOUS; SUBCUTANEOUS EVERY 5 MIN PRN
Status: ACTIVE | OUTPATIENT
Start: 2022-05-24 | End: 2022-05-25

## 2022-05-24 RX ADMIN — LIDOCAINE HYDROCHLORIDE 25 MG: 10 INJECTION, SOLUTION EPIDURAL; INFILTRATION; INTRACAUDAL; PERINEURAL at 16:11:00

## 2022-05-24 RX ADMIN — SODIUM CHLORIDE, SODIUM LACTATE, POTASSIUM CHLORIDE, CALCIUM CHLORIDE: 600; 310; 30; 20 INJECTION, SOLUTION INTRAVENOUS at 16:09:00

## 2022-05-24 RX ADMIN — SODIUM CHLORIDE, SODIUM LACTATE, POTASSIUM CHLORIDE, CALCIUM CHLORIDE: 600; 310; 30; 20 INJECTION, SOLUTION INTRAVENOUS at 16:25:00

## 2022-05-24 NOTE — PLAN OF CARE
Received pt at 1930  Pt AOx4, NSR, 2L NC, VSS  Primarily Sudanese speaking. Family at bedside to translate  PRN [place] [facility]  IV Zosyn  IVF  Colace added  NPO @ [place] for ERCP  Call light within reach. Needs currently met       Problem: PAIN - ADULT  Goal: Verbalizes/displays adequate comfort level or patient's stated pain goal  Description: INTERVENTIONS:  - Encourage pt to monitor pain and request assistance  - Assess pain using appropriate pain scale  - Administer analgesics based on type and severity of pain and evaluate response  - Implement non-pharmacological measures as appropriate and evaluate response  - Consider cultural and social influences on pain and pain management  - Manage/alleviate anxiety  - Utilize distraction and/or relaxation techniques  - Monitor for opioid side effects  - Notify MD/LIP if interventions unsuccessful or patient reports new pain  - Anticipate increased pain with activity and pre-medicate as appropriate  Outcome: Progressing     Problem: SAFETY ADULT - FALL  Goal: Free from fall injury  Description: INTERVENTIONS:  - Assess pt frequently for physical needs  - Identify cognitive and physical deficits and behaviors that affect risk of falls.   - [place] fall precautions as indicated by assessment.  - Educate pt/family on patient safety including physical limitations  - Instruct pt to call for assistance with activity based on assessment  - Modify environment to reduce risk of injury  - Provide assistive devices as appropriate  - Consider OT/PT consult to assist with strengthening/mobility  - Encourage toileting schedule  Outcome: Progressing     Problem: GASTROINTESTINAL - ADULT  Goal: Minimal or absence of nausea and vomiting  Description: INTERVENTIONS:  - Maintain adequate hydration with IV or PO as ordered and tolerated  - Nasogastric tube to low intermittent suction as ordered  - Evaluate effectiveness of ordered antiemetic medications  - Provide nonpharmacologic comfort measures as appropriate  - Advance diet as tolerated, if ordered  - Obtain nutritional consult as needed  - Evaluate fluid balance  Outcome: Progressing  Goal: Maintains or returns to baseline bowel function  Description: INTERVENTIONS:  - Assess bowel function  - Maintain adequate hydration with IV or PO as ordered and tolerated  - Evaluate effectiveness of GI medications  - Encourage mobilization and activity  - Obtain nutritional consult as needed  - Establish a toileting routine/schedule  - Consider collaborating with pharmacy to review patient's medication profile  Outcome: Progressing     Problem: METABOLIC/FLUID AND ELECTROLYTES - ADULT  Goal: Glucose maintained within prescribed range  Description: INTERVENTIONS:  - Monitor Blood Glucose as ordered  - Assess for signs and symptoms of hyperglycemia and hypoglycemia  - Administer ordered medications to maintain glucose within target range  - Assess barriers to adequate nutritional intake and initiate nutrition consult as needed  - Instruct patient on self management of diabetes  Outcome: Progressing  Goal: Electrolytes maintained within normal limits  Description: INTERVENTIONS:  - Monitor labs and rhythm and assess patient for signs and symptoms of electrolyte imbalances  - Administer electrolyte replacement as ordered  - Monitor response to electrolyte replacements, including rhythm and repeat lab results as appropriate  - Fluid restriction as ordered  - Instruct patient on fluid and nutrition restrictions as appropriate  Outcome: Progressing     Problem: HEMATOLOGIC - ADULT  Goal: Maintains hematologic stability  Description: INTERVENTIONS  - Assess for signs and symptoms of bleeding or hemorrhage  - Monitor labs and vital signs for trends  - Administer supportive blood products/factors, fluids and medications as ordered and appropriate  - Administer supportive blood products/factors as ordered and appropriate  Outcome: Progressing

## 2022-05-24 NOTE — CM/SW NOTE
Pt discussed in rounds this AM. Plans for ERCP today. Anticipating HH needs at dc. SW sent referral, included RN and PT calvin.     SW to remain available for dc planning.  SW to follow up on [facility] referral.    HUONG Nova

## 2022-05-24 NOTE — TELEPHONE ENCOUNTER
Boris called to speak to Dr. Eveline De Leon. He said she was helping him get the patient transferred to another hospital.  Please call when able. Thank you.

## 2022-05-24 NOTE — TELEPHONE ENCOUNTER
Returned the call. Let him know Dr Bairon Burch would be rounding around 315-3:30 today. Appreciated the call.

## 2022-05-24 NOTE — PLAN OF CARE
Assumed care at 0730. A&Ox4, NSR, 2L NC, VSS. Jaundice upon skin assessment. Primarily Hungarian speaking, family at bedside to translate. C/O abdominal pain, treated with [place]. ERCP completed with stent placement. Diet resumed as tolerated. Patient resting comfortably, will continue to monitor.

## 2022-05-25 LAB
ALBUMIN SERPL-MCNC: 1.9 G/DL (ref 3.4–5)
ALBUMIN/GLOB SERPL: 0.5 {RATIO} (ref 1–2)
ALP LIVER SERPL-CCNC: 478 U/L
ALT SERPL-CCNC: 206 U/L
ANION GAP SERPL CALC-SCNC: 6 MMOL/L (ref 0–18)
AST SERPL-CCNC: 146 U/L (ref 15–37)
BASOPHILS # BLD AUTO: 0.05 X10(3) UL (ref 0–0.2)
BASOPHILS NFR BLD AUTO: 0.8 %
BILIRUB SERPL-MCNC: 10.3 MG/DL (ref 0.1–2)
BUN BLD-MCNC: 5 MG/DL (ref 7–18)
CALCIUM BLD-MCNC: 8.5 MG/DL (ref 8.5–10.1)
CHLORIDE SERPL-SCNC: 103 MMOL/L (ref 98–112)
CO2 SERPL-SCNC: 31 MMOL/L (ref 21–32)
CREAT BLD-MCNC: 0.53 MG/DL
EOSINOPHIL # BLD AUTO: 0.27 X10(3) UL (ref 0–0.7)
EOSINOPHIL NFR BLD AUTO: 4.2 %
ERYTHROCYTE [DISTWIDTH] IN BLOOD BY AUTOMATED COUNT: 17.9 %
GLOBULIN PLAS-MCNC: 3.6 G/DL (ref 2.8–4.4)
GLUCOSE BLD-MCNC: 109 MG/DL (ref 70–99)
HCT VFR BLD AUTO: 31 %
HGB BLD-MCNC: 10.5 G/DL
IMM GRANULOCYTES # BLD AUTO: 0.05 X10(3) UL (ref 0–1)
IMM GRANULOCYTES NFR BLD: 0.8 %
LIPASE SERPL-CCNC: 261 U/L (ref 73–393)
LIPASE SERPL-CCNC: 315 U/L (ref 73–393)
LYMPHOCYTES # BLD AUTO: 1.14 X10(3) UL (ref 1–4)
LYMPHOCYTES NFR BLD AUTO: 17.9 %
MCH RBC QN AUTO: 28.8 PG (ref 26–34)
MCHC RBC AUTO-ENTMCNC: 33.9 G/DL (ref 31–37)
MCV RBC AUTO: 84.9 FL
MONOCYTES # BLD AUTO: 0.6 X10(3) UL (ref 0.1–1)
MONOCYTES NFR BLD AUTO: 9.4 %
NEUTROPHILS # BLD AUTO: 4.27 X10 (3) UL (ref 1.5–7.7)
NEUTROPHILS # BLD AUTO: 4.27 X10(3) UL (ref 1.5–7.7)
NEUTROPHILS NFR BLD AUTO: 66.9 %
OSMOLALITY SERPL CALC.SUM OF ELEC: 288 MOSM/KG (ref 275–295)
PLATELET # BLD AUTO: 298 10(3)UL (ref 150–450)
POTASSIUM SERPL-SCNC: 3.6 MMOL/L (ref 3.5–5.1)
PROT SERPL-MCNC: 5.5 G/DL (ref 6.4–8.2)
RBC # BLD AUTO: 3.65 X10(6)UL
SODIUM SERPL-SCNC: 140 MMOL/L (ref 136–145)
WBC # BLD AUTO: 6.4 X10(3) UL (ref 4–11)

## 2022-05-25 PROCEDURE — 99291 CRITICAL CARE FIRST HOUR: CPT | Performed by: HOSPITALIST

## 2022-05-25 PROCEDURE — 99231 SBSQ HOSP IP/OBS SF/LOW 25: CPT | Performed by: NURSE PRACTITIONER

## 2022-05-25 RX ORDER — HYDROCODONE BITARTRATE AND ACETAMINOPHEN 5; 325 MG/1; MG/1
1-2 TABLET ORAL EVERY 4 HOURS PRN
Qty: 30 TABLET | Refills: 0 | Status: SHIPPED | OUTPATIENT
Start: 2022-05-25

## 2022-05-25 NOTE — PLAN OF CARE
Assumed care at [address]. Alert and oriented x4. Telemetry monitor reading SR. IVF infusing assessed and maintained. Pain controlled with Norco. No acute events overnight. Fall precautions maintained per protocol. Call light in reach at bedside. Will continue to monitor.        Problem: Patient/Family Goals  Goal: Patient/Family Long Term Goal  Description: Patient's Long Term Goal:     Interventions:  -   - See additional Care Plan goals for specific interventions  Outcome: Progressing  Goal: Patient/Family Short Term Goal  Description: Patient's Short Term Goal:     Interventions:   -   - See additional Care Plan goals for specific interventions  Outcome: Progressing

## 2022-05-26 VITALS
HEIGHT: 60 IN | BODY MASS INDEX: 22.58 KG/M2 | SYSTOLIC BLOOD PRESSURE: 116 MMHG | TEMPERATURE: 98 F | WEIGHT: 115 LBS | HEART RATE: 58 BPM | RESPIRATION RATE: 18 BRPM | DIASTOLIC BLOOD PRESSURE: 73 MMHG | OXYGEN SATURATION: 94 %

## 2022-05-26 LAB
ALBUMIN SERPL-MCNC: 2.1 G/DL (ref 3.4–5)
ALBUMIN/GLOB SERPL: 0.5 {RATIO} (ref 1–2)
ALP LIVER SERPL-CCNC: 494 U/L
ALT SERPL-CCNC: 183 U/L
ANION GAP SERPL CALC-SCNC: 6 MMOL/L (ref 0–18)
AST SERPL-CCNC: 100 U/L (ref 15–37)
BILIRUB SERPL-MCNC: 7.9 MG/DL (ref 0.1–2)
BUN BLD-MCNC: 6 MG/DL (ref 7–18)
CALCIUM BLD-MCNC: 8.8 MG/DL (ref 8.5–10.1)
CHLORIDE SERPL-SCNC: 102 MMOL/L (ref 98–112)
CO2 SERPL-SCNC: 31 MMOL/L (ref 21–32)
CREAT BLD-MCNC: 0.58 MG/DL
GLOBULIN PLAS-MCNC: 4 G/DL (ref 2.8–4.4)
GLUCOSE BLD-MCNC: 115 MG/DL (ref 70–99)
MAGNESIUM SERPL-MCNC: 2.1 MG/DL (ref 1.6–2.6)
OSMOLALITY SERPL CALC.SUM OF ELEC: 287 MOSM/KG (ref 275–295)
POTASSIUM SERPL-SCNC: 3.4 MMOL/L (ref 3.5–5.1)
PROT SERPL-MCNC: 6.1 G/DL (ref 6.4–8.2)
SODIUM SERPL-SCNC: 139 MMOL/L (ref 136–145)

## 2022-05-26 PROCEDURE — 99233 SBSQ HOSP IP/OBS HIGH 50: CPT | Performed by: INTERNAL MEDICINE

## 2022-05-26 PROCEDURE — 99239 HOSP IP/OBS DSCHRG MGMT >30: CPT | Performed by: HOSPITALIST

## 2022-05-26 RX ORDER — POTASSIUM CHLORIDE 1.5 G/1.77G
40 POWDER, FOR SOLUTION ORAL ONCE
Status: DISCONTINUED | OUTPATIENT
Start: 2022-05-26 | End: 2022-05-26

## 2022-05-26 RX ORDER — POTASSIUM CHLORIDE 20 MEQ/1
40 TABLET, EXTENDED RELEASE ORAL ONCE
Status: COMPLETED | OUTPATIENT
Start: 2022-05-26 | End: 2022-05-26

## 2022-05-26 RX ORDER — ACETAMINOPHEN 325 MG/1
650 TABLET ORAL EVERY 6 HOURS PRN
Status: DISCONTINUED | OUTPATIENT
Start: 2022-05-26 | End: 2022-05-26

## 2022-05-26 RX ORDER — HYDROCODONE BITARTRATE AND ACETAMINOPHEN 5; 325 MG/1; MG/1
1 TABLET ORAL EVERY 4 HOURS PRN
Status: DISCONTINUED | OUTPATIENT
Start: 2022-05-26 | End: 2022-05-26

## 2022-05-26 NOTE — PLAN OF CARE
NURSING DISCHARGE NOTE    Discharged Home via Wheelchair. Accompanied by Family member and Support staff  Belongings Taken by patient/family. Discharge AVS complete and reviewed with patient and spouse. Patient medically cleared to discharge per all consults. Discharge orders and instructions given. New medications and importance of follow up appointments discussed. Instructed on when to call doctor and signs and symptoms when to return to hospital.  All questions answered to patient's satisfaction. New medications delivered at bedside; medications in hand at time of discharge. Patient discharged home with spouse.

## 2022-05-26 NOTE — PLAN OF CARE
Assumed care at 1930  A&O x4, 2L O2, VSS  Upper abd pain managed with norco, rating it 3/10  IV fluids infusing  Will continue to monitor

## 2022-05-26 NOTE — PLAN OF CARE
Pt is alert x 4. Sleepy most of day. IV fluids increased to 125 ml/hr. Pt had episode of epigastric pain that was localized to lower sternal area. Dilaudid 0.2ml given with good results.  spoke with heme/onc 2 times today. Multiple family members at bedside.  will stay overnight.

## 2022-05-27 NOTE — PAYOR COMM NOTE
Discharge Notification    Patient Name: Gretchen Dixon: NINA Braxton [facility] #: [id number]  Authorization Number: [id number]  Admit Date/Time: 5/18/2022 12:42 AM  Discharge Date/Time: 5/26/2022 4:58 PM

## 2022-06-23 ENCOUNTER — TELEPHONE (OUTPATIENT)
Dept: UROLOGY | Facility: CLINIC | Age: 62
End: 2022-06-23

## 2022-06-23 ENCOUNTER — OFFICE VISIT (OUTPATIENT)
Dept: UROLOGY | Facility: CLINIC | Age: 62
End: 2022-06-23
Attending: STUDENT IN AN ORGANIZED HEALTH CARE EDUCATION/TRAINING PROGRAM
Payer: COMMERCIAL

## 2022-06-23 VITALS — HEIGHT: 60 IN | TEMPERATURE: 99 F | WEIGHT: 115 LBS | BODY MASS INDEX: 22.58 KG/M2

## 2022-06-23 DIAGNOSIS — N95.2 POSTMENOPAUSAL ATROPHIC VAGINITIS: ICD-10-CM

## 2022-06-23 DIAGNOSIS — N81.84 PELVIC MUSCLE WASTING: Primary | ICD-10-CM

## 2022-06-23 DIAGNOSIS — Z98.890 POSTOPERATIVE STATE: ICD-10-CM

## 2022-06-23 DIAGNOSIS — R30.0 DYSURIA: ICD-10-CM

## 2022-06-23 PROCEDURE — 87086 URINE CULTURE/COLONY COUNT: CPT | Performed by: STUDENT IN AN ORGANIZED HEALTH CARE EDUCATION/TRAINING PROGRAM

## 2022-06-23 PROCEDURE — 51701 INSERT BLADDER CATHETER: CPT | Performed by: STUDENT IN AN ORGANIZED HEALTH CARE EDUCATION/TRAINING PROGRAM

## 2022-06-23 PROCEDURE — 99212 OFFICE O/P EST SF 10 MIN: CPT

## 2022-06-23 RX ORDER — PANCRELIPASE LIPASE, PANCRELIPASE AMYLASE, AND PANCRELIPASE PROTEASE 149900; 37000; 97300 [USP'U]/1; [USP'U]/1; [USP'U]/1
2 CAPSULE, DELAYED RELEASE ORAL
COMMUNITY
Start: 2022-06-07

## 2022-06-23 RX ORDER — ESTRADIOL 0.1 MG/G
CREAM VAGINAL
Qty: 42.5 G | Refills: 3 | Status: SHIPPED | OUTPATIENT
Start: 2022-06-23

## 2022-06-23 NOTE — PROCEDURES
Offered , pt declines, she prefers her spouse, Liane Push translate for her. Pt voided 40 ml in privacy of bathroom, concentrated yellow urine. Straight cath per [place], PVR=10ml, collected and sent for culture.

## 2022-06-23 NOTE — PROGRESS NOTES
Pt seen and examined, see PA documentation for exam details  Pt seen today after c/o lower abd pressure  Pelvic reconstructive surgery as described above 3/14/22  Post op complaints managed, see various encounters  Pt advised to proceed to ER when complaints persisted for emergent eval and diag w/ panc ca  Pt tearful and frustrated. Feels we should have known about her panc ca prior to surgery. Pre op clearance per Dr Fabian Ireland pt's questions    Void assessment wnl today  Exam wnl today  ucx pending, prefers to wait for final result for tx given chemo & other meds  May resume vag estrogen twice weekly  Plan for follow up as scheduled for 3 mo post op     She and her  understand and agree to plan.     Visit conducted in [address] and with Becky Rasheed,  as   Abinesole Craig DO

## 2022-06-23 NOTE — TELEPHONE ENCOUNTER
Late Entry:  Pt called  with request to speak with Dr. Azalea Helms, she is experiencing pelvic pain and possible bulge. Pt prefers to speak with Emma Irving ([address] speaking) at , declined RN. Dr. Azalea Helms notified of patient complaints. Dr. Lauren Milton requests appointment for patient evaluation. Pt scheduled with DANIEL Rosario  6-23-22.

## 2022-06-25 ENCOUNTER — TELEPHONE (OUTPATIENT)
Dept: UROLOGY | Facility: CLINIC | Age: 62
End: 2022-06-25

## 2022-06-25 DIAGNOSIS — M54.12 LEFT CERVICAL RADICULOPATHY: Primary | ICD-10-CM

## 2022-06-25 NOTE — TELEPHONE ENCOUNTER
TC to pt's   ucx neg  No urine infection  Reassured him given her recently normal pelvic exam  Cont chemo  Follow sx  F/u as scheduled

## 2022-06-28 RX ORDER — GABAPENTIN 300 MG/1
300 CAPSULE ORAL NIGHTLY
Qty: 90 CAPSULE | Refills: 1 | Status: SHIPPED | OUTPATIENT
Start: 2022-06-28

## 2022-07-05 ENCOUNTER — OFFICE VISIT (OUTPATIENT)
Dept: UROLOGY | Facility: CLINIC | Age: 62
End: 2022-07-05
Attending: OBSTETRICS & GYNECOLOGY
Payer: COMMERCIAL

## 2022-07-05 VITALS — HEIGHT: 60 IN | WEIGHT: 115 LBS | BODY MASS INDEX: 22.58 KG/M2

## 2022-07-05 DIAGNOSIS — N94.10 DYSPAREUNIA, FEMALE: Primary | ICD-10-CM

## 2022-07-05 DIAGNOSIS — Z98.890 POSTOPERATIVE STATE: ICD-10-CM

## 2022-07-05 DIAGNOSIS — N95.2 POSTMENOPAUSAL ATROPHIC VAGINITIS: ICD-10-CM

## 2022-07-05 DIAGNOSIS — N81.84 PELVIC MUSCLE WASTING: ICD-10-CM

## 2022-07-05 PROCEDURE — 99212 OFFICE O/P EST SF 10 MIN: CPT

## 2022-08-02 ENCOUNTER — HOSPITAL ENCOUNTER (EMERGENCY)
Facility: HOSPITAL | Age: 62
Discharge: HOME | End: 2022-08-03
Attending: EMERGENCY MEDICINE
Payer: COMMERCIAL

## 2022-08-02 DIAGNOSIS — C25.9 MALIGNANT NEOPLASM OF PANCREAS, UNSPECIFIED LOCATION OF MALIGNANCY (HCC): Primary | ICD-10-CM

## 2022-08-02 DIAGNOSIS — R10.13 EPIGASTRIC PAIN: ICD-10-CM

## 2022-08-02 LAB
ALBUMIN SERPL-MCNC: 3.2 G/DL (ref 3.4–5)
ALBUMIN/GLOB SERPL: 0.8 {RATIO} (ref 1–2)
ALP LIVER SERPL-CCNC: 351 U/L
ALT SERPL-CCNC: 119 U/L
ANION GAP SERPL CALC-SCNC: 2 MMOL/L (ref 0–18)
AST SERPL-CCNC: 145 U/L (ref 15–37)
BASOPHILS # BLD AUTO: 0.03 X10(3) UL (ref 0–0.2)
BASOPHILS NFR BLD AUTO: 0.5 %
BILIRUB SERPL-MCNC: 0.3 MG/DL (ref 0.1–2)
BUN BLD-MCNC: 24 MG/DL (ref 7–18)
CALCIUM BLD-MCNC: 9 MG/DL (ref 8.5–10.1)
CHLORIDE SERPL-SCNC: 102 MMOL/L (ref 98–112)
CO2 SERPL-SCNC: 34 MMOL/L (ref 21–32)
CREAT BLD-MCNC: 0.77 MG/DL
EOSINOPHIL # BLD AUTO: 0.15 X10(3) UL (ref 0–0.7)
EOSINOPHIL NFR BLD AUTO: 2.7 %
ERYTHROCYTE [DISTWIDTH] IN BLOOD BY AUTOMATED COUNT: 15.2 %
GFR SERPLBLD BASED ON 1.73 SQ M-ARVRAT: 88 ML/MIN/1.73M2 (ref 60–?)
GLOBULIN PLAS-MCNC: 4 G/DL (ref 2.8–4.4)
GLUCOSE BLD-MCNC: 144 MG/DL (ref 70–99)
HCT VFR BLD AUTO: 38.7 %
HGB BLD-MCNC: 12.6 G/DL
IMM GRANULOCYTES # BLD AUTO: 0.01 X10(3) UL (ref 0–1)
IMM GRANULOCYTES NFR BLD: 0.2 %
LIPASE SERPL-CCNC: 311 U/L (ref 73–393)
LYMPHOCYTES # BLD AUTO: 1.48 X10(3) UL (ref 1–4)
LYMPHOCYTES NFR BLD AUTO: 26.4 %
MCH RBC QN AUTO: 30.4 PG (ref 26–34)
MCHC RBC AUTO-ENTMCNC: 32.6 G/DL (ref 31–37)
MCV RBC AUTO: 93.5 FL
MONOCYTES # BLD AUTO: 0.08 X10(3) UL (ref 0.1–1)
MONOCYTES NFR BLD AUTO: 1.4 %
NEUTROPHILS # BLD AUTO: 3.86 X10 (3) UL (ref 1.5–7.7)
NEUTROPHILS # BLD AUTO: 3.86 X10(3) UL (ref 1.5–7.7)
NEUTROPHILS NFR BLD AUTO: 68.8 %
OSMOLALITY SERPL CALC.SUM OF ELEC: 293 MOSM/KG (ref 275–295)
PLATELET # BLD AUTO: 323 10(3)UL (ref 150–450)
POTASSIUM SERPL-SCNC: 3.5 MMOL/L (ref 3.5–5.1)
PROT SERPL-MCNC: 7.2 G/DL (ref 6.4–8.2)
RBC # BLD AUTO: 4.14 X10(6)UL
SODIUM SERPL-SCNC: 138 MMOL/L (ref 136–145)
TROPONIN I HIGH SENSITIVITY: 4 NG/L
WBC # BLD AUTO: 5.6 X10(3) UL (ref 4–11)

## 2022-08-02 PROCEDURE — 99284 EMERGENCY DEPT VISIT MOD MDM: CPT

## 2022-08-02 PROCEDURE — 85025 COMPLETE CBC W/AUTO DIFF WBC: CPT | Performed by: EMERGENCY MEDICINE

## 2022-08-02 PROCEDURE — 83690 ASSAY OF LIPASE: CPT | Performed by: EMERGENCY MEDICINE

## 2022-08-02 PROCEDURE — 84484 ASSAY OF TROPONIN QUANT: CPT | Performed by: EMERGENCY MEDICINE

## 2022-08-02 PROCEDURE — 80053 COMPREHEN METABOLIC PANEL: CPT | Performed by: EMERGENCY MEDICINE

## 2022-08-02 PROCEDURE — 96360 HYDRATION IV INFUSION INIT: CPT

## 2022-08-02 PROCEDURE — 96361 HYDRATE IV INFUSION ADD-ON: CPT

## 2022-08-02 RX ORDER — HYDROCODONE BITARTRATE AND ACETAMINOPHEN 5; 325 MG/1; MG/1
1 TABLET ORAL ONCE
Status: COMPLETED | OUTPATIENT
Start: 2022-08-02 | End: 2022-08-02

## 2022-08-03 ENCOUNTER — APPOINTMENT (OUTPATIENT)
Dept: CT IMAGING | Facility: HOSPITAL | Age: 62
End: 2022-08-03
Attending: EMERGENCY MEDICINE
Payer: COMMERCIAL

## 2022-08-03 VITALS
OXYGEN SATURATION: 96 % | RESPIRATION RATE: 20 BRPM | SYSTOLIC BLOOD PRESSURE: 131 MMHG | DIASTOLIC BLOOD PRESSURE: 64 MMHG | HEART RATE: 89 BPM

## 2022-08-03 PROCEDURE — 74177 CT ABD & PELVIS W/CONTRAST: CPT | Performed by: EMERGENCY MEDICINE

## 2022-08-03 RX ORDER — HYDROCODONE BITARTRATE AND ACETAMINOPHEN 5; 325 MG/1; MG/1
1 TABLET ORAL EVERY 4 HOURS PRN
Qty: 20 TABLET | Refills: 0 | Status: SHIPPED | OUTPATIENT
Start: 2022-08-03

## 2022-08-03 RX ORDER — IOHEXOL 350 MG/ML
100 INJECTION, SOLUTION INTRAVENOUS
Status: COMPLETED | OUTPATIENT
Start: 2022-08-03 | End: 2022-08-03

## 2022-08-03 RX ORDER — HYDROCODONE BITARTRATE AND ACETAMINOPHEN 5; 325 MG/1; MG/1
1 TABLET ORAL ONCE
Status: COMPLETED | OUTPATIENT
Start: 2022-08-03 | End: 2022-08-03

## 2022-08-03 NOTE — ED INITIAL ASSESSMENT (HPI)
PT TO THE ED WITH C/O EPIGASTRIC PAIN X1 DAY. PT HAS A HX OF PANCREATIC CANCER.  PT DENIES N/V/D AND STATES PAIN HAS SUBSIDED

## 2022-08-17 ENCOUNTER — HOSPITAL ENCOUNTER (OUTPATIENT)
Facility: HOSPITAL | Age: 62
Setting detail: OBSERVATION
Discharge: HOME | End: 2022-08-19
Attending: EMERGENCY MEDICINE | Admitting: INTERNAL MEDICINE
Payer: COMMERCIAL

## 2022-08-17 DIAGNOSIS — Z85.07 HISTORY OF PANCREATIC CANCER: Primary | ICD-10-CM

## 2022-08-17 DIAGNOSIS — R10.9 ABDOMINAL PAIN, ACUTE: ICD-10-CM

## 2022-08-17 LAB
BASOPHILS # BLD AUTO: 0.06 X10(3) UL (ref 0–0.2)
BASOPHILS NFR BLD AUTO: 0.5 %
EOSINOPHIL # BLD AUTO: 0.32 X10(3) UL (ref 0–0.7)
EOSINOPHIL NFR BLD AUTO: 2.7 %
ERYTHROCYTE [DISTWIDTH] IN BLOOD BY AUTOMATED COUNT: 14.4 %
HCT VFR BLD AUTO: 37.9 %
HGB BLD-MCNC: 12.5 G/DL
IMM GRANULOCYTES # BLD AUTO: 0.12 X10(3) UL (ref 0–1)
IMM GRANULOCYTES NFR BLD: 1 %
LYMPHOCYTES # BLD AUTO: 3.59 X10(3) UL (ref 1–4)
LYMPHOCYTES NFR BLD AUTO: 30.1 %
MCH RBC QN AUTO: 30.7 PG (ref 26–34)
MCHC RBC AUTO-ENTMCNC: 33 G/DL (ref 31–37)
MCV RBC AUTO: 93.1 FL
MONOCYTES # BLD AUTO: 1.05 X10(3) UL (ref 0.1–1)
MONOCYTES NFR BLD AUTO: 8.8 %
NEUTROPHILS # BLD AUTO: 6.78 X10 (3) UL (ref 1.5–7.7)
NEUTROPHILS # BLD AUTO: 6.78 X10(3) UL (ref 1.5–7.7)
NEUTROPHILS NFR BLD AUTO: 56.9 %
PLATELET # BLD AUTO: 393 10(3)UL (ref 150–450)
RBC # BLD AUTO: 4.07 X10(6)UL
WBC # BLD AUTO: 11.9 X10(3) UL (ref 4–11)

## 2022-08-18 PROBLEM — R73.9 HYPERGLYCEMIA: Status: ACTIVE | Noted: 2022-08-18

## 2022-08-18 PROBLEM — R79.89 AZOTEMIA: Status: ACTIVE | Noted: 2022-08-18

## 2022-08-18 PROBLEM — E87.6 HYPOKALEMIA: Status: ACTIVE | Noted: 2022-08-18

## 2022-08-18 PROBLEM — Z85.07 HISTORY OF PANCREATIC CANCER: Status: ACTIVE | Noted: 2022-08-18

## 2022-08-18 LAB
ALBUMIN SERPL-MCNC: 3.5 G/DL (ref 3.4–5)
ALBUMIN SERPL-MCNC: 3.6 G/DL (ref 3.4–5)
ALBUMIN/GLOB SERPL: 0.9 {RATIO} (ref 1–2)
ALBUMIN/GLOB SERPL: 1 {RATIO} (ref 1–2)
ALP LIVER SERPL-CCNC: 260 U/L
ALP LIVER SERPL-CCNC: 273 U/L
ALT SERPL-CCNC: 41 U/L
ALT SERPL-CCNC: 42 U/L
ANION GAP SERPL CALC-SCNC: 1 MMOL/L (ref 0–18)
ANION GAP SERPL CALC-SCNC: 4 MMOL/L (ref 0–18)
AST SERPL-CCNC: 35 U/L (ref 15–37)
AST SERPL-CCNC: 45 U/L (ref 15–37)
BASOPHILS # BLD AUTO: 0.07 X10(3) UL (ref 0–0.2)
BASOPHILS NFR BLD AUTO: 0.6 %
BILIRUB SERPL-MCNC: 0.2 MG/DL (ref 0.1–2)
BILIRUB SERPL-MCNC: 0.3 MG/DL (ref 0.1–2)
BILIRUB UR QL STRIP.AUTO: NEGATIVE
BUN BLD-MCNC: 14 MG/DL (ref 7–18)
BUN BLD-MCNC: 19 MG/DL (ref 7–18)
CALCIUM BLD-MCNC: 8.6 MG/DL (ref 8.5–10.1)
CALCIUM BLD-MCNC: 9 MG/DL (ref 8.5–10.1)
CHLORIDE SERPL-SCNC: 104 MMOL/L (ref 98–112)
CHLORIDE SERPL-SCNC: 106 MMOL/L (ref 98–112)
CLARITY UR REFRACT.AUTO: CLEAR
CO2 SERPL-SCNC: 29 MMOL/L (ref 21–32)
CO2 SERPL-SCNC: 32 MMOL/L (ref 21–32)
COLOR UR AUTO: YELLOW
CREAT BLD-MCNC: 0.65 MG/DL
CREAT BLD-MCNC: 0.77 MG/DL
EOSINOPHIL # BLD AUTO: 0.09 X10(3) UL (ref 0–0.7)
EOSINOPHIL NFR BLD AUTO: 0.8 %
ERYTHROCYTE [DISTWIDTH] IN BLOOD BY AUTOMATED COUNT: 14.6 %
GFR SERPLBLD BASED ON 1.73 SQ M-ARVRAT: 100 ML/MIN/1.73M2 (ref 60–?)
GFR SERPLBLD BASED ON 1.73 SQ M-ARVRAT: 88 ML/MIN/1.73M2 (ref 60–?)
GLOBULIN PLAS-MCNC: 3.6 G/DL (ref 2.8–4.4)
GLOBULIN PLAS-MCNC: 3.8 G/DL (ref 2.8–4.4)
GLUCOSE BLD-MCNC: 130 MG/DL (ref 70–99)
GLUCOSE BLD-MCNC: 156 MG/DL (ref 70–99)
GLUCOSE UR STRIP.AUTO-MCNC: NEGATIVE MG/DL
HCT VFR BLD AUTO: 39.6 %
HGB BLD-MCNC: 12.7 G/DL
IMM GRANULOCYTES # BLD AUTO: 0.06 X10(3) UL (ref 0–1)
IMM GRANULOCYTES NFR BLD: 0.5 %
LEUKOCYTE ESTERASE UR QL STRIP.AUTO: NEGATIVE
LIPASE SERPL-CCNC: 354 U/L (ref 73–393)
LYMPHOCYTES # BLD AUTO: 2.15 X10(3) UL (ref 1–4)
LYMPHOCYTES NFR BLD AUTO: 19.7 %
MCH RBC QN AUTO: 30.4 PG (ref 26–34)
MCHC RBC AUTO-ENTMCNC: 32.1 G/DL (ref 31–37)
MCV RBC AUTO: 94.7 FL
MONOCYTES # BLD AUTO: 0.58 X10(3) UL (ref 0.1–1)
MONOCYTES NFR BLD AUTO: 5.3 %
NEUTROPHILS # BLD AUTO: 7.96 X10 (3) UL (ref 1.5–7.7)
NEUTROPHILS # BLD AUTO: 7.96 X10(3) UL (ref 1.5–7.7)
NEUTROPHILS NFR BLD AUTO: 73.1 %
NITRITE UR QL STRIP.AUTO: NEGATIVE
OSMOLALITY SERPL CALC.SUM OF ELEC: 288 MOSM/KG (ref 275–295)
OSMOLALITY SERPL CALC.SUM OF ELEC: 292 MOSM/KG (ref 275–295)
PH UR STRIP.AUTO: 7 [PH] (ref 5–8)
PLATELET # BLD AUTO: 368 10(3)UL (ref 150–450)
POTASSIUM SERPL-SCNC: 3.2 MMOL/L (ref 3.5–5.1)
POTASSIUM SERPL-SCNC: 4 MMOL/L (ref 3.5–5.1)
PROT SERPL-MCNC: 7.2 G/DL (ref 6.4–8.2)
PROT SERPL-MCNC: 7.3 G/DL (ref 6.4–8.2)
PROT UR STRIP.AUTO-MCNC: NEGATIVE MG/DL
RBC # BLD AUTO: 4.18 X10(6)UL
RBC UR QL AUTO: NEGATIVE
SARS-COV-2 RNA RESP QL NAA+PROBE: NOT DETECTED
SODIUM SERPL-SCNC: 137 MMOL/L (ref 136–145)
SODIUM SERPL-SCNC: 139 MMOL/L (ref 136–145)
SP GR UR STRIP.AUTO: 1.02 (ref 1–1.03)
UROBILINOGEN UR STRIP.AUTO-MCNC: 0.2 MG/DL
WBC # BLD AUTO: 10.9 X10(3) UL (ref 4–11)

## 2022-08-18 PROCEDURE — 99220 INITIAL OBSERVATION CARE,LEVL III: CPT | Performed by: HOSPITALIST

## 2022-08-18 RX ORDER — HYDROCODONE BITARTRATE AND ACETAMINOPHEN 5; 325 MG/1; MG/1
1 TABLET ORAL EVERY 4 HOURS PRN
Status: DISCONTINUED | OUTPATIENT
Start: 2022-08-18 | End: 2022-08-19

## 2022-08-18 RX ORDER — GABAPENTIN 300 MG/1
300 CAPSULE ORAL NIGHTLY
Status: DISCONTINUED | OUTPATIENT
Start: 2022-08-18 | End: 2022-08-19

## 2022-08-18 RX ORDER — HYDROCODONE BITARTRATE AND ACETAMINOPHEN 5; 325 MG/1; MG/1
2 TABLET ORAL EVERY 4 HOURS PRN
Status: DISCONTINUED | OUTPATIENT
Start: 2022-08-18 | End: 2022-08-19

## 2022-08-18 RX ORDER — PANTOPRAZOLE SODIUM 40 MG/1
40 TABLET, DELAYED RELEASE ORAL
Status: DISCONTINUED | OUTPATIENT
Start: 2022-08-18 | End: 2022-08-19

## 2022-08-18 RX ORDER — DOCUSATE SODIUM 100 MG/1
100 CAPSULE, LIQUID FILLED ORAL DAILY
COMMUNITY

## 2022-08-18 RX ORDER — ACETAMINOPHEN 325 MG/1
650 TABLET ORAL EVERY 4 HOURS PRN
Status: DISCONTINUED | OUTPATIENT
Start: 2022-08-18 | End: 2022-08-19

## 2022-08-18 RX ORDER — POLYETHYLENE GLYCOL 3350 17 G/17G
17 POWDER, FOR SOLUTION ORAL DAILY
Status: DISCONTINUED | OUTPATIENT
Start: 2022-08-18 | End: 2022-08-19

## 2022-08-18 RX ORDER — ONDANSETRON 2 MG/ML
4 INJECTION INTRAMUSCULAR; INTRAVENOUS ONCE
Status: COMPLETED | OUTPATIENT
Start: 2022-08-18 | End: 2022-08-18

## 2022-08-18 RX ORDER — DICYCLOMINE HYDROCHLORIDE 10 MG/1
10 CAPSULE ORAL
COMMUNITY
Start: 2022-07-16

## 2022-08-18 RX ORDER — ONDANSETRON 2 MG/ML
4 INJECTION INTRAMUSCULAR; INTRAVENOUS EVERY 6 HOURS PRN
Status: DISCONTINUED | OUTPATIENT
Start: 2022-08-18 | End: 2022-08-19

## 2022-08-18 RX ORDER — POTASSIUM CHLORIDE 20 MEQ/1
40 TABLET, EXTENDED RELEASE ORAL ONCE
Status: COMPLETED | OUTPATIENT
Start: 2022-08-18 | End: 2022-08-18

## 2022-08-18 RX ORDER — POLYETHYLENE GLYCOL 3350 17 G/17G
17 POWDER, FOR SOLUTION ORAL DAILY PRN
Status: DISCONTINUED | OUTPATIENT
Start: 2022-08-18 | End: 2022-08-19

## 2022-08-18 RX ORDER — MELATONIN
3 NIGHTLY PRN
Status: DISCONTINUED | OUTPATIENT
Start: 2022-08-18 | End: 2022-08-19

## 2022-08-18 RX ORDER — ALPRAZOLAM 0.25 MG/1
0.25 TABLET ORAL EVERY 4 HOURS PRN
Status: DISCONTINUED | OUTPATIENT
Start: 2022-08-18 | End: 2022-08-19

## 2022-08-18 RX ORDER — HYDROMORPHONE HYDROCHLORIDE 1 MG/ML
0.4 INJECTION, SOLUTION INTRAMUSCULAR; INTRAVENOUS; SUBCUTANEOUS EVERY 2 HOUR PRN
Status: DISCONTINUED | OUTPATIENT
Start: 2022-08-18 | End: 2022-08-19

## 2022-08-18 RX ORDER — CALCIUM CARBONATE/VITAMIN D3 250-3.125
2 TABLET ORAL DAILY
Status: DISCONTINUED | OUTPATIENT
Start: 2022-08-18 | End: 2022-08-19

## 2022-08-18 RX ORDER — ESCITALOPRAM OXALATE 10 MG/1
10 TABLET ORAL DAILY
Status: DISCONTINUED | OUTPATIENT
Start: 2022-08-18 | End: 2022-08-19

## 2022-08-18 RX ORDER — SENNOSIDES 8.6 MG
17.2 TABLET ORAL NIGHTLY PRN
Status: DISCONTINUED | OUTPATIENT
Start: 2022-08-18 | End: 2022-08-19

## 2022-08-18 RX ORDER — MIRTAZAPINE 15 MG/1
15 TABLET, FILM COATED ORAL NIGHTLY
COMMUNITY
Start: 2022-06-13

## 2022-08-18 RX ORDER — SODIUM PHOSPHATE, DIBASIC AND SODIUM PHOSPHATE, MONOBASIC 7; 19 G/133ML; G/133ML
1 ENEMA RECTAL ONCE AS NEEDED
Status: DISCONTINUED | OUTPATIENT
Start: 2022-08-18 | End: 2022-08-19

## 2022-08-18 RX ORDER — ENOXAPARIN SODIUM 100 MG/ML
40 INJECTION SUBCUTANEOUS DAILY
Status: DISCONTINUED | OUTPATIENT
Start: 2022-08-18 | End: 2022-08-19

## 2022-08-18 RX ORDER — BISACODYL 10 MG
10 SUPPOSITORY, RECTAL RECTAL
Status: DISCONTINUED | OUTPATIENT
Start: 2022-08-18 | End: 2022-08-19

## 2022-08-18 RX ORDER — HYDROMORPHONE HYDROCHLORIDE 1 MG/ML
0.5 INJECTION, SOLUTION INTRAMUSCULAR; INTRAVENOUS; SUBCUTANEOUS EVERY 30 MIN PRN
Status: COMPLETED | OUTPATIENT
Start: 2022-08-18 | End: 2022-08-18

## 2022-08-18 RX ORDER — SODIUM CHLORIDE 9 MG/ML
INJECTION, SOLUTION INTRAVENOUS CONTINUOUS
Status: DISCONTINUED | OUTPATIENT
Start: 2022-08-18 | End: 2022-08-19

## 2022-08-18 RX ORDER — RIVAROXABAN 20 MG/1
20 TABLET, FILM COATED ORAL EVERY EVENING
COMMUNITY
Start: 2022-06-27

## 2022-08-18 RX ORDER — PROCHLORPERAZINE EDISYLATE 5 MG/ML
5 INJECTION INTRAMUSCULAR; INTRAVENOUS EVERY 8 HOURS PRN
Status: DISCONTINUED | OUTPATIENT
Start: 2022-08-18 | End: 2022-08-19

## 2022-08-18 RX ORDER — HYDROMORPHONE HYDROCHLORIDE 1 MG/ML
0.2 INJECTION, SOLUTION INTRAMUSCULAR; INTRAVENOUS; SUBCUTANEOUS EVERY 2 HOUR PRN
Status: DISCONTINUED | OUTPATIENT
Start: 2022-08-18 | End: 2022-08-19

## 2022-08-18 RX ORDER — DICYCLOMINE HCL 20 MG
20 TABLET ORAL 3 TIMES DAILY
Status: DISCONTINUED | OUTPATIENT
Start: 2022-08-18 | End: 2022-08-19

## 2022-08-18 RX ORDER — HYDROMORPHONE HYDROCHLORIDE 1 MG/ML
0.8 INJECTION, SOLUTION INTRAMUSCULAR; INTRAVENOUS; SUBCUTANEOUS EVERY 2 HOUR PRN
Status: DISCONTINUED | OUTPATIENT
Start: 2022-08-18 | End: 2022-08-19

## 2022-08-18 NOTE — PROGRESS NOTES
Medicated for abd pain and cramping,Appetite is poor,Ivf started d/t patient not eating and c/o weakness,Sched and prn meds given as ordered. Has rt jose cath but not accessed, Attended needs.

## 2022-08-18 NOTE — ED INITIAL ASSESSMENT (HPI)
Pt has a hx of pancreatic cancer. Pt currently receives chemo therapy. Pt reports abdominal pain and bilateral rib pain that radiates towards her pack. Pt reports having a CT scan today at [facility].  Pt took norco 45min prior to arrival. Pt has a port to right upper chest.

## 2022-08-19 VITALS
RESPIRATION RATE: 18 BRPM | OXYGEN SATURATION: 96 % | BODY MASS INDEX: 21.52 KG/M2 | TEMPERATURE: 98 F | DIASTOLIC BLOOD PRESSURE: 96 MMHG | HEIGHT: 61 IN | HEART RATE: 88 BPM | SYSTOLIC BLOOD PRESSURE: 155 MMHG | WEIGHT: 114 LBS

## 2022-08-19 PROCEDURE — 99217 OBSERVATION CARE DISCHARGE: CPT | Performed by: STUDENT IN AN ORGANIZED HEALTH CARE EDUCATION/TRAINING PROGRAM

## 2022-08-19 RX ORDER — HYDROCODONE BITARTRATE AND ACETAMINOPHEN 5; 325 MG/1; MG/1
1 TABLET ORAL EVERY 4 HOURS PRN
Qty: 12 TABLET | Refills: 0 | Status: SHIPPED | OUTPATIENT
Start: 2022-08-19

## 2022-08-19 NOTE — PROGRESS NOTES
Patient is alert and oriented x4, Cambodian speaking but able to make needs known. Denies any abdominal pain/ cramping, bentyl given as scheduled. Has poor appetite, oral hydration encouraged. Does not want port to be accessed. VSS, afebrile. Needs addressed.  at bedside.

## 2022-08-20 NOTE — PLAN OF CARE
Patient cleared from all services for DC. IV removed. Patient requesting new RX for norco, orders received and updated AVS provided to patient and spouse. Patient ambulates in room. Norco given for back pain, pt denies abd pain and states that she tolerates her diet without complication. Orthostatic vitals completed, patient denies any dizziness/lightheadedness. Patient and spouse updated and in agreement with POC and eager for DC. AVS reviewed at length w pt and spouse, all persons verbalized understanding and state that all questions have been answered. Patient progressing per POC. Patient discharged at [phone] 64 74 in wheelchair accompanied by transport to private vehicle driven by spouse. All belongings with patient. Norco RX sent to pts own pharmacy.

## 2022-08-31 ENCOUNTER — APPOINTMENT (OUTPATIENT)
Dept: CT IMAGING | Facility: HOSPITAL | Age: 62
End: 2022-08-31
Attending: EMERGENCY MEDICINE
Payer: COMMERCIAL

## 2022-08-31 ENCOUNTER — HOSPITAL ENCOUNTER (EMERGENCY)
Facility: HOSPITAL | Age: 62
Discharge: HOME | End: 2022-08-31
Attending: EMERGENCY MEDICINE
Payer: COMMERCIAL

## 2022-08-31 VITALS
WEIGHT: 114 LBS | OXYGEN SATURATION: 96 % | DIASTOLIC BLOOD PRESSURE: 92 MMHG | TEMPERATURE: 98 F | SYSTOLIC BLOOD PRESSURE: 151 MMHG | HEART RATE: 79 BPM | BODY MASS INDEX: 22 KG/M2 | RESPIRATION RATE: 21 BRPM

## 2022-08-31 DIAGNOSIS — R10.9 CHRONIC ABDOMINAL PAIN: Primary | ICD-10-CM

## 2022-08-31 DIAGNOSIS — K59.00 CONSTIPATION, UNSPECIFIED CONSTIPATION TYPE: ICD-10-CM

## 2022-08-31 DIAGNOSIS — E87.6 HYPOKALEMIA: ICD-10-CM

## 2022-08-31 DIAGNOSIS — C25.0 MALIGNANT NEOPLASM OF HEAD OF PANCREAS (HCC): ICD-10-CM

## 2022-08-31 DIAGNOSIS — G89.29 CHRONIC ABDOMINAL PAIN: Primary | ICD-10-CM

## 2022-08-31 LAB
ALBUMIN SERPL-MCNC: 3.4 G/DL (ref 3.4–5)
ALBUMIN/GLOB SERPL: 1 {RATIO} (ref 1–2)
ALP LIVER SERPL-CCNC: 191 U/L
ALT SERPL-CCNC: 28 U/L
ANION GAP SERPL CALC-SCNC: 3 MMOL/L (ref 0–18)
AST SERPL-CCNC: 22 U/L (ref 15–37)
BASOPHILS # BLD AUTO: 0.03 X10(3) UL (ref 0–0.2)
BASOPHILS NFR BLD AUTO: 0.4 %
BILIRUB SERPL-MCNC: 0.3 MG/DL (ref 0.1–2)
BILIRUB UR QL STRIP.AUTO: NEGATIVE
BUN BLD-MCNC: 14 MG/DL (ref 7–18)
CALCIUM BLD-MCNC: 8.8 MG/DL (ref 8.5–10.1)
CHLORIDE SERPL-SCNC: 103 MMOL/L (ref 98–112)
CLARITY UR REFRACT.AUTO: CLEAR
CO2 SERPL-SCNC: 32 MMOL/L (ref 21–32)
COLOR UR AUTO: YELLOW
CREAT BLD-MCNC: 0.71 MG/DL
EOSINOPHIL # BLD AUTO: 0.07 X10(3) UL (ref 0–0.7)
EOSINOPHIL NFR BLD AUTO: 0.8 %
ERYTHROCYTE [DISTWIDTH] IN BLOOD BY AUTOMATED COUNT: 13 %
GFR SERPLBLD BASED ON 1.73 SQ M-ARVRAT: 97 ML/MIN/1.73M2 (ref 60–?)
GLOBULIN PLAS-MCNC: 3.5 G/DL (ref 2.8–4.4)
GLUCOSE BLD-MCNC: 133 MG/DL (ref 70–99)
GLUCOSE UR STRIP.AUTO-MCNC: NEGATIVE MG/DL
HCT VFR BLD AUTO: 36.9 %
HGB BLD-MCNC: 12.3 G/DL
IMM GRANULOCYTES # BLD AUTO: 0.04 X10(3) UL (ref 0–1)
IMM GRANULOCYTES NFR BLD: 0.5 %
KETONES UR STRIP.AUTO-MCNC: NEGATIVE MG/DL
LEUKOCYTE ESTERASE UR QL STRIP.AUTO: NEGATIVE
LIPASE SERPL-CCNC: 164 U/L (ref 73–393)
LYMPHOCYTES # BLD AUTO: 1.92 X10(3) UL (ref 1–4)
LYMPHOCYTES NFR BLD AUTO: 22.6 %
MCH RBC QN AUTO: 30.4 PG (ref 26–34)
MCHC RBC AUTO-ENTMCNC: 33.3 G/DL (ref 31–37)
MCV RBC AUTO: 91.3 FL
MONOCYTES # BLD AUTO: 0.28 X10(3) UL (ref 0.1–1)
MONOCYTES NFR BLD AUTO: 3.3 %
NEUTROPHILS # BLD AUTO: 6.17 X10 (3) UL (ref 1.5–7.7)
NEUTROPHILS # BLD AUTO: 6.17 X10(3) UL (ref 1.5–7.7)
NEUTROPHILS NFR BLD AUTO: 72.4 %
NITRITE UR QL STRIP.AUTO: NEGATIVE
OSMOLALITY SERPL CALC.SUM OF ELEC: 288 MOSM/KG (ref 275–295)
PH UR STRIP.AUTO: 7.5 [PH] (ref 5–8)
PLATELET # BLD AUTO: 131 10(3)UL (ref 150–450)
POTASSIUM SERPL-SCNC: 3.3 MMOL/L (ref 3.5–5.1)
PROT SERPL-MCNC: 6.9 G/DL (ref 6.4–8.2)
PROT UR STRIP.AUTO-MCNC: NEGATIVE MG/DL
RBC # BLD AUTO: 4.04 X10(6)UL
SODIUM SERPL-SCNC: 138 MMOL/L (ref 136–145)
SP GR UR STRIP.AUTO: 1.01 (ref 1–1.03)
UROBILINOGEN UR STRIP.AUTO-MCNC: 0.2 MG/DL
WBC # BLD AUTO: 8.5 X10(3) UL (ref 4–11)

## 2022-08-31 PROCEDURE — 85025 COMPLETE CBC W/AUTO DIFF WBC: CPT | Performed by: EMERGENCY MEDICINE

## 2022-08-31 PROCEDURE — 99284 EMERGENCY DEPT VISIT MOD MDM: CPT

## 2022-08-31 PROCEDURE — 83690 ASSAY OF LIPASE: CPT | Performed by: EMERGENCY MEDICINE

## 2022-08-31 PROCEDURE — 74177 CT ABD & PELVIS W/CONTRAST: CPT | Performed by: EMERGENCY MEDICINE

## 2022-08-31 PROCEDURE — 36415 COLL VENOUS BLD VENIPUNCTURE: CPT

## 2022-08-31 PROCEDURE — 81003 URINALYSIS AUTO W/O SCOPE: CPT | Performed by: EMERGENCY MEDICINE

## 2022-08-31 PROCEDURE — 80053 COMPREHEN METABOLIC PANEL: CPT | Performed by: EMERGENCY MEDICINE

## 2022-08-31 RX ORDER — IOHEXOL 350 MG/ML
65 INJECTION, SOLUTION INTRAVENOUS
Status: COMPLETED | OUTPATIENT
Start: 2022-08-31 | End: 2022-08-31

## 2022-08-31 NOTE — ED INITIAL ASSESSMENT (HPI)
62YF c/c of abd pain Pt state that she having abd pain across her abd. Pt state that she has a hx of pancreatitic ca and last chemo was sat.

## 2022-09-13 PROCEDURE — 99284 EMERGENCY DEPT VISIT MOD MDM: CPT | Performed by: EMERGENCY MEDICINE

## 2022-09-13 PROCEDURE — 96374 THER/PROPH/DIAG INJ IV PUSH: CPT | Performed by: EMERGENCY MEDICINE

## 2022-09-13 PROCEDURE — 96375 TX/PRO/DX INJ NEW DRUG ADDON: CPT | Performed by: EMERGENCY MEDICINE

## 2022-09-14 ENCOUNTER — HOSPITAL ENCOUNTER (EMERGENCY)
Facility: HOSPITAL | Age: 62
Discharge: HOME | End: 2022-09-14
Attending: EMERGENCY MEDICINE
Payer: COMMERCIAL

## 2022-09-14 ENCOUNTER — APPOINTMENT (OUTPATIENT)
Dept: CT IMAGING | Facility: HOSPITAL | Age: 62
End: 2022-09-14
Attending: EMERGENCY MEDICINE
Payer: COMMERCIAL

## 2022-09-14 VITALS
WEIGHT: 114 LBS | DIASTOLIC BLOOD PRESSURE: 73 MMHG | HEART RATE: 79 BPM | RESPIRATION RATE: 16 BRPM | HEIGHT: 61 IN | BODY MASS INDEX: 21.52 KG/M2 | TEMPERATURE: 99 F | SYSTOLIC BLOOD PRESSURE: 111 MMHG | OXYGEN SATURATION: 100 %

## 2022-09-14 DIAGNOSIS — R10.31 ABDOMINAL PAIN, RIGHT LOWER QUADRANT: Primary | ICD-10-CM

## 2022-09-14 LAB
ALBUMIN SERPL-MCNC: 3.7 G/DL (ref 3.4–5)
ALBUMIN/GLOB SERPL: 0.9 {RATIO} (ref 1–2)
ALP LIVER SERPL-CCNC: 245 U/L
ALT SERPL-CCNC: 95 U/L
ANION GAP SERPL CALC-SCNC: 4 MMOL/L (ref 0–18)
AST SERPL-CCNC: 55 U/L (ref 15–37)
BASOPHILS # BLD AUTO: 0.02 X10(3) UL (ref 0–0.2)
BASOPHILS NFR BLD AUTO: 0.5 %
BILIRUB SERPL-MCNC: 0.4 MG/DL (ref 0.1–2)
BILIRUB UR QL STRIP.AUTO: NEGATIVE
BUN BLD-MCNC: 14 MG/DL (ref 7–18)
CALCIUM BLD-MCNC: 9 MG/DL (ref 8.5–10.1)
CHLORIDE SERPL-SCNC: 102 MMOL/L (ref 98–112)
CLARITY UR REFRACT.AUTO: CLEAR
CO2 SERPL-SCNC: 34 MMOL/L (ref 21–32)
COLOR UR AUTO: YELLOW
CREAT BLD-MCNC: 0.69 MG/DL
EOSINOPHIL # BLD AUTO: 0.15 X10(3) UL (ref 0–0.7)
EOSINOPHIL NFR BLD AUTO: 3.7 %
ERYTHROCYTE [DISTWIDTH] IN BLOOD BY AUTOMATED COUNT: 13.1 %
GFR SERPLBLD BASED ON 1.73 SQ M-ARVRAT: 99 ML/MIN/1.73M2 (ref 60–?)
GLOBULIN PLAS-MCNC: 4.2 G/DL (ref 2.8–4.4)
GLUCOSE BLD-MCNC: 113 MG/DL (ref 70–99)
GLUCOSE UR STRIP.AUTO-MCNC: NEGATIVE MG/DL
HCT VFR BLD AUTO: 40.4 %
HGB BLD-MCNC: 13 G/DL
IMM GRANULOCYTES # BLD AUTO: 0.02 X10(3) UL (ref 0–1)
IMM GRANULOCYTES NFR BLD: 0.5 %
KETONES UR STRIP.AUTO-MCNC: NEGATIVE MG/DL
LIPASE SERPL-CCNC: 143 U/L (ref 73–393)
LYMPHOCYTES # BLD AUTO: 2.2 X10(3) UL (ref 1–4)
LYMPHOCYTES NFR BLD AUTO: 54.6 %
MCH RBC QN AUTO: 29.7 PG (ref 26–34)
MCHC RBC AUTO-ENTMCNC: 32.2 G/DL (ref 31–37)
MCV RBC AUTO: 92.4 FL
MONOCYTES # BLD AUTO: 0.11 X10(3) UL (ref 0.1–1)
MONOCYTES NFR BLD AUTO: 2.7 %
NEUTROPHILS # BLD AUTO: 1.53 X10 (3) UL (ref 1.5–7.7)
NEUTROPHILS # BLD AUTO: 1.53 X10(3) UL (ref 1.5–7.7)
NEUTROPHILS NFR BLD AUTO: 38 %
NITRITE UR QL STRIP.AUTO: NEGATIVE
OSMOLALITY SERPL CALC.SUM OF ELEC: 291 MOSM/KG (ref 275–295)
PH UR STRIP.AUTO: 6.5 [PH] (ref 5–8)
PLATELET # BLD AUTO: 219 10(3)UL (ref 150–450)
POTASSIUM SERPL-SCNC: 3.5 MMOL/L (ref 3.5–5.1)
PROT SERPL-MCNC: 7.9 G/DL (ref 6.4–8.2)
PROT UR STRIP.AUTO-MCNC: NEGATIVE MG/DL
RBC # BLD AUTO: 4.37 X10(6)UL
SODIUM SERPL-SCNC: 140 MMOL/L (ref 136–145)
SP GR UR STRIP.AUTO: 1.02 (ref 1–1.03)
UROBILINOGEN UR STRIP.AUTO-MCNC: 0.2 MG/DL
WBC # BLD AUTO: 4 X10(3) UL (ref 4–11)

## 2022-09-14 PROCEDURE — 81015 MICROSCOPIC EXAM OF URINE: CPT | Performed by: EMERGENCY MEDICINE

## 2022-09-14 PROCEDURE — 74177 CT ABD & PELVIS W/CONTRAST: CPT | Performed by: EMERGENCY MEDICINE

## 2022-09-14 PROCEDURE — 87086 URINE CULTURE/COLONY COUNT: CPT | Performed by: EMERGENCY MEDICINE

## 2022-09-14 PROCEDURE — 85025 COMPLETE CBC W/AUTO DIFF WBC: CPT | Performed by: EMERGENCY MEDICINE

## 2022-09-14 PROCEDURE — 83690 ASSAY OF LIPASE: CPT | Performed by: EMERGENCY MEDICINE

## 2022-09-14 PROCEDURE — 81001 URINALYSIS AUTO W/SCOPE: CPT | Performed by: EMERGENCY MEDICINE

## 2022-09-14 PROCEDURE — 80053 COMPREHEN METABOLIC PANEL: CPT | Performed by: EMERGENCY MEDICINE

## 2022-09-14 RX ORDER — IOHEXOL 350 MG/ML
80 INJECTION, SOLUTION INTRAVENOUS
Status: COMPLETED | OUTPATIENT
Start: 2022-09-14 | End: 2022-09-14

## 2022-09-14 RX ORDER — HYDROMORPHONE HYDROCHLORIDE 1 MG/ML
1 INJECTION, SOLUTION INTRAMUSCULAR; INTRAVENOUS; SUBCUTANEOUS ONCE
Status: COMPLETED | OUTPATIENT
Start: 2022-09-14 | End: 2022-09-14

## 2022-09-14 RX ORDER — ONDANSETRON 2 MG/ML
4 INJECTION INTRAMUSCULAR; INTRAVENOUS ONCE
Status: COMPLETED | OUTPATIENT
Start: 2022-09-14 | End: 2022-09-14

## 2022-09-14 RX ORDER — BISACODYL 10 MG
10 SUPPOSITORY, RECTAL RECTAL DAILY
Qty: 3 SUPPOSITORY | Refills: 0 | Status: SHIPPED | OUTPATIENT
Start: 2022-09-14 | End: 2022-09-17

## 2022-09-14 NOTE — ED INITIAL ASSESSMENT (HPI)
Patient complaining of non-radiating right flank pain that began this morning. Patient complaining of nausea, but denies vomiting/diarrhea. Patient also relates she has been unable to have a bowel movement today.

## 2022-09-21 ENCOUNTER — APPOINTMENT (OUTPATIENT)
Dept: CT IMAGING | Facility: HOSPITAL | Age: 62
End: 2022-09-21
Attending: EMERGENCY MEDICINE

## 2022-09-21 ENCOUNTER — HOSPITAL ENCOUNTER (EMERGENCY)
Facility: HOSPITAL | Age: 62
Discharge: HOME | End: 2022-09-21
Attending: EMERGENCY MEDICINE

## 2022-09-21 VITALS
BODY MASS INDEX: 19.83 KG/M2 | HEIGHT: 61 IN | RESPIRATION RATE: 17 BRPM | WEIGHT: 105 LBS | OXYGEN SATURATION: 100 % | TEMPERATURE: 97 F | DIASTOLIC BLOOD PRESSURE: 75 MMHG | HEART RATE: 87 BPM | SYSTOLIC BLOOD PRESSURE: 110 MMHG

## 2022-09-21 DIAGNOSIS — E87.6 HYPOKALEMIA: ICD-10-CM

## 2022-09-21 DIAGNOSIS — R53.83 OTHER FATIGUE: Primary | ICD-10-CM

## 2022-09-21 LAB
ALBUMIN SERPL-MCNC: 3.4 G/DL (ref 3.4–5)
ALBUMIN/GLOB SERPL: 0.9 {RATIO} (ref 1–2)
ALP LIVER SERPL-CCNC: 239 U/L
ALT SERPL-CCNC: 62 U/L
ANION GAP SERPL CALC-SCNC: 9 MMOL/L (ref 0–18)
AST SERPL-CCNC: 56 U/L (ref 15–37)
BASOPHILS # BLD AUTO: 0.02 X10(3) UL (ref 0–0.2)
BASOPHILS NFR BLD AUTO: 0.3 %
BILIRUB SERPL-MCNC: 0.6 MG/DL (ref 0.1–2)
BUN BLD-MCNC: 14 MG/DL (ref 7–18)
CALCIUM BLD-MCNC: 9.5 MG/DL (ref 8.5–10.1)
CHLORIDE SERPL-SCNC: 102 MMOL/L (ref 98–112)
CO2 SERPL-SCNC: 27 MMOL/L (ref 21–32)
CREAT BLD-MCNC: 1 MG/DL
EOSINOPHIL # BLD AUTO: 0.11 X10(3) UL (ref 0–0.7)
EOSINOPHIL NFR BLD AUTO: 1.7 %
ERYTHROCYTE [DISTWIDTH] IN BLOOD BY AUTOMATED COUNT: 13.3 %
GFR SERPLBLD BASED ON 1.73 SQ M-ARVRAT: 64 ML/MIN/1.73M2 (ref 60–?)
GLOBULIN PLAS-MCNC: 3.7 G/DL (ref 2.8–4.4)
GLUCOSE BLD-MCNC: 133 MG/DL (ref 70–99)
HCT VFR BLD AUTO: 36.7 %
HGB BLD-MCNC: 12.3 G/DL
IMM GRANULOCYTES # BLD AUTO: 0.01 X10(3) UL (ref 0–1)
IMM GRANULOCYTES NFR BLD: 0.2 %
LYMPHOCYTES # BLD AUTO: 4.23 X10(3) UL (ref 1–4)
LYMPHOCYTES NFR BLD AUTO: 65.4 %
MCH RBC QN AUTO: 29.8 PG (ref 26–34)
MCHC RBC AUTO-ENTMCNC: 33.5 G/DL (ref 31–37)
MCV RBC AUTO: 88.9 FL
MONOCYTES # BLD AUTO: 0.41 X10(3) UL (ref 0.1–1)
MONOCYTES NFR BLD AUTO: 6.3 %
NEUTROPHILS # BLD AUTO: 1.69 X10 (3) UL (ref 1.5–7.7)
NEUTROPHILS # BLD AUTO: 1.69 X10(3) UL (ref 1.5–7.7)
NEUTROPHILS NFR BLD AUTO: 26.1 %
OSMOLALITY SERPL CALC.SUM OF ELEC: 288 MOSM/KG (ref 275–295)
PLATELET # BLD AUTO: 85 10(3)UL (ref 150–450)
POTASSIUM SERPL-SCNC: 3 MMOL/L (ref 3.5–5.1)
PROT SERPL-MCNC: 7.1 G/DL (ref 6.4–8.2)
RBC # BLD AUTO: 4.13 X10(6)UL
SARS-COV-2 RNA RESP QL NAA+PROBE: NOT DETECTED
SODIUM SERPL-SCNC: 138 MMOL/L (ref 136–145)
WBC # BLD AUTO: 6.5 X10(3) UL (ref 4–11)

## 2022-09-21 PROCEDURE — 70450 CT HEAD/BRAIN W/O DYE: CPT | Performed by: EMERGENCY MEDICINE

## 2022-09-21 PROCEDURE — 99284 EMERGENCY DEPT VISIT MOD MDM: CPT

## 2022-09-21 PROCEDURE — 36415 COLL VENOUS BLD VENIPUNCTURE: CPT

## 2022-09-21 PROCEDURE — 80053 COMPREHEN METABOLIC PANEL: CPT | Performed by: EMERGENCY MEDICINE

## 2022-09-21 PROCEDURE — 85025 COMPLETE CBC W/AUTO DIFF WBC: CPT | Performed by: EMERGENCY MEDICINE

## 2022-09-21 PROCEDURE — 93005 ELECTROCARDIOGRAM TRACING: CPT

## 2022-09-21 PROCEDURE — 93010 ELECTROCARDIOGRAM REPORT: CPT

## 2022-09-21 NOTE — ED INITIAL ASSESSMENT (HPI)
Pt being treated for pancreatic ca. Pt received 2nd chemo 2 weeks ago. Since then pt with weakness and decline. Denies vomiting or diarrhea.

## 2022-09-22 LAB
ATRIAL RATE: 111 BPM
P AXIS: -6 DEGREES
P-R INTERVAL: 148 MS
Q-T INTERVAL: 334 MS
QRS DURATION: 76 MS
QTC CALCULATION (BEZET): 454 MS
R AXIS: 4 DEGREES
T AXIS: 12 DEGREES
VENTRICULAR RATE: 111 BPM

## (undated) DEVICE — COVER,MAYO STAND,STERILE: Brand: MEDLINE

## (undated) DEVICE — 3M™ RED DOT™ MONITORING ELECTRODE WITH FOAM TAPE AND STICKY GEL, 50/BAG, 20/CASE, 72/PLT 2570: Brand: RED DOT™

## (undated) DEVICE — ENDOSCOPY PACK - LOWER: Brand: MEDLINE INDUSTRIES, INC.

## (undated) DEVICE — SUTURE VICRYL 0 CT-1

## (undated) DEVICE — EXOFIN TISSUE ADHESIVE 1.0ML

## (undated) DEVICE — FILTERLINE NASAL ADULT O2/CO2

## (undated) DEVICE — FORCEP BIOPSY RJ4 LG CAP W/ND

## (undated) DEVICE — ENDOSCOPIC ULTRASOUND FINE NEEDLE BIOPSY (FNB) DEVICE: Brand: ACQUIRE

## (undated) DEVICE — MEDI-VAC NON-CONDUCTIVE SUCTION TUBING: Brand: CARDINAL HEALTH

## (undated) DEVICE — STERILE POLYISOPRENE POWDER-FREE SURGICAL GLOVES: Brand: PROTEXIS

## (undated) DEVICE — Device: Brand: DEFENDO AIR/WATER/SUCTION AND BIOPSY VALVE

## (undated) DEVICE — 1200CC GUARDIAN II: Brand: GUARDIAN

## (undated) DEVICE — SOL  .9 1000ML BTL

## (undated) DEVICE — SYRINGE 10ML LL TIP

## (undated) DEVICE — GYN CDS: Brand: MEDLINE INDUSTRIES, INC.

## (undated) DEVICE — VIOLET BRAIDED (POLYGLACTIN 910), SYNTHETIC ABSORBABLE SUTURE: Brand: COATED VICRYL

## (undated) DEVICE — DISPOSABLE DISTAL ATTACHMENT: Brand: DISPOSABLE DISTAL ATTACHMENT

## (undated) DEVICE — Device

## (undated) DEVICE — BOWLS UTILITY 16OZ

## (undated) DEVICE — ENDOSCOPY PACK UPPER: Brand: MEDLINE INDUSTRIES, INC.

## (undated) DEVICE — #10 STERILE BLADE: Brand: POLYMER COATED BLADES

## (undated) DEVICE — STANDARD HYPODERMIC NEEDLE,POLYPROPYLENE HUB: Brand: MONOJECT

## (undated) DEVICE — BAG DRAIN INFECTION CNTRL 2000

## (undated) DEVICE — SPHINCTEROTOME: Brand: HYDRATOME RX 44

## (undated) DEVICE — #15 STERILE STAINLESS BLADE: Brand: STERILE STAINLESS BLADES

## (undated) DEVICE — RETRIEVAL BALLOON CATHETER: Brand: EXTRACTOR™ PRO RX

## (undated) DEVICE — LAPAROTOMY SPONGE - RF AND X-RAY DETECTABLE PRE-WASHED: Brand: SITUATE

## (undated) DEVICE — MEDI-VAC SUCTION HANDLE REGULAR CAPACITY: Brand: CARDINAL HEALTH

## (undated) DEVICE — SCD SLEEVE KNEE HI BLEND

## (undated) DEVICE — RETRACTOR LONE STAR STAYS LG

## (undated) DEVICE — REM POLYHESIVE ADULT PATIENT RETURN ELECTRODE: Brand: VALLEYLAB

## (undated) DEVICE — TUBING CYSTO

## (undated) DEVICE — CAUTERY PENCIL

## (undated) DEVICE — LOCKING DEVICE RX & BIOPSY CAP

## (undated) DEVICE — HYDRA JAGWIRE

## (undated) DEVICE — SYRINGE 10ML LL CONTRL SYRINGE

## (undated) DEVICE — SYRINGE 50ML LL TIP

## (undated) DEVICE — OMNIPAQUE 300 POLYB 50ML

## (undated) DEVICE — SUTURE VICRYL 2-0 CT-1

## (undated) DEVICE — STERILE POLYISOPRENE POWDER-FREE SURGICAL GLOVES WITH EMOLLIENT COATING: Brand: PROTEXIS

## (undated) NOTE — ED AVS SNAPSHOT
Yvrose Gutiérrez   MRN: [id number]    Department:  [facility] Emergency Department   Date of Visit:  11/26/2019           Disclosure     Insurance plans vary and the physician(s) referred by the ER may not be covered by your plan.  Please contact your i tell this physician (or your personal doctor if your instructions are to return to your personal doctor) about any new or lasting problems. The primary care or specialist physician will see patients referred from the [facility] Emergency Department.  Shelton Lombard

## (undated) NOTE — Clinical Note
I saw Yina Campbell with various pelvic floor complaints, mostly centered around urinary function. I'm going to start with some additional testing and work to manage her sx pending results of those tests. I appreciate the opportunity to participate in her care.  Mallory Delgadillo

## (undated) NOTE — ED AVS SNAPSHOT
Eden Burnett   MRN: [id number]    Department:  [facility] Emergency Department   Date of Visit:  10/29/2018           Disclosure     Insurance plans vary and the physician(s) referred by the ER may not be covered by your plan.  Please contact your i tell this physician (or your personal doctor if your instructions are to return to your personal doctor) about any new or lasting problems. The primary care or specialist physician will see patients referred from the [facility] Emergency Department.  Carli Smith

## (undated) NOTE — ED AVS SNAPSHOT
[facility] Emergency Department    [place]  [address] [place] [address]    Phone:  [phone]    Fax:  [phone]           Merlene Orellana   MRN: [id number]    Department:  [facility] Emergency Department   Date of Visit:  2/11/201 Follow the directions for taking your medications provided by your doctor. Please ask your health care provider, pharmacist or nurse if you have any questions regarding your home medications, including potential side effects.               Medication List a detailed feedback survey mailed to them a week after the visit. If you receive this, we would really appreciate it if you could take the time to complete it. Thank you! You were examined and treated today on an urgent basis only.   This was not a alcala [address]  [facility] ([address]) [facility] [address] ([address]) [address] (Ursula Reagan) [address]

## (undated) NOTE — LETTER
[address]  [address] [place] [address]  [address]  [address] [place] [address]: [phone]  FAX: [phone]   Consent to Procedure/Sedation    Date: __12/4/2018_____    Time: ___11:04 AM ___    1.  I authorize the performanc ___________________________    Signature of person authorized to consent for patient: Relationship to patient:  ___________________________    ___________________    Witness: ____________________     Date: ______________    Printed: 12/4/2018   11:04 AM

## (undated) NOTE — ED AVS SNAPSHOT
Goyo Krishna   MRN: [id number]    Department:  [facility] Emergency Department   Date of Visit:  2/20/2020           Disclosure     Insurance plans vary and the physician(s) referred by the ER may not be covered by your plan.  Please contact your in tell this physician (or your personal doctor if your instructions are to return to your personal doctor) about any new or lasting problems. The primary care or specialist physician will see patients referred from the [facility] Emergency Department.  Salvadore Skiff

## (undated) NOTE — ED AVS SNAPSHOT
Marcia Stoddard   MRN: [id number]    Department:  [facility] Emergency Department   Date of Visit:  4/11/2018           Disclosure     Insurance plans vary and the physician(s) referred by the ER may not be covered by your plan.  Please contact your in tell this physician (or your personal doctor if your instructions are to return to your personal doctor) about any new or lasting problems. The primary care or specialist physician will see patients referred from the [facility] Emergency Department.  Anish Astudillo

## (undated) NOTE — LETTER
[address]  [address] [place] [address]  [address] [place] [address]: [phone]  FAX: [phone]   Consent to Procedure/Sedation    Date: __2/12/2019_____    Time: ___2:08 PM ___    1.  I authorize the performance ___________________________    Signature of person authorized to consent for patient: Relationship to patient:  ___________________________    ___________________    Witness: ____________________     Date: ______________    Printed: 2/12/2019   2:08 PM

## (undated) NOTE — LETTER
Date: 2020  Patient Name:  Goyo Tomi             Address: [address] [id number]    : 1960    Dear Goyo Krishna,      I hope this letter finds you doing well. I am writing to inform you of the following:         The [employer]i

## (undated) NOTE — ED AVS SNAPSHOT
[facility] Emergency Department    [place]  [address] [place] [address]    Phone:  [phone]    Fax:  [phone]           Kaila Vallejo   MRN: [id number]    Department:  [facility] Emergency Department   Date of Visit:  1/28/201 IF THERE IS ANY CHANGE OR WORSENING OF YOUR CONDITION, CALL YOUR PRIMARY CARE PHYSICIAN AT ONCE OR RETURN IMMEDIATELY TO THE EMERGENCY DEPARTMENT.     If you have been prescribed any medication(s), please fill your prescription right away and begin taking t

## (undated) NOTE — ED AVS SNAPSHOT
Yaritza Ness   MRN: [id number]    Department:  [facility] Emergency Department   Date of Visit:  8/12/2019           Disclosure     Insurance plans vary and the physician(s) referred by the ER may not be covered by your plan.  Please contact your in tell this physician (or your personal doctor if your instructions are to return to your personal doctor) about any new or lasting problems. The primary care or specialist physician will see patients referred from the [facility] Emergency Department.  Desmond Norton

## (undated) NOTE — ED AVS SNAPSHOT
[facility] Emergency Department    [place]  [address] [address]    Phone:  [phone]    Fax:  [phone]           Michelle Renetta   MRN: [id number]    Department:  [facility] Emergency Department   Date of Visit:  6/12/201 Si usted tiene algun problema con alcala sequimiento, por favor llame a nuestro adminstrador de roman al [phone]    Expect to receive an electronic request (by e-mail or text) to complete a self-assessment the day after your visit.   You may also receiv Terrell Rascon [facility] [address] ([address] [facility]) [address] [place]. ([address] [facility]) [address]  ([address]) [address] [facility] INDICATIONS:  head pain or injury numbness and tingling. Diminished sensation left face. Stiffness     TECHNIQUE:  Noncontrast CT scanning is performed through the brain. Dose reduction techniques were used.  Dose information is transmitted to the ACR Chas Gore Approved by: MD Robyn Shelley     Call the [employer] for assistance with your inactive [employer] account    If you have questions, you can call [phone] to talk to our [facility] Staff.  Remember, Robyn is NOT to be

## (undated) NOTE — ED AVS SNAPSHOT
[facility] Emergency Department    [place]  [address] [place] [address]    Phone:  [phone]    Fax:  [phone]           Colton Duquer   MRN: [id number]    Department:  [facility] Emergency Department   Date of Visit:  2/11/201 IF THERE IS ANY CHANGE OR WORSENING OF YOUR CONDITION, CALL YOUR PRIMARY CARE PHYSICIAN AT ONCE OR RETURN IMMEDIATELY TO THE EMERGENCY DEPARTMENT.     If you have been prescribed any medication(s), please fill your prescription right away and begin taking t

## (undated) NOTE — ED AVS SNAPSHOT
Rosibel Smith   MRN: [id number]    Department:  [facility] Emergency Department   Date of Visit:  2/11/2020           Disclosure     Insurance plans vary and the physician(s) referred by the ER may not be covered by your plan.  Please contact your in tell this physician (or your personal doctor if your instructions are to return to your personal doctor) about any new or lasting problems. The primary care or specialist physician will see patients referred from the [facility] Emergency Department.  Clive Carlin

## (undated) NOTE — Clinical Note
Date: 8/27/2017  Patient: Yvrose Gutiérrez  Admitted: 8/27/2017  2:36 PM  Attending Provider: Jaiden Treviño MD    Yvrose Gutiérrez or her authorized caregiver has made the decision for the patient to leave the emergency department against the advice of DR TIFFANIE HAIRSTON

## (undated) NOTE — Clinical Note
Roberta López saw Angie Hinojosa today with worsening bulge sx. I've recommended PT vs pessary vs surgery. I will work to manage her sx. She's considering options. I appreciate the opportunity to participate in her care.  Thanks, [employer]

## (undated) NOTE — LETTER
Date & Time: 8/27/2017, 4:06 PM  Patient: Kirk Drivers  Attending Provider: Svetlana Rojas MD      This certifies that Deandra York, a patient at an [address] [place] [address], am leaving the facility voluntarily and against the advice o

## (undated) NOTE — ED AVS SNAPSHOT
Rosibel Smith   MRN: [id number]    Department:  [facility] Emergency Department   Date of Visit:  2/5/2019           Disclosure     Insurance plans vary and the physician(s) referred by the ER may not be covered by your plan.  Please contact your ins tell this physician (or your personal doctor if your instructions are to return to your personal doctor) about any new or lasting problems. The primary care or specialist physician will see patients referred from the [facility] Emergency Department.  Patti Booth

## (undated) NOTE — LETTER
Patient Name: Esteban Alvarez  YOB: 1960          MRN number:  [id number]  Date:  5/2/2019  Referring Physician:  Eli Rajan     Discharge Summary    Pt has attended 7 visits in Physical Therapy.      Dx: Suprapubic pressure, pelvic muscle wa External Palpation: no tenderness lumbar spine/paraspinals today  Abdominal Wall Integrity: NO tenderness suprapubic OR right region, L lower ribcage hypersensitive to light touch, minimal pain left lower abdominal quadrant that resolves with manual therap Plan: Pt considered discharged from physical therapy.   Pt instructed to call clinic if he/she has any further questions and to continue home exercise program.      Patient/Family/Caregiver was advised of these findings, precautions, and treatment options a

## (undated) NOTE — LETTER
OUTSIDE TESTING RESULT REQUEST     IMPORTANT: FOR YOUR IMMEDIATE ATTENTION  Please FAX all test results listed below to: [phone]     Testing already done on or about: 2022 or early 3/2022     * * * * If testing is NOT complete, arrange with patient A.S.A.P. * * * *      Patient Name: Walter Canela  Surgery Date: 3/14/2022  CSN: [id number]  Medical Record: [id number]   : 1960 - A: 62 y      Sex: female  Surgeon(s):  Elsa Scott DO  Procedure: TOTAL VAGINAL HYSTERECTOMY, POSSIBLE BILATERAL SALPINGO-OOPHORECTOMY, UTEROSACRAL VAGINAL VAULT SUSPENSION, ANTERIOR POSTERIOR ENTEROCELE REPAIR, POSSIBLE PLACEMENT OF MID-URETHRAL SLING, CYSTOSCOPY  Anesthesia Type: Choice     Surgeon: Elsa Scott DO     The following Testing and Time Line are REQUIRED PER ANESTHESIA     EKG READ AND SIGNED WITHIN   90 days      Thank You,   Sent by:Gracie Valero  5/13/15

## (undated) NOTE — ED AVS SNAPSHOT
Steven Barrera   MRN: [id number]    Department:  [facility] Emergency Department   Date of Visit:  12/17/2018           Disclosure     Insurance plans vary and the physician(s) referred by the ER may not be covered by your plan.  Please contact your i tell this physician (or your personal doctor if your instructions are to return to your personal doctor) about any new or lasting problems. The primary care or specialist physician will see patients referred from the [facility] Emergency Department.  Shelton Lombard

## (undated) NOTE — ED AVS SNAPSHOT
Oly Page   MRN: [id number]    Department:  [facility] Emergency Department   Date of Visit:  8/27/2017           Disclosure     Insurance plans vary and the physician(s) referred by the ER may not be covered by your plan.  Please contact your in If you have been prescribed any medication(s), please fill your prescription right away and begin taking the medication(s) as directed    If the emergency physician has read X-rays, these will be re-interpreted by a radiologist.  If there is a significant

## (undated) NOTE — ED AVS SNAPSHOT
[facility] Emergency Department    [place]  [address] [address]    Phone:  [phone]    Fax:  [phone]           Val Shanta   MRN: [id number]    Department:  [facility] Emergency Department   Date of Visit:  1/28/201 covered by your plan. Please contact your insurance company to determine coverage for follow-up care and referrals.     [address] [place] [phone]  Pediatric [phone] Emergency Department   [phone]       To by a radiologist.  If there is a significant change in your reading, you will be contacted. Please make sure we have your correct phone number before you leave. After you leave, you should follow the attached instructions.      I have read and understand th [address]. Imaging Results         CTA BRAIN + CTA CAROTIDS (XFC=[id number]) (Final result) Result time:  01/28/17 18:39:38    Final result    Impression:    CONCLUSION:    1. No acute intracranial process.  If there is clinical concern There is no acute intracranial hemorrhage or extra-axial fluid collection. There is no focal parenchymal attenuation abnormality. There is no evident fracture. The visualized paranasal sinuses and mastoid air cells are unremarkable.       The upper cer PROCEDURE:  XR CHEST AP PORTABLE (CPT=71010)     TECHNIQUE:  AP chest radiograph was obtained. COMPARISON:  [facility] , XR CHEST PA + LAT CHEST (CPT=71020), 9/17/2016, 11:11.      INDICATIONS:  back pain/numbness to face     PATIENT STATED HISTORY:  Numbn

## (undated) NOTE — ED AVS SNAPSHOT
[facility] Emergency Department    [place]  [address] [address]    Phone:  [phone]    Fax:  [phone]           Jesus Manuel Lino   MRN: [id number]    Department:  [facility] Emergency Department   Date of Visit:  6/12/201 IF THERE IS ANY CHANGE OR WORSENING OF YOUR CONDITION, CALL YOUR PRIMARY CARE PHYSICIAN AT ONCE OR RETURN IMMEDIATELY TO THE EMERGENCY DEPARTMENT.     If you have been prescribed any medication(s), please fill your prescription right away and begin taking t

## (undated) NOTE — ED AVS SNAPSHOT
Shannan Lorenzo   MRN: [id number]    Department:  [facility] Emergency Department   Date of Visit:  2/10/2019           Disclosure     Insurance plans vary and the physician(s) referred by the ER may not be covered by your plan.  Please contact your in tell this physician (or your personal doctor if your instructions are to return to your personal doctor) about any new or lasting problems. The primary care or specialist physician will see patients referred from the [facility] Emergency Department.  Salvadore Skiff